# Patient Record
Sex: FEMALE | Race: BLACK OR AFRICAN AMERICAN | NOT HISPANIC OR LATINO | Employment: OTHER | URBAN - METROPOLITAN AREA
[De-identification: names, ages, dates, MRNs, and addresses within clinical notes are randomized per-mention and may not be internally consistent; named-entity substitution may affect disease eponyms.]

---

## 2018-09-06 ENCOUNTER — HOSPITAL ENCOUNTER (OUTPATIENT)
Dept: RADIOLOGY | Facility: HOSPITAL | Age: 78
Discharge: HOME | End: 2018-09-06
Attending: ORTHOPAEDIC SURGERY
Payer: MEDICARE

## 2018-09-06 ENCOUNTER — CLINICAL SUPPORT (OUTPATIENT)
Dept: LAB | Facility: HOSPITAL | Age: 78
End: 2018-09-06
Attending: ORTHOPAEDIC SURGERY
Payer: MEDICARE

## 2018-09-06 DIAGNOSIS — Z01.818 PREOP EXAMINATION: Primary | ICD-10-CM

## 2018-09-06 DIAGNOSIS — Z01.818 PREOP EXAMINATION: ICD-10-CM

## 2018-09-06 PROCEDURE — 93010 ELECTROCARDIOGRAM REPORT: CPT | Mod: ,,, | Performed by: INTERNAL MEDICINE

## 2018-09-06 PROCEDURE — 93005 ELECTROCARDIOGRAM TRACING: CPT

## 2018-09-06 PROCEDURE — 71046 X-RAY EXAM CHEST 2 VIEWS: CPT | Mod: 26,,, | Performed by: RADIOLOGY

## 2018-09-06 PROCEDURE — 93010 EKG 12-LEAD: ICD-10-PCS | Mod: ,,, | Performed by: INTERNAL MEDICINE

## 2018-09-06 PROCEDURE — 71046 X-RAY EXAM CHEST 2 VIEWS: CPT | Mod: TC,FY

## 2018-09-13 DIAGNOSIS — M17.12 PRIMARY OSTEOARTHRITIS OF LEFT KNEE: Primary | ICD-10-CM

## 2018-09-27 ENCOUNTER — HOSPITAL ENCOUNTER (OUTPATIENT)
Facility: HOSPITAL | Age: 78
Discharge: HOME | End: 2018-09-27
Attending: PAIN MEDICINE | Admitting: PAIN MEDICINE
Payer: MEDICARE

## 2018-09-27 ENCOUNTER — OFFICE VISIT (OUTPATIENT)
Dept: FAMILY MEDICINE | Facility: HOSPITAL | Age: 78
End: 2018-09-27
Payer: MEDICARE

## 2018-09-27 ENCOUNTER — ANESTHESIA EVENT (OUTPATIENT)
Dept: SURGERY | Facility: HOSPITAL | Age: 78
DRG: 470 | End: 2018-09-27
Payer: MEDICARE

## 2018-09-27 VITALS
TEMPERATURE: 99 F | HEIGHT: 62 IN | RESPIRATION RATE: 17 BRPM | SYSTOLIC BLOOD PRESSURE: 138 MMHG | HEART RATE: 88 BPM | OXYGEN SATURATION: 99 % | WEIGHT: 143 LBS | DIASTOLIC BLOOD PRESSURE: 74 MMHG | BODY MASS INDEX: 26.31 KG/M2

## 2018-09-27 VITALS
BODY MASS INDEX: 26.33 KG/M2 | HEIGHT: 62 IN | DIASTOLIC BLOOD PRESSURE: 80 MMHG | WEIGHT: 143.06 LBS | SYSTOLIC BLOOD PRESSURE: 158 MMHG | HEART RATE: 67 BPM

## 2018-09-27 DIAGNOSIS — Z01.818 PRE-OP EXAMINATION: ICD-10-CM

## 2018-09-27 DIAGNOSIS — M17.12 PRIMARY OSTEOARTHRITIS OF LEFT KNEE: Primary | ICD-10-CM

## 2018-09-27 DIAGNOSIS — M17.12 PRIMARY OSTEOARTHRITIS OF LEFT KNEE: ICD-10-CM

## 2018-09-27 DIAGNOSIS — I10 ESSENTIAL HYPERTENSION: ICD-10-CM

## 2018-09-27 DIAGNOSIS — Z01.818 PRE-OP TESTING: Primary | ICD-10-CM

## 2018-09-27 PROCEDURE — 99214 OFFICE O/P EST MOD 30 MIN: CPT | Mod: 25

## 2018-09-27 PROCEDURE — 25000003 PHARM REV CODE 250: Performed by: NURSE PRACTITIONER

## 2018-09-27 PROCEDURE — 64640 INJECTION TREATMENT OF NERVE: CPT | Performed by: NURSE PRACTITIONER

## 2018-09-27 PROCEDURE — 27201689 HC IOVERA SMART TIP/CARTRIDGE: Performed by: NURSE PRACTITIONER

## 2018-09-27 PROCEDURE — 64640 INJECTION TREATMENT OF NERVE: CPT | Mod: LT,,, | Performed by: NURSE PRACTITIONER

## 2018-09-27 RX ORDER — ACETAMINOPHEN 500 MG
1000 TABLET ORAL
Status: CANCELLED | OUTPATIENT
Start: 2018-09-27 | End: 2018-09-28

## 2018-09-27 RX ORDER — CELECOXIB 100 MG/1
400 CAPSULE ORAL
Status: CANCELLED | OUTPATIENT
Start: 2018-09-27 | End: 2018-09-27

## 2018-09-27 RX ORDER — ERGOCALCIFEROL 1.25 MG/1
50000 CAPSULE ORAL
COMMUNITY

## 2018-09-27 RX ORDER — TRANEXAMIC ACID 650 MG/1
1950 TABLET ORAL ONCE
Status: CANCELLED | OUTPATIENT
Start: 2018-09-27

## 2018-09-27 RX ORDER — DEXTROMETHORPHAN HYDROBROMIDE, GUAIFENESIN 5; 100 MG/5ML; MG/5ML
650 LIQUID ORAL EVERY 8 HOURS
COMMUNITY

## 2018-09-27 RX ORDER — CEFAZOLIN SODIUM 2 G/50ML
2 SOLUTION INTRAVENOUS
Status: CANCELLED | OUTPATIENT
Start: 2018-09-27

## 2018-09-27 RX ORDER — DOXYCYCLINE HYCLATE 100 MG
TABLET ORAL
COMMUNITY
Start: 2018-08-22 | End: 2018-09-27

## 2018-09-27 RX ORDER — PREGABALIN 75 MG/1
150 CAPSULE ORAL
Status: CANCELLED | OUTPATIENT
Start: 2018-09-27 | End: 2018-09-27

## 2018-09-27 RX ORDER — AMLODIPINE BESYLATE 10 MG/1
10 TABLET ORAL DAILY
COMMUNITY

## 2018-09-27 RX ORDER — ERYTHROMYCIN 5 MG/G
OINTMENT OPHTHALMIC
COMMUNITY
Start: 2018-08-22 | End: 2018-09-27

## 2018-09-27 RX ORDER — LIDOCAINE HYDROCHLORIDE 10 MG/ML
10 INJECTION INFILTRATION; PERINEURAL ONCE
Status: COMPLETED | OUTPATIENT
Start: 2018-09-27 | End: 2018-09-27

## 2018-09-27 RX ORDER — TIMOLOL MALEATE 5 MG/ML
1 SOLUTION/ DROPS OPHTHALMIC NIGHTLY
COMMUNITY

## 2018-09-27 RX ADMIN — LIDOCAINE HYDROCHLORIDE 10 ML: 10 INJECTION, SOLUTION INFILTRATION; PERINEURAL at 11:09

## 2018-09-27 NOTE — DISCHARGE SUMMARY
[facility]  Discharge Note  Short Stay    Admit Date: 9/27/2018    Discharge Date and Time: No discharge date for patient encounter.     Attending Physician: Jonathan Laureano Jr., MD     Discharge Provider: JESE Gorman  Interventional Pain Management    Diagnoses:  Active Hospital Problems    Diagnosis  POA    Primary osteoarthritis of left knee [M17.12]  Yes      Resolved Hospital Problems   No resolved problems to display.       Discharged Condition: good    Hospital Course: Patient was admitted for an outpatient procedure and tolerated the procedure well with no complications.    Final Diagnoses: Same as principal problem.    Disposition: Home or Self Care    Follow up/Patient Instructions:    Medications:  Reconciled Home Medications:      Medication List      ASK your doctor about these medications    acetaminophen 650 MG Tbsr  Commonly known as:  TYLENOL  Take 650 mg by mouth every 8 (eight) hours.     amLODIPine 10 MG tablet  Commonly known as:  NORVASC  Take 10 mg by mouth once daily.     aspirin 81 MG Chew  Take 81 mg by mouth once daily.     ATIVAN 1 MG tablet  Generic drug:  LORazepam  1 mg as needed.     atorvastatin 10 MG tablet  Commonly known as:  LIPITOR  10 mg once daily.     cloNIDine 0.2 mg/24 hr td ptwk 0.2 mg/24 hr  Commonly known as:  CATAPRES  Place onto the skin once a week.     COZAAR 100 MG tablet  Generic drug:  losartan  100 mg 2 (two) times daily.     omeprazole 20 MG capsule  Commonly known as:  PRILOSEC  20 mg once daily.     timolol maleate 0.5% 0.5 % Drop  Commonly known as:  TIMOPTIC  1 drop every evening.     VITAMIN D2 50,000 unit Cap  Generic drug:  ergocalciferol  Take 50,000 Units by mouth every 7 days.              Discharge Procedure Orders (must include Diet, Follow-up, Activity):  Discharge Diagnosis: Same as Pre and Post Procedure  Condition on Discharge: Stable.  Diet on Discharge: Same as before.  Activity: as per instruction sheet.  Discharge to: Home  with a responsible adult.  Follow up: as per Discharge instructions

## 2018-09-27 NOTE — ANESTHESIA PREPROCEDURE EVALUATION
09/27/2018  Aga Rodriges is a 78 y.o., female here for total knee arthroplasty    Past Medical History:   Diagnosis Date    Acid reflux     Hypertension      Past Surgical History:   Procedure Laterality Date    CARPAL TUNNEL RELEASE Left     HYSTERECTOMY       Review of patient's allergies indicates:  No Known Allergies      Anesthesia Evaluation    I have reviewed the Patient Summary Reports.     I have reviewed the Medications.     Review of Systems  Anesthesia Hx:  No problems with previous Anesthesia    Social:  Social Alcohol Use, Non-Smoker    Hematology/Oncology:     Oncology Normal    -- Anemia:   EENT/Dental:EENT/Dental Normal   Cardiovascular:   Exercise tolerance: good Hypertension Denies MI.   Denies CABG/stent.  hyperlipidemia ECG has been reviewed.    Pulmonary:  Pulmonary Normal    Renal/:  Renal/ Normal     Hepatic/GI:   GERD, well controlled    Musculoskeletal:   Arthritis     OB/GYN/PEDS:  S/p hysterectomy   Neurological:  Neurology Normal    Endocrine:  Endocrine Normal        Physical Exam  General:  Well nourished    Airway/Jaw/Neck:  Airway Findings: Mouth Opening: Normal Tongue: Normal  General Airway Assessment: Adult  Mallampati: III  Improves to II with phonation.  TM Distance: Normal, at least 6 cm  Jaw/Neck Findings:  Neck ROM: Extension Decreased, Mild     Eyes/Ears/Nose:  EYES/EARS/NOSE FINDINGS: Normal   Dental:  Dental Findings: upper partial dentures    Chest/Lungs:  Chest/Lungs Clear    Heart/Vascular:  Heart Findings: Normal Heart murmur: negative       Mental Status:  Mental Status Findings: Normal      Lab Results   Component Value Date    WBC 7.21 09/06/2018    HGB 11.5 (L) 09/06/2018    HCT 35.8 (L) 09/06/2018    MCV 84 09/06/2018     09/06/2018       Chemistry        Component Value Date/Time     09/06/2018 1305    K 4.2 09/06/2018 1305  "    09/06/2018 1305    CO2 28 09/06/2018 1305    BUN 31 (H) 09/06/2018 1305    CREATININE 1.2 09/06/2018 1305    GLU 94 09/06/2018 1305        Component Value Date/Time    CALCIUM 10.8 (H) 09/06/2018 1305    ALKPHOS 111 09/06/2018 1305    AST 24 09/06/2018 1305    ALT 17 09/06/2018 1305    BILITOT 0.3 09/06/2018 1305    ESTGFRAFRICA 51 (A) 09/06/2018 1305    EGFRNONAA 44 (A) 09/06/2018 1305        EKG 09/06/2018  Sinus bradycardia  Otherwise normal ECG  No previous ECGs available  Confirmed by ASHVIN WELLS, ARMIN ([id number]) on 9/6/2018 8:52:02 PM      Anesthesia Plan  Type of Anesthesia, risks & benefits discussed:  Anesthesia Type:  spinal, regional, MAC, general  Patient's Preference:   Intra-op Monitoring Plan: standard ASA monitors  Intra-op Monitoring Plan Comments:   Post Op Pain Control Plan: multimodal analgesia  Post Op Pain Control Plan Comments:   Induction:   IV  Beta Blocker:  Patient is not currently on a Beta-Blocker (No further documentation required).       Informed Consent: Patient understands risks and agrees with Anesthesia plan.  Questions answered. Anesthesia consent signed with patient.  ASA Score: 2     Day of Surgery Review of History & Physical: I have interviewed and examined the patient. I have reviewed the patient's H&P dated:  There are no significant changes.      Anesthesia Plan Notes:   To see family medicine tomorrow for "clearance"        Ready For Surgery From Anesthesia Perspective.       "

## 2018-09-27 NOTE — PLAN OF CARE
PATTI Bhatt NP at bedside to do Left knee Iovera injection. 1107- Time out done. 1110- Iovera procedure started Smart tip# [id number], Cartridge# [id number]. 1132-Iovera procedure completed; well tollerated. Discharge instructions given; verbalized understanding. 1140-Discharge to [facility] nurse ambulatory.

## 2018-09-27 NOTE — OP NOTE
Procedure Note Iovera:    DATE OF PROCEDURE:  9/27/2018    PREOPERATIVE DIAGNOSIS: left knee osteoarthritis.     POSTOPERATIVE DIAGNOSIS: left knee osteoarthritis.     PROCEDURE: Iovera treatment of anterior femoral cutaneous nerve, and both branches of infrapatellar saphenous nerve using at least 3 different punctures to treat all 3 nerves. (cpt 64640 x3)    ATTENDING SURGEON: JESE Gorman  Interventional Pain Management        ASSISTANT: none    COMPLICATIONS: None.     IMPLANTS:  None    ESTIMATED BLOOD LOSS:  < 5cc    SPECIMENS REMOVED:  None    ANESTHESIA: Local lidocaine    INDICATIONS FOR PROCEDURE: This is a 78 y.o. female with longstanding knee pain. They have failed non operative management including injections.I discussed a new treatment therapy called Iovera, which is cryotherapy, to provide symptomatic relief along the sensory distribution of the infrapatellar tendon branch of the saphenous nerve  and AFCN. The patient elected to move forward with this  We did discuss the fact that this is a fairly novel procedure and there is very limited scientific data around this.  However, it FDA approved.  The patient was given patient information and literature to review prior to the procedure as well.  Based on this, the patient agreed to move forward with doing the procedure.      PROCEDURE:    The patient was placed supine on the exam table and the proximal medial aspect of the left tibia and anterior aspect of distal femur was prepped with sterile Betadine and alcohol.  A line was drawn extending approximately 5 cm medial to inferior pole of the patella distally to a point approximately 5 cm medial to the tibial tubercle.  A second line was drawn in a medial to lateral direction the width of the patella approximately 7 cm proximal to the patella. We then infiltrated the skin with lidocaine along both lines using a 25g needle. We then introduced the Iovera device along these lines and this device  penetrated the skin, creating cryotherapy to both branches of the infrapatellar saphenous nerve and a third treatment to the anterior femoral cutaneous nerve. 7 punctures of the skin were made to treat the 2 branches of the ISN and another 7 punctures were made to treat the AFCN. There were a total of 3 nerves treated with iovera. The patient tolerated the procedure well with no problems.

## 2018-09-27 NOTE — H&P
SUBJECTIVE:     Procedure:Iovera Left Knee    Chief Complaint/Diagnosis: Primary Osteoarthris of Left Knee    PTA Medications   Medication Sig    ATIVAN 1 mg tablet 1 mg as needed.     atorvastatin (LIPITOR) 10 MG tablet 10 mg once daily.     cloNIDine 0.2 mg/24 hr td ptwk (CATAPRES) 0.2 mg/24 hr Place onto the skin once a week.     COZAAR 100 mg tablet 100 mg 2 (two) times daily.     estradiol (ESTRACE) 1 MG tablet TK 1 T PO QD    nifedipine (PROCARDIA-XL) 60 MG (OSM) 24 hr tablet 60 mg once daily.     omeprazole (PRILOSEC) 20 MG capsule 20 mg once daily.     aspirin 81 MG Chew Take 81 mg by mouth once daily.       Review of patient's allergies indicates:  No Known Allergies    Past Medical History:   Diagnosis Date    Acid reflux     Hypertension      Past Surgical History:   Procedure Laterality Date    CARPAL TUNNEL RELEASE Left     HYSTERECTOMY       Family History   Problem Relation Age of Onset    Other Mother         enlarged heart    Cancer Sister     Emphysema Brother      Social History     Tobacco Use    Smoking status: Never Smoker    Smokeless tobacco: Never Used   Substance Use Topics    Alcohol use: Yes     Comment: socially    Drug use: No        Review of Systems:  Constitutional: no fever or chills  Eyes: no visual changes  ENT: no nasal congestion or sore throat  Respiratory: no cough or shortness of breath  Cardiovascular: no chest pain or palpitations  Musculoskeletal: no arthralgias or myalgias  Behavioral/Psych: no auditory or visual hallucinations    OBJECTIVE:     Vital Signs (Most Recent)  Temp: 98.5 °F (36.9 °C) (09/27/18 0952)  Pulse: 88 (09/27/18 0952)  Resp: 17 (09/27/18 0952)  BP: 138/74 (09/27/18 0952)  SpO2: 99 % (09/27/18 0952)    Physical Exam:  General: no distress  Eyes:  conjunctivae/corneas clear.  Throat: lips, mucosa, and tongue normal; teeth and gums normal and no throat erythema  Neck: supple, symmetrical, trachea midline  Lungs:  clear to  auscultation bilaterally and normal respiratory effort  Skin: Skin color, texture, turgor normal. No rashes or lesions  Neurologic: Alert and oriented. Thought content appropriate

## 2018-09-27 NOTE — DISCHARGE INSTRUCTIONS
YOU NEED TO GET A TYPE AND SCREEN BLOODWORK AFTER IOVERA INJECTION, THEY WILL GIVE YOU AN ARMBAND THAT NEEDS TO RETURN WITH YOU ON DAY OF SURGERY    Iovera After Care at Home     Keep the treatment area clean and dry during healing.     For soreness relief, you may use ice or over-the-counter pain medication (at your  doctors discretion).     Return slowly to normal activities per your doctors discretion. Seek guidance around  safe levels of exertion or stress.     Unless specifically suggested by your doctor, avoid blood-thinning drugs, including  ibuprofen, aspirin or any blood thinners for 48 hours. Acetaminophen, however, may  be an option...ask your doctor!    Long-Term Recommendations:   Utilize opportunities for joint rehabilitation and strengthening.     Exercise appropriately.  Share your experience!  [email or web address]  For more information, please visit [email or web address]            · Arrive on 10/1/2018  at  08:30 a.m. .  · Report to the 2nd floor Procedural Check In Room .   · Nothing to eat or drink after midnight the night before your procedure.  · Take the AMLODIPINE, COZAAR, OMEPRAZOLE medications the morning of surgery with a small sip of water.                                                         · Please remove all body piercings and leave all your jewelery and valuables at home .  · Please remove your contact lenses.   · Wear loose comfortable clothing.  · You will not be able to drive that day, please make arrangement for transportation to and from your procedure.  · You cannot be alone for 24 hours after anesthesia. Make arrangements as needed.  · Shower the night before your procedure and the morning of your procedure with Hibiclens antibacterial solution.  · No lotions, creams or powders  · Do not shave 3 days prior to procedure  · Report any signs or symptoms of an infection to your surgeon. Examples: urinary (bladder) infection, upper respiratory infection, skin boils.    · Practice good hand washing before, during, and after procedure.   · Stop Aspirin, Ibuprofen, Advil, Motrin, Aleve, Nuprin, Naprosyn (all NSAID Medication) or any other blood thinners 5 days before your procedure unless directed by your physician.  Also hold all over the counter vitamins and herbal supplements for 5 days prior to your procedure.  · You may take Tylenol or Acetaminophen up the day of surgery for any pain.        I have read or had read and explained to me, and understand the above information.    Additional comments or instructions:  For additional questions call [phone]        Pre-Op Bathing Instructions    Before surgery, you can play an important role in your own health.    Because skin is not sterile, we need to be sure that your skin is as free of germs as possible. By following the instructions below, you can reduce the number of germs on your skin before surgery.    IMPORTANT: You will need to shower with a special soap called Hibiclens*, available at any pharmacy, over the counter usually in the first aid isle.  If you are allergic to Chlorhexidine (the antiseptic in Hibiclens), use an antibacterial soap such as Dial Soap for your preoperative showers.  You will shower with Hibiclens the night before and the morning of surgery. Both the night before your surgery and the morning of your surgery see steps 2 and 3.   Do not use Hibiclens on the head, face or genitals to avoid injury to those areas.    STEP #1  1.  Shower with Hibiclens solution night before and the morning of surgery.      STEP #2: THE NIGHT BEFORE YOUR SURGERY     1. Do not shave the area of your body where your surgery will be performed.  2. Wash your hair as usual with your normal  Shampoo. Wash body shoulder to toes with your normal soap.  3. Squeeze Hibiclens into hand apply to surgical site.   4. Wash the site gently for five (5) minutes. Do not scrub your skin too hard.   5. Do not wash with your regular soap after  Hibiclens is used.  6. Rinse your body thoroughly.  7. Pat yourself dry with a clean, soft towel.  8. Do not use lotion, cream, or powder.  9. Wear clean clothes.    STEP #3: THE MORNING OF YOUR SURGERY     1. Repeat Step #2.    * Not to be used by people allergic to Chlorhexidine.            Anesthesia: Regional Anesthesia    Youre scheduled for surgery. During surgery, youll receive medicine called anesthesia to keep you comfortable and pain-free. Your surgeon has decided that youll receive regional anesthesia. This sheet tells you what to expect with this type of anesthesia.  What is regional anesthesia?  Regional anesthesia numbs one region of your body. The anesthesia may be given around nerves or into veins in your arms, neck, or legs (nerve block or Winfred block). Or it may be sent into the spinal fluid (spinal anesthesia) or into the space just outside the spinal fluid (epidural anesthesia). You may also be given sedatives to help you relax.  Nerve block or Kaleb block  A small area of the body, such as an arm or leg, can be numbed using a nerve block or Kaleb block.  · Nerve block. During a nerve block, your skin is numbed. A needle is then inserted near nerves that serve the area to be numbed. Anesthetic is sent through the needle.  · IV regional or Winfred block. For this type of block, an IV line is put into a vein. The blood flow to the area to be numbed is blocked for a short time. Anesthetic is sent through the IV.  Spinal anesthesia  Spinal anesthesia numbs your body from about the waist down.  · Anesthetic is injected into the spinal fluid. This is a substance that surrounds the spinal cord in your spinal column. The anesthetic blocks pain traveling from the body to the brain.  · To receive the anesthetic, your skin is numbed at the injection site on your back.  · A needle is then inserted into the spinal space. Anesthetic is sent into the spinal fluid through the needle.  Epidural anesthesia  Epidural  anesthesia is most commonly used during childbirth and may also be used after surgical procedures of the chest, belly, and legs.  · Anesthetic is injected into the epidural space. This is just outside the dural sac which contains the spinal fluid.  · To receive the anesthetic, your skin is numbed at the injection site on your back.  · A needle is then inserted into the epidural space. Anesthetic is sent into the epidural space through the needle.  · A small flexible catheter may be attached to the needle and left in place. This allows for continuous injections or infusions of anesthetic.  Anesthesia tools and medicines that might be near you during your procedure  · Local anesthetic. This medicine is given through a needle numbs one region of your body.  · Electrocardiography leads (electrodes). These are used to record your heart rate and rhythm.  · Blood pressure cuff. A cuff is placed on your arm to keep track of your blood pressure.  · Pulse oximeter. This small clip is placed on the end of the finger. It measures your blood oxygen level.  · Sedatives. These medicines may be given through an IV. They help to relax you and keep you comfortable. You may stay awake or sleep lightly.  · Oxygen. You may be given oxygen through a facemask.  Risks and possible complications  Regional anesthesia carries some risks. These include:  · Nausea and vomiting  · Headache  · Backache  · Decreased blood pressure  · Allergic reaction to the anesthetic  · Ongoing numbness (rare)  · Irregular heartbeat (rare)  · Cardiac arrest (rare)   Date Last Reviewed: 12/1/2016  © 3828-4802 [employer]. [address] [place] [address]. All rights reserved. This information is not intended as a substitute for professional medical care. Always follow your healthcare professional's instructions.            Anesthesia: Monitored Anesthesia Care (MAC)    Youre due to have surgery. During surgery, youll be given medicine  called anesthesia. This will keep you comfortable and pain-free. Your surgeon will use monitored anesthesia care (MAC). This sheet tells you more about this type of anesthesia.  What is monitored anesthesia care?  MAC keeps you very drowsy during surgery. You may be awake, but you will likely not remember much. And you wont feel pain. With MAC, medicines are given through an IV line into a vein in your arm or hand. A local anesthetic will usually be injected into the skin and muscle around the surgical site to numb it. The anesthesia provider monitors you during the procedure. He or she checks your heart rate and rhythm, blood pressure, and blood oxygen level.  Anesthesia tools and medicines that may be near you during your procedure  You will likely have:  · A pulse oximeter on the end of your finger. This measures your blood oxygen level.  · Electrocardiography leads (electrodes) on your chest. These record your heart rate and rhythm.  · Medicines given through an IV. These relax you and prevent pain. You may be awake or sleep lightly. If you have local anesthetic, it is injected directly into your skin.  · A facemask to give you oxygen, if needed.  Risks and possible complications  MAC has some risks. These include:  · Breathing problems  · Nausea and vomiting  · Allergic reaction to the anesthetic    Anesthesia safety  Tips for anesthesia safety include the following:   · Follow all instructions you are given for how long not to eat or drink before your procedure.  · Be sure your healthcare provider knows what medicines you take, especially any anti-inflammatory medicine or blood thinners. This includes aspirin and any other over-the-counter medicines, herbs, and supplements.  · Have an adult family member or friend drive you home after the procedure.  · For the first 24 hours after your surgery:  ¨ Do not drive or use heavy equipment.  ¨ Do not make important decisions or sign documents.  ¨ Avoid  alcohol.  ¨ Have someone stay with you, if possible. They can watch for problems and help keep you safe.  Date Last Reviewed: 12/1/2016  © 4030-0744 [employer]. [address] [place] [address], [place] [address]. All rights reserved. This information is not intended as a substitute for professional medical care. Always follow your healthcare professional's instructions.

## 2018-09-27 NOTE — H&P (VIEW-ONLY)
Subjective:       Patient ID: Aga Rodriges is a 78 y.o. female.    Chief Complaint: L knee pain     HPI Mrs. Rodriges 77 Yo F with a PMH of HTN, OA who presents for pre-op risk stratification for L knee arthroplasty. Pt reports being very active including bowling, cooking, gardening with no SOB or chest pain. Her activities are only limited by her OA, Pt denies any SOB, PND, MARTINEZ, syncope, past hx of DVT/PE, bad rxn to anesthesia.    DASI score 6.3    PMH: HTN today hypertensive but reports systolics in 110s at home. OA    PSx: Hysterectomy,     Fam Hx: mother: cardiomegaly    Soc: Pt denies tobacco use, drinks 1 drink/wk, denies any other drug use.      Review of Systems   Constitutional: Negative for activity change, appetite change, chills, diaphoresis, fatigue, fever and unexpected weight change.   HENT: Negative for congestion, nosebleeds, postnasal drip, rhinorrhea, sinus pressure, sinus pain, sneezing and sore throat.    Eyes: Negative for photophobia, pain, discharge, itching and visual disturbance.   Respiratory: Negative for apnea, chest tightness, shortness of breath, wheezing and stridor.    Cardiovascular: Negative for chest pain, palpitations and leg swelling.   Gastrointestinal: Negative for abdominal distention, abdominal pain, anal bleeding, blood in stool, constipation, diarrhea and vomiting.   Endocrine: Negative for cold intolerance and heat intolerance.   Genitourinary: Negative for difficulty urinating, dysuria, flank pain, hematuria, pelvic pain and urgency.   Musculoskeletal: Positive for arthralgias. Negative for back pain, myalgias, neck pain and neck stiffness.        L knee OA as mentioned in HPI   Skin: Negative for color change, pallor, rash and wound.   Neurological: Negative for dizziness, tremors, seizures, syncope, facial asymmetry, speech difficulty, weakness, light-headedness, numbness and headaches.   Hematological: Negative for adenopathy. Does not bruise/bleed easily.        Objective:      Vitals:    09/27/18 1320   BP: (!) 158/80   Pulse: 67     Physical Exam   Constitutional: She is oriented to person, place, and time. She appears well-developed and well-nourished. No distress.   HENT:   Head: Normocephalic and atraumatic.   Right Ear: External ear normal.   Left Ear: External ear normal.   Eyes: Conjunctivae and EOM are normal. Pupils are equal, round, and reactive to light.   Neck: Normal range of motion. Neck supple.   Cardiovascular: Normal rate, regular rhythm and intact distal pulses. Exam reveals no gallop and no friction rub.   Murmur heard.  Systolic murmur c/w with Aortic stenosis but pt denies, fatigue, syncope, lightheadedness, dizziness.    Pulmonary/Chest: Effort normal and breath sounds normal. No respiratory distress. She has no wheezes. She exhibits no tenderness.   Abdominal: Soft. Bowel sounds are normal. She exhibits no distension. There is no tenderness.   Musculoskeletal: She exhibits no edema or deformity.   Lymphadenopathy:     She has no cervical adenopathy.   Neurological: She is alert and oriented to person, place, and time. No cranial nerve deficit. Coordination normal.   Skin: Skin is warm and dry. Capillary refill takes less than 2 seconds. No rash noted. No erythema. No pallor.   Nursing note and vitals reviewed.      Assessment:       1. Pre-op examination    2. Primary osteoarthritis of left knee    3. Essential hypertension        Plan:       Primary osteoarthritis of left knee  -bp elevated today but recheck was 142/90 could be white coat syndrome  -reports she checks her bp at home and it's in the 110's  -DASI score 6.3  -Intermediate risk surgery  -labs reviewed     HTN  -continue cozaar and BP log book      f/u 6 months or prn

## 2018-10-01 ENCOUNTER — ANESTHESIA (OUTPATIENT)
Dept: SURGERY | Facility: HOSPITAL | Age: 78
DRG: 470 | End: 2018-10-01
Payer: MEDICARE

## 2018-10-01 ENCOUNTER — HOSPITAL ENCOUNTER (INPATIENT)
Facility: HOSPITAL | Age: 78
LOS: 1 days | Discharge: HOME | DRG: 470 | End: 2018-10-01
Attending: ORTHOPAEDIC SURGERY | Admitting: ORTHOPAEDIC SURGERY
Payer: MEDICARE

## 2018-10-01 VITALS
WEIGHT: 143 LBS | HEIGHT: 62 IN | DIASTOLIC BLOOD PRESSURE: 77 MMHG | OXYGEN SATURATION: 97 % | HEART RATE: 55 BPM | TEMPERATURE: 98 F | BODY MASS INDEX: 26.31 KG/M2 | SYSTOLIC BLOOD PRESSURE: 133 MMHG | RESPIRATION RATE: 18 BRPM

## 2018-10-01 DIAGNOSIS — M17.12 OSTEOARTHRITIS OF LEFT KNEE, UNSPECIFIED OSTEOARTHRITIS TYPE: ICD-10-CM

## 2018-10-01 DIAGNOSIS — M17.12 PRIMARY OSTEOARTHRITIS OF LEFT KNEE: Primary | ICD-10-CM

## 2018-10-01 LAB
APPEARANCE FLD: CLEAR
BASOPHILS NFR FLD MANUAL: 0 %
BODY FLD TYPE: NORMAL
BODY FLUID COMMENTS: NORMAL
COLOR FLD: NORMAL
EOSINOPHIL NFR FLD MANUAL: 1 %
LYMPHOCYTES NFR FLD MANUAL: 46 %
MESOTHL CELL NFR FLD MANUAL: 0 %
MONOS+MACROS NFR FLD MANUAL: 13 %
NEUTROPHILS NFR FLD MANUAL: 40 %
OTHER CELLS FLD MANUAL: 0 %
WBC # FLD: 39 /CU MM

## 2018-10-01 PROCEDURE — 64447 NJX AA&/STRD FEMORAL NRV IMG: CPT | Performed by: ANESTHESIOLOGY

## 2018-10-01 PROCEDURE — 37000008 HC ANESTHESIA 1ST 15 MINUTES: Performed by: ORTHOPAEDIC SURGERY

## 2018-10-01 PROCEDURE — G8979 MOBILITY GOAL STATUS: HCPCS | Mod: CK

## 2018-10-01 PROCEDURE — G8987 SELF CARE CURRENT STATUS: HCPCS | Mod: CK

## 2018-10-01 PROCEDURE — 25000003 PHARM REV CODE 250: Performed by: NURSE ANESTHETIST, CERTIFIED REGISTERED

## 2018-10-01 PROCEDURE — 97161 PT EVAL LOW COMPLEX 20 MIN: CPT

## 2018-10-01 PROCEDURE — 12000002 HC ACUTE/MED SURGE SEMI-PRIVATE ROOM

## 2018-10-01 PROCEDURE — 97165 OT EVAL LOW COMPLEX 30 MIN: CPT

## 2018-10-01 PROCEDURE — G8980 MOBILITY D/C STATUS: HCPCS | Mod: CK

## 2018-10-01 PROCEDURE — G8978 MOBILITY CURRENT STATUS: HCPCS | Mod: CK

## 2018-10-01 PROCEDURE — 25000003 PHARM REV CODE 250: Performed by: STUDENT IN AN ORGANIZED HEALTH CARE EDUCATION/TRAINING PROGRAM

## 2018-10-01 PROCEDURE — 71000033 HC RECOVERY, INTIAL HOUR: Performed by: ORTHOPAEDIC SURGERY

## 2018-10-01 PROCEDURE — 63600175 PHARM REV CODE 636 W HCPCS: Performed by: ANESTHESIOLOGY

## 2018-10-01 PROCEDURE — 89051 BODY FLUID CELL COUNT: CPT

## 2018-10-01 PROCEDURE — 97535 SELF CARE MNGMENT TRAINING: CPT

## 2018-10-01 PROCEDURE — 27200665 HC NERVE BLOCK NEEDLE/ CATHETER: Performed by: ANESTHESIOLOGY

## 2018-10-01 PROCEDURE — 63600175 PHARM REV CODE 636 W HCPCS: Performed by: NURSE ANESTHETIST, CERTIFIED REGISTERED

## 2018-10-01 PROCEDURE — G8988 SELF CARE GOAL STATUS: HCPCS | Mod: CK

## 2018-10-01 PROCEDURE — C1713 ANCHOR/SCREW BN/BN,TIS/BN: HCPCS | Performed by: ORTHOPAEDIC SURGERY

## 2018-10-01 PROCEDURE — 36000710: Performed by: ORTHOPAEDIC SURGERY

## 2018-10-01 PROCEDURE — 71000016 HC POSTOP RECOV ADDL HR: Performed by: ORTHOPAEDIC SURGERY

## 2018-10-01 PROCEDURE — C1776 JOINT DEVICE (IMPLANTABLE): HCPCS | Performed by: ORTHOPAEDIC SURGERY

## 2018-10-01 PROCEDURE — 25000003 PHARM REV CODE 250: Performed by: ORTHOPAEDIC SURGERY

## 2018-10-01 PROCEDURE — G8989 SELF CARE D/C STATUS: HCPCS | Mod: CK

## 2018-10-01 PROCEDURE — 36000711: Performed by: ORTHOPAEDIC SURGERY

## 2018-10-01 PROCEDURE — 63600175 PHARM REV CODE 636 W HCPCS: Performed by: STUDENT IN AN ORGANIZED HEALTH CARE EDUCATION/TRAINING PROGRAM

## 2018-10-01 PROCEDURE — 8E0YXBZ COMPUTER ASSISTED PROCEDURE OF LOWER EXTREMITY: ICD-10-PCS | Performed by: ORTHOPAEDIC SURGERY

## 2018-10-01 PROCEDURE — 0SRD0J9 REPLACEMENT OF LEFT KNEE JOINT WITH SYNTHETIC SUBSTITUTE, CEMENTED, OPEN APPROACH: ICD-10-PCS | Performed by: ORTHOPAEDIC SURGERY

## 2018-10-01 PROCEDURE — 71000039 HC RECOVERY, EACH ADD'L HOUR: Performed by: ORTHOPAEDIC SURGERY

## 2018-10-01 PROCEDURE — 71000015 HC POSTOP RECOV 1ST HR: Performed by: ORTHOPAEDIC SURGERY

## 2018-10-01 PROCEDURE — 27201423 OPTIME MED/SURG SUP & DEVICES STERILE SUPPLY: Performed by: ORTHOPAEDIC SURGERY

## 2018-10-01 PROCEDURE — S0020 INJECTION, BUPIVICAINE HYDRO: HCPCS | Performed by: ORTHOPAEDIC SURGERY

## 2018-10-01 PROCEDURE — 37000009 HC ANESTHESIA EA ADD 15 MINS: Performed by: ORTHOPAEDIC SURGERY

## 2018-10-01 DEVICE — CEMENT BONE LV G PALACOS 1X40: Type: IMPLANTABLE DEVICE | Site: KNEE | Status: FUNCTIONAL

## 2018-10-01 DEVICE — INSERT TIBIAL PS #3 XPE 9MM: Type: IMPLANTABLE DEVICE | Site: KNEE | Status: FUNCTIONAL

## 2018-10-01 DEVICE — COMPONENT FEMORAL PS #4 LT: Type: IMPLANTABLE DEVICE | Site: KNEE | Status: FUNCTIONAL

## 2018-10-01 DEVICE — PATELLA XPE LARGE 35MM: Type: IMPLANTABLE DEVICE | Site: KNEE | Status: FUNCTIONAL

## 2018-10-01 DEVICE — TIBIAL BASEPLATE CEMENTED #3: Type: IMPLANTABLE DEVICE | Site: KNEE | Status: FUNCTIONAL

## 2018-10-01 RX ORDER — OXYCODONE AND ACETAMINOPHEN 5; 325 MG/1; MG/1
1 TABLET ORAL EVERY 6 HOURS PRN
Qty: 28 TABLET | Refills: 0 | OUTPATIENT
Start: 2018-10-01 | End: 2018-10-08

## 2018-10-01 RX ORDER — SODIUM CHLORIDE 0.9 % (FLUSH) 0.9 %
3 SYRINGE (ML) INJECTION
Status: DISCONTINUED | OUTPATIENT
Start: 2018-10-01 | End: 2018-10-01 | Stop reason: HOSPADM

## 2018-10-01 RX ORDER — BUPIVACAINE HYDROCHLORIDE 2.5 MG/ML
INJECTION, SOLUTION INFILTRATION; PERINEURAL
Status: DISCONTINUED | OUTPATIENT
Start: 2018-10-01 | End: 2018-10-01 | Stop reason: HOSPADM

## 2018-10-01 RX ORDER — TRANEXAMIC ACID 650 MG/1
1950 TABLET ORAL ONCE
Status: DISCONTINUED | OUTPATIENT
Start: 2018-10-01 | End: 2018-10-01 | Stop reason: SDUPTHER

## 2018-10-01 RX ORDER — EPHEDRINE SULFATE 50 MG/ML
INJECTION, SOLUTION INTRAVENOUS
Status: DISCONTINUED | OUTPATIENT
Start: 2018-10-01 | End: 2018-10-01

## 2018-10-01 RX ORDER — CEPHALEXIN 500 MG/1
500 CAPSULE ORAL EVERY 6 HOURS
Qty: 4 CAPSULE | Refills: 0 | OUTPATIENT
Start: 2018-10-01 | End: 2018-10-02

## 2018-10-01 RX ORDER — PREGABALIN 75 MG/1
75 CAPSULE ORAL 2 TIMES DAILY
Status: DISCONTINUED | OUTPATIENT
Start: 2018-10-01 | End: 2018-10-01 | Stop reason: HOSPADM

## 2018-10-01 RX ORDER — MIDAZOLAM HYDROCHLORIDE 1 MG/ML
INJECTION, SOLUTION INTRAMUSCULAR; INTRAVENOUS
Status: DISCONTINUED | OUTPATIENT
Start: 2018-10-01 | End: 2018-10-01

## 2018-10-01 RX ORDER — ONDANSETRON 2 MG/ML
4 INJECTION INTRAMUSCULAR; INTRAVENOUS EVERY 12 HOURS PRN
Status: DISCONTINUED | OUTPATIENT
Start: 2018-10-01 | End: 2018-10-01 | Stop reason: HOSPADM

## 2018-10-01 RX ORDER — FAMOTIDINE 20 MG/1
20 TABLET, FILM COATED ORAL 2 TIMES DAILY
Status: DISCONTINUED | OUTPATIENT
Start: 2018-10-01 | End: 2018-10-01 | Stop reason: HOSPADM

## 2018-10-01 RX ORDER — ACETAMINOPHEN 325 MG/1
650 TABLET ORAL
Status: DISCONTINUED | OUTPATIENT
Start: 2018-10-01 | End: 2018-10-01 | Stop reason: HOSPADM

## 2018-10-01 RX ORDER — PROPOFOL 10 MG/ML
VIAL (ML) INTRAVENOUS CONTINUOUS PRN
Status: DISCONTINUED | OUTPATIENT
Start: 2018-10-01 | End: 2018-10-01

## 2018-10-01 RX ORDER — CEFAZOLIN SODIUM 2 G/50ML
2 SOLUTION INTRAVENOUS
Status: DISCONTINUED | OUTPATIENT
Start: 2018-10-01 | End: 2018-10-01 | Stop reason: HOSPADM

## 2018-10-01 RX ORDER — DIPHENHYDRAMINE HYDROCHLORIDE 50 MG/ML
12.5 INJECTION INTRAMUSCULAR; INTRAVENOUS EVERY 6 HOURS PRN
Status: DISCONTINUED | OUTPATIENT
Start: 2018-10-01 | End: 2018-10-01 | Stop reason: HOSPADM

## 2018-10-01 RX ORDER — HYDROCODONE BITARTRATE AND ACETAMINOPHEN 10; 325 MG/1; MG/1
1 TABLET ORAL EVERY 4 HOURS PRN
Status: DISCONTINUED | OUTPATIENT
Start: 2018-10-01 | End: 2018-10-01 | Stop reason: HOSPADM

## 2018-10-01 RX ORDER — PROMETHAZINE HYDROCHLORIDE 12.5 MG/1
12.5 TABLET ORAL EVERY 6 HOURS PRN
Status: DISCONTINUED | OUTPATIENT
Start: 2018-10-01 | End: 2018-10-01 | Stop reason: HOSPADM

## 2018-10-01 RX ORDER — LIDOCAINE HCL/PF 100 MG/5ML
SYRINGE (ML) INTRAVENOUS
Status: DISCONTINUED | OUTPATIENT
Start: 2018-10-01 | End: 2018-10-01

## 2018-10-01 RX ORDER — TRANEXAMIC ACID 650 MG/1
1950 TABLET ORAL ONCE
Status: COMPLETED | OUTPATIENT
Start: 2018-10-01 | End: 2018-10-01

## 2018-10-01 RX ORDER — AMOXICILLIN 250 MG
1 CAPSULE ORAL 2 TIMES DAILY
Status: DISCONTINUED | OUTPATIENT
Start: 2018-10-01 | End: 2018-10-01 | Stop reason: HOSPADM

## 2018-10-01 RX ORDER — BISACODYL 10 MG
10 SUPPOSITORY, RECTAL RECTAL 2 TIMES DAILY PRN
Status: DISCONTINUED | OUTPATIENT
Start: 2018-10-01 | End: 2018-10-01 | Stop reason: HOSPADM

## 2018-10-01 RX ORDER — HYDROMORPHONE HYDROCHLORIDE 2 MG/ML
0.5 INJECTION, SOLUTION INTRAMUSCULAR; INTRAVENOUS; SUBCUTANEOUS EVERY 5 MIN PRN
Status: DISCONTINUED | OUTPATIENT
Start: 2018-10-01 | End: 2018-10-01 | Stop reason: HOSPADM

## 2018-10-01 RX ORDER — CEPHALEXIN 500 MG/1
500 CAPSULE ORAL 4 TIMES DAILY
Qty: 16 CAPSULE | Refills: 0 | Status: SHIPPED | OUTPATIENT
Start: 2018-10-01 | End: 2018-10-05

## 2018-10-01 RX ORDER — ONDANSETRON 2 MG/ML
4 INJECTION INTRAMUSCULAR; INTRAVENOUS DAILY PRN
Status: DISCONTINUED | OUTPATIENT
Start: 2018-10-01 | End: 2018-10-01 | Stop reason: HOSPADM

## 2018-10-01 RX ORDER — DEXAMETHASONE SODIUM PHOSPHATE 100 MG/10ML
10 INJECTION INTRAMUSCULAR; INTRAVENOUS ONCE
Status: COMPLETED | OUTPATIENT
Start: 2018-10-01 | End: 2018-10-01

## 2018-10-01 RX ORDER — OXYCODONE AND ACETAMINOPHEN 5; 325 MG/1; MG/1
1 TABLET ORAL EVERY 6 HOURS PRN
Qty: 28 TABLET | Refills: 0 | Status: SHIPPED | OUTPATIENT
Start: 2018-10-01 | End: 2018-10-08

## 2018-10-01 RX ORDER — ACETAMINOPHEN 500 MG
1000 TABLET ORAL
Status: COMPLETED | OUTPATIENT
Start: 2018-10-01 | End: 2018-10-01

## 2018-10-01 RX ORDER — FAMOTIDINE 20 MG/1
20 TABLET, FILM COATED ORAL 2 TIMES DAILY
Qty: 84 TABLET | Refills: 0 | Status: SHIPPED | OUTPATIENT
Start: 2018-10-01 | End: 2023-02-08 | Stop reason: ALTCHOICE

## 2018-10-01 RX ORDER — ASPIRIN 81 MG/1
81 TABLET ORAL 2 TIMES DAILY
Qty: 84 TABLET | Refills: 0 | Status: SHIPPED | OUTPATIENT
Start: 2018-10-01 | End: 2023-02-08

## 2018-10-01 RX ORDER — SODIUM CHLORIDE 9 MG/ML
INJECTION, SOLUTION INTRAVENOUS CONTINUOUS PRN
Status: DISCONTINUED | OUTPATIENT
Start: 2018-10-01 | End: 2018-10-01

## 2018-10-01 RX ORDER — PROPOFOL 10 MG/ML
VIAL (ML) INTRAVENOUS
Status: DISCONTINUED | OUTPATIENT
Start: 2018-10-01 | End: 2018-10-01

## 2018-10-01 RX ORDER — ROPIVACAINE HYDROCHLORIDE 5 MG/ML
INJECTION, SOLUTION EPIDURAL; INFILTRATION; PERINEURAL
Status: DISCONTINUED | OUTPATIENT
Start: 2018-10-01 | End: 2018-10-01

## 2018-10-01 RX ORDER — ASPIRIN 81 MG/1
81 TABLET ORAL DAILY
Qty: 84 TABLET | Refills: 0 | OUTPATIENT
Start: 2018-10-01 | End: 2018-11-12

## 2018-10-01 RX ORDER — CELECOXIB 100 MG/1
200 CAPSULE ORAL 2 TIMES DAILY
Status: DISCONTINUED | OUTPATIENT
Start: 2018-10-01 | End: 2018-10-01 | Stop reason: HOSPADM

## 2018-10-01 RX ORDER — POVIDONE-IODINE 10 %
SOLUTION, NON-ORAL TOPICAL
Status: DISCONTINUED | OUTPATIENT
Start: 2018-10-01 | End: 2018-10-01 | Stop reason: HOSPADM

## 2018-10-01 RX ORDER — FAMOTIDINE 20 MG/1
20 TABLET, FILM COATED ORAL 2 TIMES DAILY
Qty: 84 TABLET | Refills: 1 | OUTPATIENT
Start: 2018-10-01 | End: 2018-11-12

## 2018-10-01 RX ORDER — TRANEXAMIC ACID 100 MG/ML
1000 INJECTION, SOLUTION INTRAVENOUS
Status: DISCONTINUED | OUTPATIENT
Start: 2018-10-01 | End: 2018-10-01 | Stop reason: HOSPADM

## 2018-10-01 RX ORDER — TRANEXAMIC ACID 100 MG/ML
1000 INJECTION, SOLUTION INTRAVENOUS ONCE
Status: COMPLETED | OUTPATIENT
Start: 2018-10-01 | End: 2018-10-01

## 2018-10-01 RX ORDER — CELECOXIB 100 MG/1
400 CAPSULE ORAL
Status: COMPLETED | OUTPATIENT
Start: 2018-10-01 | End: 2018-10-01

## 2018-10-01 RX ORDER — PREGABALIN 75 MG/1
150 CAPSULE ORAL
Status: COMPLETED | OUTPATIENT
Start: 2018-10-01 | End: 2018-10-01

## 2018-10-01 RX ORDER — ONDANSETRON 2 MG/ML
INJECTION INTRAMUSCULAR; INTRAVENOUS
Status: DISCONTINUED | OUTPATIENT
Start: 2018-10-01 | End: 2018-10-01

## 2018-10-01 RX ADMIN — ONDANSETRON 4 MG: 2 INJECTION, SOLUTION INTRAMUSCULAR; INTRAVENOUS at 02:10

## 2018-10-01 RX ADMIN — PROPOFOL 20 MG: 10 INJECTION, EMULSION INTRAVENOUS at 02:10

## 2018-10-01 RX ADMIN — EPHEDRINE SULFATE 5 MG: 50 INJECTION, SOLUTION INTRAMUSCULAR; INTRAVENOUS; SUBCUTANEOUS at 01:10

## 2018-10-01 RX ADMIN — TRANEXAMIC ACID 1000 MG: 100 INJECTION, SOLUTION INTRAVENOUS at 01:10

## 2018-10-01 RX ADMIN — CEFAZOLIN SODIUM 2 G: 2 SOLUTION INTRAVENOUS at 12:10

## 2018-10-01 RX ADMIN — DEXAMETHASONE SODIUM PHOSPHATE 10 MG: 10 INJECTION INTRAMUSCULAR; INTRAVENOUS at 12:10

## 2018-10-01 RX ADMIN — TRANEXAMIC ACID 1950 MG: 650 TABLET, FILM COATED ORAL at 08:10

## 2018-10-01 RX ADMIN — PROPOFOL 20 MG: 10 INJECTION, EMULSION INTRAVENOUS at 12:10

## 2018-10-01 RX ADMIN — EPHEDRINE SULFATE 5 MG: 50 INJECTION, SOLUTION INTRAMUSCULAR; INTRAVENOUS; SUBCUTANEOUS at 02:10

## 2018-10-01 RX ADMIN — LIDOCAINE HYDROCHLORIDE 50 MG: 20 INJECTION, SOLUTION INTRAVENOUS at 12:10

## 2018-10-01 RX ADMIN — PROPOFOL 75 MCG/KG/MIN: 10 INJECTION, EMULSION INTRAVENOUS at 12:10

## 2018-10-01 RX ADMIN — ACETAMINOPHEN 1000 MG: 500 TABLET ORAL at 08:10

## 2018-10-01 RX ADMIN — EPHEDRINE SULFATE 10 MG: 50 INJECTION, SOLUTION INTRAMUSCULAR; INTRAVENOUS; SUBCUTANEOUS at 02:10

## 2018-10-01 RX ADMIN — SODIUM CHLORIDE: 0.9 INJECTION, SOLUTION INTRAVENOUS at 12:10

## 2018-10-01 RX ADMIN — PREGABALIN 150 MG: 75 CAPSULE ORAL at 08:10

## 2018-10-01 RX ADMIN — ROPIVACAINE HYDROCHLORIDE 20 ML: 5 INJECTION, SOLUTION EPIDURAL; INFILTRATION; PERINEURAL at 03:10

## 2018-10-01 RX ADMIN — EPHEDRINE SULFATE 10 MG: 50 INJECTION, SOLUTION INTRAMUSCULAR; INTRAVENOUS; SUBCUTANEOUS at 01:10

## 2018-10-01 RX ADMIN — SODIUM CHLORIDE: 0.9 INJECTION, SOLUTION INTRAVENOUS at 02:10

## 2018-10-01 RX ADMIN — CELECOXIB 400 MG: 100 CAPSULE ORAL at 08:10

## 2018-10-01 RX ADMIN — MIDAZOLAM 2 MG: 1 INJECTION INTRAMUSCULAR; INTRAVENOUS at 12:10

## 2018-10-01 RX ADMIN — SODIUM CHLORIDE: 0.9 INJECTION, SOLUTION INTRAVENOUS at 01:10

## 2018-10-01 NOTE — ANESTHESIA PROCEDURE NOTES
Peripheral    Patient location during procedure: post-op   Block not for primary anesthetic.  Reason for block: at surgeon's request and post-op pain management   Post-op Pain Location: Left knee  Start time: 10/1/2018 3:22 PM  Timeout: 10/1/2018 3:21 PM   End time: 10/1/2018 3:30 PM  Staffing  Anesthesiologist: Daren Smith MD  Resident/CRNA: Rupesh Benites MD  Performed: resident/CRNA   Preanesthetic Checklist  Completed: patient identified, site marked, surgical consent, pre-op evaluation, timeout performed, IV checked, risks and benefits discussed and monitors and equipment checked  Peripheral Block  Patient position: supine  Prep: ChloraPrep  Patient monitoring: heart rate, continuous pulse ox, continuous capnometry, frequent blood pressure checks and cardiac monitor  Block type: adductor canal, I PACK and lateral femoral cutaneous  Laterality: left  Injection technique: single shot  Needle  Needle type: Stimuplex   Needle gauge: 19 G  Needle length: 2 in  Needle localization: ultrasound guidance   -ultrasound image captured on disc.  Assessment  Injection assessment: negative aspiration, negative parasthesia and local visualized surrounding nerve  Heart rate change: no  Slow fractionated injection: yes  Additional Notes  Diluted 20mL 0.5% ropi with 20mL normal saline and 4mg decadron

## 2018-10-01 NOTE — PLAN OF CARE
Follow-up With  Details  Why  Contact Info   Damon Lancaster MD  On 10/23/2018  @11:15am  [address]  [address]  [place] [address]  [phone]         Future Appointments   Date Time Provider Department Center   10/2/2018  3:00 PM Elisabeth Sheikh, PT [facility] [place] [facility]   10/3/2018 10:00 AM Elisabeth Aguilar, PT [facility] [place] [facility]   10/5/2018 11:00 AM Elisabeth Aguilar, PT [facility] [place] [facility]   10/8/2018 10:00 AM Elisabeth Aguilar, PT [facility] [place] [facility]   10/9/2018 10:00 AM Elisabeth Aguilar, PT [facility] [place] [facility]   10/11/2018 10:00 AM Elisabeth Aguilar, PT [facility] [place] [facility]   10/12/2018 11:00 AM Elisabeth Aguilar, PT [facility] [place] [facility]   10/15/2018 10:00 AM Elisabeth Sheikh, PT [facility] [place] [facility]   10/17/2018 10:00 AM Elisabeth Aguilar, PT [facility] [place] [facility]   10/19/2018 11:00 AM Elisabeth Aguilar, PT [facility] [place] [facility]   10/22/2018 10:00 AM Elisabeth Aguilar, PT [facility] [place] [facility]   10/24/2018 10:00 AM Elisabeth Aguilar, PT [facility] [place] [facility]   10/26/2018 10:00 AM Elisabeth Aguilar, PT [facility] [place] [facility]   10/29/2018 10:00 AM Elisabeth Aguilar, PT [facility] [place] [facility]   10/31/2018 10:00 AM Elisabeth Aguilar, PT [facility] [place] [facility]   11/5/2018 10:00 AM Elisabeth Aguilar, PT [facility] [place] [facility]   11/7/2018 10:00 AM Elisabeth Aguilar, PT [facility] [place] [facility]

## 2018-10-01 NOTE — ANESTHESIA POSTPROCEDURE EVALUATION
"Anesthesia Post Evaluation    Patient: Aga Rodriges    Procedure(s) Performed: Procedure(s) (LRB):  ARTHROPLASTY, KNEE, SIGHT ASSISTED (Left)    Final Anesthesia Type: spinal  Patient location during evaluation: PACU  Patient participation: Yes- Able to Participate  Level of consciousness: awake and alert, oriented and awake  Post-procedure vital signs: reviewed and stable  Pain management: adequate  Airway patency: patent  PONV status at discharge: No PONV  Anesthetic complications: no      Cardiovascular status: blood pressure returned to baseline  Respiratory status: unassisted and room air  Hydration status: euvolemic  Follow-up not needed.        Visit Vitals  /73   Pulse 62   Temp 36.3 °C (97.3 °F) (Skin)   Resp 11   Ht 5' 2" (1.575 m)   Wt 64.9 kg (143 lb)   SpO2 100%   Breastfeeding? No   BMI 26.16 kg/m²       Pain/Nadia Score: Pain Assessment Performed: Yes (10/1/2018  2:55 PM)  Presence of Pain: denies (10/1/2018  2:55 PM)  Pain Rating Prior to Med Admin: 0 (10/1/2018  8:33 AM)  Nadia Score: 8 (10/1/2018  2:55 PM)        "

## 2018-10-01 NOTE — PT/OT/SLP EVAL
Occupational Therapy   Evaluation/Discharge    Name: Aga Rodriges  MRN: [id number]  Admitting Diagnosis:  <principal problem not specified> Day of Surgery    Recommendations:     Discharge Recommendations: outpatient PT  Discharge Equipment Recommendations:  none  Barriers to discharge:  None    History:     Occupational Profile:  Living Environment: Lives w/friend in [place], [place], tub/shower combo  Previous level of function: indep  Roles and Routines: cuts grass, cooks  Equipment Used at Home:  none(has BSC, TTB, RW)  Assistance upon Discharge: family    Past Medical History:   Diagnosis Date    Acid reflux     Hypertension        Past Surgical History:   Procedure Laterality Date    CARPAL TUNNEL RELEASE Left     CRYOTHERAPY, NERVE, PERIPHERAL, PERCUTANEOUS, USING LIQUID NITROUS OXIDE IN CLOSED NEEDLE DEVICE Left 9/27/2018    Performed by KANA Sullivan at [facility] OR    HYSTERECTOMY      PERCUTANEOUS CRYOTHERAPY OF PERIPHERAL NERVE USING LIQUID NITROUS OXIDE IN CLOSED NEEDLE DEVICE Left 9/27/2018    Procedure: CRYOTHERAPY, NERVE, PERIPHERAL, PERCUTANEOUS, USING LIQUID NITROUS OXIDE IN CLOSED NEEDLE DEVICE;  Surgeon: KANA Sullivan;  Location: [facility] OR;  Service: Orthopedics;  Laterality: Left;       Subjective     Chief Complaint: numbness  Patient/Family Comments/goals: regain indep, decrease pain    Pain/Comfort:  · Pain Rating 1: 0/10  · Pain Rating Post-Intervention 1: 0/10    Patients cultural, spiritual, Islam conflicts given the current situation:      Objective:     Communicated with: nurse prior to session.  Patient found all lines intact, call button in reach and family present and peripheral IV upon OT entry to room.    General Precautions: Standard, fall   Orthopedic Precautions:    Braces:       Occupational Performance:    Bed Mobility:    · Patient completed Rolling/Turning to Right with supervision  · Patient completed Scooting/Bridging with supervision  · Patient completed Supine  "to Sit with supervision  · Patient completed Sit to Supine with supervision    Functional Mobility/Transfers:  · Patient completed Sit <> Stand Transfer with contact guard assistance  with  rolling walker   · Patient completed Toilet Transfer Step Transfer technique with minimum assistance with  rolling walker  · Functional Mobility: min A w/RW-defer to PT for details    Activities of Daily Living:  · Upper Body Dressing: indep post setup    · Lower Body Dressing: contact guard assistance and minimum assistance    · Toileting: contact guard assistance      Cognitive/Visual Perceptual:  AO4    Physical Exam:  BUE AROM/strength WFL  Good sit balance, fair stand balance    AMPAC 6 Click ADL:  AMPAC Total Score: 21    Treatment & Education:  Pt participating in initial OT/PT evaluations this date. Pt educated on adaptive dsg post surgery, home safety, car/toilet/shower/tub t/fs, use of DME for functional mobility and ADLs. Pt/fam given gait belt and instructed in use.  Pt to attend OP PT tomorrow's date. D/c OT    Education:    Patient left HOB elevated with all lines intact, call button in reach and nurse and family present    Assessment:     Aga Rodriges is a 78 y.o. female with a medical diagnosis of <principal problem not specified>.  She presents with the following performance deficits affecting function: weakness, impaired endurance, decreased lower extremity function, decreased ROM, gait instability, impaired self care skills.      Rehab Prognosis: Good; patient would benefit from acute skilled OT services to address these deficits and reach maximum level of function.         Clinical Decision Makin.  OT Low:  "Pt evaluation falls under low complexity for evaluation coding due to performance deficits noted in 1-3 areas as stated above and 0 co-morbities affecting current functional status. Data obtained from problem focused assessments. No modifications or assistance was required for completion " "of evaluation. Only brief occupational profile and history review completed."     Plan:     Patient to be seen   to address the above listed problems via    · Plan of Care Expires:    · Plan of Care Reviewed with: patient, family    This Plan of care has been discussed with the patient who was involved in its development and understands and is in agreement with the identified goals and treatment plan    GOALS:   Multidisciplinary Problems     Occupational Therapy Goals        Problem: Occupational Therapy Goal    Goal Priority Disciplines Outcome Interventions   Occupational Therapy Goal     OT, PT/OT Ongoing (interventions implemented as appropriate)                    Time Tracking:     OT Date of Treatment: 10/01/18  OT Start Time: 1709  OT Stop Time: 1742  OT Total Time (min): 33 min w/PT    Billable Minutes:Evaluation 15  Self Care/Home Management 15    Walter Geronimo OT  10/1/2018    "

## 2018-10-01 NOTE — PLAN OF CARE
Pt urinated large amount of clear yellow urine on bedpan. Denies pain or discomfort @ this time. Tolerating liquids with no difficulty. Waiting for physical therapy. Family remains @ bs. Cont to monitor.

## 2018-10-01 NOTE — PLAN OF CARE
Pt denies pain or discomfort @ this time. Pt states she is ready to be discharged home @ this time. PIV dc'd with tip intact. Dressing placed. Discharge instructions reviewed with patient and family, with time allotted for questions. Pt and family verbalize understanding of instructions and state they have no further questions @ this time. Wheeled out to family's car.

## 2018-10-01 NOTE — PLAN OF CARE
Problem: Physical Therapy Goal  Goal: Physical Therapy Goal  Outcome: Outcome(s) achieved Date Met: 10/01/18  PT evaluation completed, note to follow. Pt and pt's family educated on gait training with RW, step negotiation, car transfers, LLE positioning, LLE exercises, and use of ice pack for pain management. Plan for pt to d/c home with OP PT to begin tomorrow's date.

## 2018-10-01 NOTE — PLAN OF CARE
Problem: Occupational Therapy Goal  Goal: Occupational Therapy Goal  Outcome: Ongoing (interventions implemented as appropriate)  OT eval performed, report to follow    OP PT

## 2018-10-01 NOTE — PT/OT/SLP EVAL
Physical Therapy Evaluation and Discharge Note    Patient Name:  Aga Rodriges   MRN:  [id number]    Recommendations:     Discharge Recommendations:  outpatient PT   Discharge Equipment Recommendations: none   Barriers to discharge: None    Assessment:     Aga Rodriges is a 78 y.o. female s/p L TKA (day of surgery). Pt and pt's family educated on gait training with RW, step negotiation, car transfers, LLE positioning, LLE exercises, and use of ice pack for pain management. Plan for pt to d/c home with OP PT to begin tomorrow's date.    Recent Surgery: Procedure(s) (LRB):  ARTHROPLASTY, KNEE, SIGHT ASSISTED (Left) Day of Surgery    Plan:     During this hospitalization, patient does not require further acute PT services.  Please re-consult if situation changes.      Subjective     Chief Complaint: none  Patient/Family Comments/goals: pt and pt's family reporting they feels comfortable with pt d/c home today and have no further questions  Pain/Comfort:  · Pain Rating 1: 0/10  · Pain Rating Post-Intervention 1: 0/10    Patients cultural, spiritual, Nondenominational conflicts given the current situation: none reported    Living Environment:  Pt [facility] her friend [facility] with [place] and tub/shower combo.  Prior to admission, patients level of function was independent without AD for mobility, ADLs, drives, and cuts grass-- reporting she was able to do everything but it was painful.  Equipment used at home: none(ordered and delivered: RW, TTB, BSC).  DME owned (not currently used): rolling walker, bedside commode and transfer tub bench.  Upon discharge, patient will have assistance from her friend and sister- her sister will be staying with her for a week.    Objective:     Communicated with RN prior to session.  Patient found supine with HOB elevated upon PT entry to room found with:       General Precautions: Standard, fall   Orthopedic Precautions:LLE weight bearing as tolerated   Braces: N/A  "    Exams:  · Postural Exam:  Patient presented with the following abnormalities:    · -       No postural abnormalities identified  · Sensation:    · -       Impaired  light/touch numbness to L thigh  · Skin Integrity/Edema:      · -       Skin integrity: surgical incision L knee  · RLE ROM: WFL  · RLE Strength: WFL  · LLE ROM: WFL: L knee AROM ~0-70 deg  · LLE Strength: ankle DF WFLs, knee/hip not assessed but full ROM    Functional Mobility:  · Bed Mobility:     · Scooting: modified independence  · Supine to Sit: supervision  · Sit to Supine: supervision  · Transfers:     · Sit to Stand:  contact guard assistance with rolling walker  · Toilet Transfer: contact guard assistance and minimum assistance with  rolling walker  using  Step Transfer  · Gait: 20 ft and 50 ft with 2 episodes of L knee buckling during 1st bout requiring min A for steadying and improved stability 2nd bout with CGA throughout - cues for 3-point gait pattern and increased UE WB  · Stairs:  Pt ascended/descended 6" curb step with Rolling Walker with no handrails with Contact Guard Assistance.     AM-PAC 6 CLICK MOBILITY  Total Score:18       Therapeutic Activities and Exercises:  Pt and pt's family educated on gait training with RW, step negotiation, car transfers, LLE positioning, LLE exercises, and use of ice pack for pain management.   Demonstrated car transfer and pt negotiated 6" step to simulate threshold entrance.  Pt with LLE numbness and occasional L knee buckling, primarily with posterior stepping.  Provided pt's daughter and friend/roommate with gait belt and instructed in use of gait belt every time pt stands today for safety.    Patient left HOB elevated with all lines intact, call button in reach and RN adn pt's family present.    GOALS:   Multidisciplinary Problems     Physical Therapy Goals     Not on file          Multidisciplinary Problems (Resolved)        Problem: Physical Therapy Goal    Goal Priority Disciplines Outcome " Goal Variances Interventions   Physical Therapy Goal   (Resolved)     PT, PT/OT Outcome(s) achieved                     History:     Past Medical History:   Diagnosis Date    Acid reflux     Hypertension        Past Surgical History:   Procedure Laterality Date    CARPAL TUNNEL RELEASE Left     CRYOTHERAPY, NERVE, PERIPHERAL, PERCUTANEOUS, USING LIQUID NITROUS OXIDE IN CLOSED NEEDLE DEVICE Left 9/27/2018    Performed by KANA Sullivan at [facility] OR    HYSTERECTOMY      PERCUTANEOUS CRYOTHERAPY OF PERIPHERAL NERVE USING LIQUID NITROUS OXIDE IN CLOSED NEEDLE DEVICE Left 9/27/2018    Procedure: CRYOTHERAPY, NERVE, PERIPHERAL, PERCUTANEOUS, USING LIQUID NITROUS OXIDE IN CLOSED NEEDLE DEVICE;  Surgeon: KANA Sullivan;  Location: [facility] OR;  Service: Orthopedics;  Laterality: Left;       Clinical Decision Making:     History  Co-morbidities and personal factors that may impact the plan of care Examination  Body Structures and Functions, activity limitations and participation restrictions that may impact the plan of care Clinical Presentation   Decision Making/ Complexity Score   Co-morbidities:   [] Time since onset of injury / illness / exacerbation  [] Status of current condition  []Patient's cognitive status and safety concerns    [] Multiple Medical Problems (see med hx)  Personal Factors:   [] Patient's age  [] Prior Level of function   [] Patient's home situation (environment and family support)  [] Patient's level of motivation  [] Expected progression of patient      HISTORY:(criteria)    [x] 48409 - no personal factors/history    [] 80650 - has 1-2 personal factor/comorbidity     [] 39899 - has >3 personal factor/comorbidity     Body Regions:  [] Objective examination findings  [] Head     []  Neck  [] Trunk   [] Upper Extremity  [x] Lower Extremity    Body Systems:  [] For communication ability, affect, cognition, language, and learning style: the assessment of the ability to make needs known,  consciousness, orientation (person, place, and time), expected emotional /behavioral responses, and learning preferences (eg, learning barriers, education  needs)  [x] For the neuromuscular system: a general assessment of gross coordinated movement (eg, balance, gait, locomotion, transfers, and transitions) and motor function  (motor control and motor learning)  [x] For the musculoskeletal system: the assessment of gross symmetry, gross range of motion, gross strength, height, and weight  [] For the integumentary system: the assessment of pliability(texture), presence of scar formation, skin color, and skin integrity  [] For cardiovascular/pulmonary system: the assessment of heart rate, respiratory rate, blood pressure, and edema     Activity limitations:    [] Patient's cognitive status and saf ety concerns          [] Status of current condition      [] Weight bearing restriction  [] Cardiopulmunary Restriction    Participation Restrictions:   [] Goals and goal agreement with the patient     [] Rehab potential (prognosis) and probable outcome      Examination of Body System: (criteria)    [x] 20974 - addressing 1-2 elements    [] 05514 - addressing a total of 3 or more elements     [] 54794 -  Addressing a total of 4 or more elements         Clinical Presentation: (criteria)  Stable - 17414     On examination of body system using standardized tests and measures patient presents with 1-2 elements from any of the following: body structures and functions, activity limitations, and/or participation restrictions.  Leading to a clinical presentation that is considered stable and/or uncomplicated                              Clinical Decision Making  (Eval Complexity):  Low- 37671     Time Tracking:     PT Received On: 10/01/18  PT Start Time: 1709     PT Stop Time: 1742  PT Total Time (min): 33 min with OT    Billable Minutes: Evaluation 15      Sonja Alvarez, PT  10/01/2018

## 2018-10-01 NOTE — PROGRESS NOTES
I have reviewed the notes, assessments, and/or procedures performed, I concur with her/his documentation of Aga Rodriges.

## 2018-10-01 NOTE — OP NOTE
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     PROCEDURE: Computer-assisted left total knee arthroplasty with resurfaced patella.     ATTENDING SURGEON: Damon Lancaster M.D.   ASSISTANT: Dr. Ashley, elyse claros     Risk Adjustment: Please see media tab for any additional diagnoses faxed from my office related to theTKA.    COMPLICATIONS: None.     IMPLANTS:   1. [employer] U2 tibial tray, size 3  2. [employer] U2 Femoral component, size 4 left  3. [employer] U2 all-poly patella, size 35    4. [employer] U2 posterior stabilized highly crosslinked  polyethylene, 9 mm height.   5. antibiotic bone cement x2     INDICATIONS FOR PROCEDURE: This is a 78 y.o. female with longstanding knee pain. They have failed nonoperative management including injections. Risks and benefits of total knee arthroplasty were explained to the patient. The patient agreed to move forward with total knee arthroplasty.     FINDINGS: The patient had a complete articular cartilage loss down to subchondral bone throughout the knee.     PROCEDURE IN DETAIL: The patient had adductor nerve block prior to entry to the OR. The patient was then brought to the Operating Room and spinal anesthesia started without any difficulties. The patient was placed supine on the operative table. Salguero catheter was then placed and removed at the end of the case. Right knee was then prepped and draped in sterile fashion. Prior to incision, proper site and procedure as well as antibiotic administration was verified. AFCN and ISN nerves were then injected with marcaine. Anterior midline incision was created overlying center patella proximally to the medial border of the tibial tubercle distally. Skin, subcutaneous fat and deep fascial layer were sharply incised until we came to extensor mechanism retinaculum. Flap was then elevated  medially to VMO and laterally to lateral border of patella. Knee was then aspirated and synovial fluid sent for cell count. Medial parapatellar arthrotomy was injected with Marcaine with epinephrine and a medial parapatellar arthrotomy was then created. Synovium overlying the anterolateral distal femur was then excised as well as the infrapatellar tendon fat pad. Sleeve of soft tissue was elevated off the proximal medial tibia. Periphery of the patella was denervated using bovie cautery, Hohmann retractors then placed medially and laterally along the distal femur to protect the collateral ligaments. ACL and anterior horn of lateral meniscus were then transected. We then pinned our navigation tracker on to distal femur and then performed our anatomy survey for the femur. We placed distal femoral cutting guide such that we were perpendicular to mechanical axis, aiming for about 1 degree of flexion and about 9 mm of bony resection. Distal femur was then resected. Next, we placed AP sizing guide on distal femur and measured for a size 3 femoral component. We then erick out Whitesides line and placed our AP cutting block such that we were perpendicular to Whitesides line and created 2 pin holes in 3  degrees of external rotation relative to the posterior condylar axis.being parallel to transepicondylar axis and perpidicualar to connies line. Resected posterior femoral bone measured approx. 3 mm in difference with the lateral piece thinner than the medial piece. Next, we subluxed the tibia forward and resected medial and lateral menisci. We then pinned our navigation tracker on to the proximal tibia and then performed our anatomy survey for tibia. We placed our proximal tibial cutting guide such that we were perpendicular to mechanical axis, aiming for about 1 to 2 mm of bony resection medially and about 3 degrees posterior slope. Proximal tibial bone was then resected and detached from posterior soft tissues using  Bovie cautery. Next, with knee at 90 degrees, we placed a laminar  in lateral compartment and resected the ACL and the PCL as well as small osteophytes posteriorly along the femur. We then injected the posterior capsule with marcaine. We then assessed our gaps using a 9mm spacer block. With a  9 mm spacer block, the knee came out to full extension with nice stability with varus and valgus stress, and nice symmetry between flexion and extension. We assessed our tibial cut and our alignment jose carlos fell within the center of the ankle. Once we were happy with soft tissue sleeves and bony cuts, we then placed tibial protector on the proximal tibia and our chamfer cutting guide for the femur. We then created our chamfer cuts. We then placed our box cutting guide as lateral as possible while maintaining a symmetric condylar resection and reamed for our box cut. Next, we subluxed the tibia forward, and sized our proximal tibia for a size 3 baseplate, the center of which was rotated such that it was in line with medial third tibial tubercle. This was then pinned in place. We then trialed using a 9 trial polyethylene. With 9 mm polyethylene, the knee came out to full extension. We had nice stability with varus and valgus stress and nice symmetry between flexion and extension. Patella tracked centrally within trochlear groove. We then everted our patella and measured our patellar  thickness for 23 mm. We then resected down to approximately 14 mm of remaining bone. We then sized patella for a 35 patellar button. Three peg holes were then drilled for the patellar button and a patellar trial was then placed. We then assessed our tracking. Patella tracked centrally within trochlear groove. Once we were happy with all our trial components, we then removed all our trial components, except the tibial baseplate. We then reamed and broached for the tibial baseplate. We then drilled the sclerotic bone along the tibia using a short  (4mm) drill bit as well. We then placed the knee in extension and examined the posterior aspect of knee for bleeding. Once we achieve hemostasis, we then packed the knee and inflated the tourniquet. We then prepared our bony surfaces for cementation using pulse lavage antibiotic saline. Femoral component cement was then placed using a pressurized cement gun and then the femoral component impacted and all excess bony cement removed from the periphery and box. Cement gun was then used to cement the tibial bone and then the tibial component was impacted using direct impaction and all excess bony cement was removed from the periphery.We then placed a 9 mm final polyethylene, reduced the knee, everted the patella and cemented the patellar component on last. We then paused to allow for cement to harden. Once cement was hardened, we then let the tourniquet down and reexamined our gaps, stability and tracking; all of which remained unchanged. We then copiously irrigated the knee with pulse lavage betadine saline. We then closed our medial parapatellar arthrotomy with a running #2 barbed suture, subcutaneous deep fascial layer was closed with simple interrupted # 1 vicryl suture, subcutaneous skin with 2-0 Vicryl and incision with Dermabond and Steri-Strips, was then dressed with silver water proof dressing. The patient was then transferred to Recovery Room bed in stable condition.

## 2018-10-01 NOTE — DISCHARGE INSTRUCTIONS
1. Enteric coated aspirin 81 mg by mouth twice a day for 6 weeks to prevent blood clots,  unless otherwise indicated.  2. Please take a stomach reflux medication such as pepcid, prevacid, nexium (H-2 blocker or PPI) while on aspirin to prevent stomach ulcers. You will be given a prescription for pepcid.  3. Bryce stockings should be worn as much as possible for 6 weeks to prevent blood clots.  4. Do not start antibiotics for any suspected infections related to the surgery until evaluated by dr lezama staff  5. No driving for approx. 2-4 weeks  6. You can shower once the incision is completely dry, otherwise place a new dry dressing twice a day if there is drainage. Please call the office if the drainage increases after discharge.  7. You may resume all pre-surgery medications unless otherwise indicated  8. All patients should be seen in Dr Lezama office approx 2 weeks after surgery  9. Dr Lancaster prefers outpatient physical therapy upon discharge home. If home PT is needed, please contact Dr Lancaster for approval  10. Patients should see their primary care doctor after discharge home  11. If you are sent to a rehab/nursing facility please notify dr lezama office once discharged.  12. Only lortab or Percocet should be given for pain medications on discharge. You will be given a prescription for 1 pill every 4 hrs as needed for the first 2 weeks. For weeks 2-6 you can receive a prescription for Percocet or lortab 1 pill every 6 hrs as needed. For weeks 6-12 you can receive a prescription for 1 pill every 8 hrs as needed. There will be no pain medications given after 12 weeks. After 12 weeks, you should take Tylenol, motrin, advil, aleve for pain control.  13. Please take a stomach reflux medication such as pepcid, prevacid, nexium (H-2 blocker or PPI) while on anti inflammatory medications such as advil, aleve, motrin to prevent stomach ulcers.      ANESTHESIA  -For the first 24 hours after surgery:  Do not drive, use  heavy equipment, make important decisions, or drink alcohol  -It is normal to feel sleepy for several hours.  Rest until you are more awake.  -Have someone stay with you, if needed.  They can watch for problems and help keep you safe.  -Some possible post anesthesia side effects include: nausea and vomiting, sore throat and hoarseness, sleepiness, and dizziness.    PAIN  -If you have pain after surgery, pain medicine will help you feel better.  Take it as directed, before pain becomes severe.  Most pain relievers taken by mouth need at least 20-30 minutes to start working.  -Do not drive or drink alcohol while taking pain medicine.  -Pain medication can upset your stomach.  Taking them with a little food may help.  -Other ways to help control pain: elevation, ice, and relaxation  -Call your surgeon if still having unmanageable pain an hour after taking pain medicine.  -Pain medicine can cause constipation.  Taking an over-the counter stool softener while on prescription pain medicine and drinking plenty of fluids can prevent this side effect.  -Call your surgeon if you have severe side effects like: breathing problems, trouble waking up, dizziness, confusion, or severe constipation.    NAUSEA  -Some people have nausea after surgery.  This is often because of anesthesia, pain, pain medicine, or the stress of surgery.  -Do not push yourself to eat.  Start off with clear liquids and soup.  Slowly move to solid foods.  Don't eat fatty, rich, spicy foods at first.  Eat smaller amounts.  -If you develop persistent nausea and vomiting please notify your surgeon immediately.    BLEEDING  -Different types of surgery require different types of care and dressing changes.  It is important to follow all instructions and advice from your surgeon.  Change dressing as directed.  Call your surgeon for any concerns regarding postop bleeding.    SIGNS OF INFECTION  -Signs of infection include: fever, swelling, drainage, and  redness  -Notify your surgeon if you have a fever of 100.4 F (38.0 C) or higher.  -Notify your surgeon if you notice redness, swelling, increased pain, pus, or a foul smell at the incision site.        Aspirin, ASA oral tablets  What is this medicine?  ASPIRIN (AS pir in) is a pain reliever. It is used to treat mild pain and fever. This medicine is also used as directed by a doctor to prevent and to treat heart attacks, to prevent strokes, and to treat arthritis or inflammation.  How should I use this medicine?  Take this medicine by mouth with a glass of water. Follow the directions on the package or prescription label. You can take this medicine with or without food. If it upsets your stomach, take it with food. Do not take your medicine more often than directed.  Talk to your pediatrician regarding the use of this medicine in children. While this drug may be prescribed for children as young as 12 years of age for selected conditions, precautions do apply. Children and teenagers should not use this medicine to treat chicken pox or flu symptoms unless directed by a doctor.  Patients over 65 years old may have a stronger reaction and need a smaller dose.  What side effects may I notice from receiving this medicine?  Side effects that you should report to your doctor or health care professional as soon as possible:  · allergic reactions like skin rash, itching or hives, swelling of the face, lips, or tongue  · breathing problems  · changes in hearing, ringing in the ears  · confusion  · general ill feeling or flu-like symptoms  · pain on swallowing  · redness, blistering, peeling or loosening of the skin, including inside the mouth or nose  · signs and symptoms of bleeding such as bloody or black, tarry stools; red or dark-brown urine; spitting up blood or brown material that looks like coffee grounds; red spots on the skin; unusual bruising or bleeding from the eye, gums, or nose  · trouble passing urine or change  in the amount of urine  · unusually weak or tired  · yellowing of the eyes or skin  Side effects that usually do not require medical attention (report to your doctor or health care professional if they continue or are bothersome):  · diarrhea or constipation  · headache  · nausea, vomiting  · stomach gas, heartburn  What may interact with this medicine?  Do not take this medicine with any of the following medications:  · cidofovir  · ketorolac  · probenecid  This medicine may also interact with the following medications:  · alcohol  · alendronate  · bismuth subsalicylate  · flavocoxid  · herbal supplements like feverfew, garlic, daisy, ginkgo biloba, horse chestnut  · medicines for diabetes or glaucoma like acetazolamide, methazolamide  · medicines for gout  · medicines that treat or prevent blood clots like enoxaparin, heparin, ticlopidine, warfarin  · other aspirin and aspirin-like medicines  · NSAIDs, medicines for pain and inflammation, like ibuprofen or naproxen  · pemetrexed  · sulfinpyrazone  · varicella live vaccine  What if I miss a dose?  If you are taking this medicine on a regular schedule and miss a dose, take it as soon as you can. If it is almost time for your next dose, take only that dose. Do not take double or extra doses.  Where should I keep my medicine?  Keep out of the reach of children.  Store at room temperature between 15 and 30 degrees C (59 and 86 degrees F). Protect from heat and moisture. Do not use this medicine if it has a strong vinegar smell. Throw away any unused medicine after the expiration date.  What should I tell my health care provider before I take this medicine?  They need to know if you have any of these conditions:  · anemia  · asthma  · bleeding problems  · child with chickenpox, the flu, or other viral infection  · diabetes  · gout  · if you frequently drink alcohol containing drinks  · kidney disease  · liver disease  · low level of vitamin K  · lupus  · smoke  tobacco  · stomach ulcers or other problems  · an unusual or allergic reaction to aspirin, tartrazine dye, other medicines, dyes, or preservatives  · pregnant or trying to get pregnant  · breast-feeding  What should I watch for while using this medicine?  If you are treating yourself for pain, tell your doctor or health care professional if the pain lasts more than 10 days, if it gets worse, or if there is a new or different kind of pain. Tell your doctor if you see redness or swelling. Also, check with your doctor if you have a fever that lasts for more than 3 days. Only take this medicine to prevent heart attacks or blood clotting if prescribed by your doctor or health care professional.  Do not take aspirin or aspirin-like medicines with this medicine. Too much aspirin can be dangerous. Always read the labels carefully.  This medicine can irritate your stomach or cause bleeding problems. Do not smoke cigarettes or drink alcohol while taking this medicine. Do not lie down for 30 minutes after taking this medicine to prevent irritation to your throat.  If you are scheduled for any medical or dental procedure, tell your healthcare provider that you are taking this medicine. You may need to stop taking this medicine before the procedure.  This medicine may be used to treat migraines. If you take migraine medicines for 10 or more days a month, your migraines may get worse. Keep a diary of headache days and medicine use. Contact your healthcare professional if your migraine attacks occur more frequently.  NOTE:This sheet is a summary. It may not cover all possible information. If you have questions about this medicine, talk to your doctor, pharmacist, or health care provider. Copyright© 2017 [employer]          Cephalexin tablets or capsules  What is this medicine?  CEPHALEXIN (sef a ROB in) is a cephalosporin antibiotic. It is used to treat certain kinds of bacterial infections It will not work for colds, flu, or  other viral infections.  How should I use this medicine?  Take this medicine by mouth with a full glass of water. Follow the directions on the prescription label. This medicine can be taken with or without food. Take your medicine at regular intervals. Do not take your medicine more often than directed. Take all of your medicine as directed even if you think you are better. Do not skip doses or stop your medicine early.  Talk to your pediatrician regarding the use of this medicine in children. While this drug may be prescribed for selected conditions, precautions do apply.  What side effects may I notice from receiving this medicine?  Side effects that you should report to your doctor or health care professional as soon as possible:  · allergic reactions like skin rash, itching or hives, swelling of the face, lips, or tongue  · breathing problems  · pain or trouble passing urine  · redness, blistering, peeling or loosening of the skin, including inside the mouth  · severe or watery diarrhea  · unusually weak or tired  · yellowing of the eyes, skin  Side effects that usually do not require medical attention (report to your doctor or health care professional if they continue or are bothersome):  · gas or heartburn  · genital or anal irritation  · headache  · joint or muscle pain  · nausea, vomiting  What may interact with this medicine?  · probenecid  · some other antibiotics  What if I miss a dose?  If you miss a dose, take it as soon as you can. If it is almost time for your next dose, take only that dose. Do not take double or extra doses. There should be at least 4 to 6 hours between doses.  Where should I keep my medicine?  Keep out of the reach of children.  Store at room temperature between 59 and 86 degrees F (15 and 30 degrees C). Throw away any unused medicine after the expiration date.  What should I tell my health care provider before I take this medicine?  They need to know if you have any of these  conditions:  · kidney disease  · stomach or intestine problems, especially colitis  · an unusual or allergic reaction to cephalexin, other cephalosporins, penicillins, other antibiotics, medicines, foods, dyes or preservatives  · pregnant or trying to get pregnant  · breast-feeding  What should I watch for while using this medicine?  Tell your doctor or health care professional if your symptoms do not begin to improve in a few days.  Do not treat diarrhea with over the counter products. Contact your doctor if you have diarrhea that lasts more than 2 days or if it is severe and watery.  If you have diabetes, you may get a false-positive result for sugar in your urine. Check with your doctor or health care professional.  NOTE:This sheet is a summary. It may not cover all possible information. If you have questions about this medicine, talk to your doctor, pharmacist, or health care provider. Copyright© 2017 [employer]          Docusate capsules  What is this medicine?  DOCUSATE (doc CUE sayt) is stool softener. It helps prevent constipation and straining or discomfort associated with hard or dry stools.  How should I use this medicine?  Take this medicine by mouth with a glass of water. Follow the directions on the label. Take your doses at regular intervals. Do not take your medicine more often than directed.  Talk to your pediatrician regarding the use of this medicine in children. While this medicine may be prescribed for children as young as 2 years for selected conditions, precautions do apply.  What side effects may I notice from receiving this medicine?  Side effects that you should report to your doctor or health care professional as soon as possible:  · allergic reactions like skin rash, itching or hives, swelling of the face, lips, or tongue  Side effects that usually do not require medical attention (report to your doctor or health care professional if they continue or are  bothersome):  · diarrhea  · stomach cramps  · throat irritation  What may interact with this medicine?  · mineral oil  What if I miss a dose?  If you miss a dose, take it as soon as you can. If it is almost time for your next dose, take only that dose. Do not take double or extra doses.  Where should I keep my medicine?  Keep out of the reach of children.  Store at room temperature between 15 and 30 degrees C (59 and 86 degrees F). Throw away any unused medicine after the expiration date.  What should I tell my health care provider before I take this medicine?  They need to know if you have any of these conditions:  · nausea or vomiting  · severe constipation  · stomach pain  · sudden change in bowel habit lasting more than 2 weeks  · an unusual or allergic reaction to docusate, other medicines, foods, dyes, or preservatives  · pregnant or trying to get pregnant  · breast-feeding  What should I watch for while using this medicine?  Do not use for more than one week without advice from your doctor or health care professional. If your constipation returns, check with your doctor or health care professional.  Drink plenty of water while taking this medicine. Drinking water helps decrease constipation.  Stop using this medicine and contact your doctor or health care professional if you experience any rectal bleeding or do not have a bowel movement after use. These could be signs of a more serious condition.  NOTE:This sheet is a summary. It may not cover all possible information. If you have questions about this medicine, talk to your doctor, pharmacist, or health care provider. Copyright© 2017 [employer]          Famotidine tablets or gelcaps  What is this medicine?  FAMOTIDINE (fa TORI helm) is a type of antihistamine that blocks the release of stomach acid. It is used to treat stomach or intestinal ulcers. It can also relieve heartburn from acid reflux.  How should I use this medicine?  Take this medicine by  mouth with a glass of water. Follow the directions on the prescription label. If you only take this medicine once a day, take it at bedtime. Take your doses at regular intervals. Do not take your medicine more often than directed.  Talk to your pediatrician regarding the use of this medicine in children. Special care may be needed.  What side effects may I notice from receiving this medicine?  Side effects that you should report to your doctor or health care professional as soon as possible:  · agitation, nervousness  · confusion  · hallucinations  · skin rash, itching  Side effects that usually do not require medical attention (report to your doctor or health care professional if they continue or are bothersome):  · constipation  · diarrhea  · dizziness  · headache  What may interact with this medicine?  · delavirdine  · itraconazole  · ketoconazole  What if I miss a dose?  If you miss a dose, take it as soon as you can. If it is almost time for your next dose, take only that dose. Do not take double or extra doses.  Where should I keep my medicine?  Keep out of the reach of children.  Store at room temperature between 15 and 30 degrees C (59 and 86 degrees F). Do not freeze. Throw away any unused medicine after the expiration date.  What should I tell my health care provider before I take this medicine?  They need to know if you have any of these conditions:  · kidney or liver disease  · trouble swallowing  · an unusual or allergic reaction to famotidine, other medicines, foods, dyes, or preservatives  · pregnant or trying to get pregnant  · breast-feeding  What should I watch for while using this medicine?  Tell your doctor or health care professional if your condition does not start to get better or if it gets worse. Finish the full course of tablets prescribed, even if you feel better.  Do not take with aspirin, ibuprofen or other antiinflammatory medicines. These can make your condition worse.  Do not smoke  cigarettes or drink alcohol. These cause irritation in your stomach and can increase the time it will take for ulcers to heal.  If you get black, tarry stools or vomit up what looks like coffee grounds, call your doctor or health care professional at once. You may have a bleeding ulcer.  NOTE:This sheet is a summary. It may not cover all possible information. If you have questions about this medicine, talk to your doctor, pharmacist, or health care provider. Copyright© 2017 [employer]          Acetaminophen; Oxycodone tablets  What is this medicine?  ACETAMINOPHEN; OXYCODONE (a set a JOANNE ailyn fen; ox i KOE done) is a pain reliever. It is used to treat moderate to severe pain.  How should I use this medicine?  Take this medicine by mouth with a full glass of water. Follow the directions on the prescription label. You can take it with or without food. If it upsets your stomach, take it with food. Take your medicine at regular intervals. Do not take it more often than directed.  A special MedGuide will be given to you by the pharmacist with each prescription and refill. Be sure to read this information carefully each time.  Talk to your pediatrician regarding the use of this medicine in children. Special care may be needed.  What side effects may I notice from receiving this medicine?  Side effects that you should report to your doctor or health care professional as soon as possible:  · allergic reactions like skin rash, itching or hives, swelling of the face, lips, or tongue  · breathing problems  · confusion  · redness, blistering, peeling or loosening of the skin, including inside the mouth  · signs and symptoms of liver injury like dark yellow or brown urine; general ill feeling or flu-like symptoms; light-colored stools; loss of appetite; nausea; right upper belly pain; unusually weak or tired; yellowing of the eyes or skin  · signs and symptoms of low blood pressure like dizziness; feeling faint or lightheaded,  falls; unusually weak or tired  · trouble passing urine or change in the amount of urine  Side effects that usually do not require medical attention (report to your doctor or health care professional if they continue or are bothersome):  · constipation  · dry mouth  · nausea, vomiting  · tiredness  What may interact with this medicine?  This medicine may interact with the following medications:  · alcohol  · antihistamines for allergy, cough and cold  · antiviral medicines for HIV or AIDS  · atropine  · certain antibiotics like clarithromycin, erythromycin, linezolid, rifampin  · certain medicines for anxiety or sleep  · certain medicines for bladder problems like oxybutynin, tolterodine  · certain medicines for depression like amitriptyline, fluoxetine, sertraline  · certain medicines for fungal infections like ketoconazole, itraconazole, voriconazole  · certain medicines for migraine headache like almotriptan, eletriptan, frovatriptan, naratriptan, rizatriptan, sumatriptan, zolmitriptan  · certain medicines for nausea or vomiting like dolasetron, ondansetron, palonosetron  · certain medicines for Parkinson's disease like benztropine, trihexyphenidyl  · certain medicines for seizures like phenobarbital, phenytoin, primidone  · certain medicines for stomach problems like dicyclomine, hyoscyamine  · certain medicines for travel sickness like scopolamine  · diuretics  · general anesthetics like halothane, isoflurane, methoxyflurane, propofol  · ipratropium  · local anesthetics like lidocaine, pramoxine, tetracaine  · MAOIs like Carbex, Eldepryl, Marplan, Nardil, and Parnate  · medicines that relax muscles for surgery  · methylene blue  · nilotinib  · other medicines with acetaminophen  · other narcotic medicines for pain or cough  · phenothiazines like chlorpromazine, mesoridazine, prochlorperazine, thioridazine  What if I miss a dose?  If you miss a dose, take it as soon as you can. If it is almost time for your next  dose, take only that dose. Do not take double or extra doses.  Where should I keep my medicine?  Keep out of the reach of children. This medicine can be abused. Keep your medicine in a safe place to protect it from theft. Do not share this medicine with anyone. Selling or giving away this medicine is dangerous and against the law.  This medicine may cause accidental overdose and death if it taken by other adults, children, or pets. Mix any unused medicine with a substance like cat litter or coffee grounds. Then throw the medicine away in a sealed container like a sealed bag or a coffee can with a lid. Do not use the medicine after the expiration date.  Store at room temperature between 20 and 25 degrees C (68 and 77 degrees F).  What should I tell my health care provider before I take this medicine?  They need to know if you have any of these conditions:  · brain tumor  · Crohn's disease, inflammatory bowel disease, or ulcerative colitis  · drug abuse or addiction  · head injury  · heart or circulation problems  · if you often drink alcohol  · kidney disease or problems going to the bathroom  · liver disease  · lung disease, asthma, or breathing problems  · an unusual or allergic reaction to acetaminophen, oxycodone, other opioid analgesics, other medicines, foods, dyes, or preservatives  · pregnant or trying to get pregnant  · breast-feeding  What should I watch for while using this medicine?  Tell your doctor or health care professional if your pain does not go away, if it gets worse, or if you have new or a different type of pain. You may develop tolerance to the medicine. Tolerance means that you will need a higher dose of the medication for pain relief. Tolerance is normal and is expected if you take this medicine for a long time.  Do not suddenly stop taking your medicine because you may develop a severe reaction. Your body becomes used to the medicine. This does NOT mean you are addicted. Addiction is a  behavior related to getting and using a drug for a non-medical reason. If you have pain, you have a medical reason to take pain medicine. Your doctor will tell you how much medicine to take. If your doctor wants you to stop the medicine, the dose will be slowly lowered over time to avoid any side effects.  There are different types of narcotic medicines (opiates). If you take more than one type at the same time or if you are taking another medicine that also causes drowsiness, you may have more side effects. Give your health care provider a list of all medicines you use. Your doctor will tell you how much medicine to take. Do not take more medicine than directed. Call emergency for help if you have problems breathing or unusual sleepiness.  Do not take other medicines that contain acetaminophen with this medicine. Always read labels carefully. If you have questions, ask your doctor or pharmacist.  If you take too much acetaminophen get medical help right away. Too much acetaminophen can be very dangerous and cause liver damage. Even if you do not have symptoms, it is important to get help right away.  You may get drowsy or dizzy. Do not drive, use machinery, or do anything that needs mental alertness until you know how this medicine affects you. Do not stand or sit up quickly, especially if you are an older patient. This reduces the risk of dizzy or fainting spells. Alcohol may interfere with the effect of this medicine. Avoid alcoholic drinks.  The medicine will cause constipation. Try to have a bowel movement at least every 2 to 3 days. If you do not have a bowel movement for 3 days, call your doctor or health care professional.  Your mouth may get dry. Chewing sugarless gum or sucking hard candy, and drinking plenty or water may help. Contact your doctor if the problem does not go away or is severe.  NOTE:This sheet is a summary. It may not cover all possible information. If you have questions about this  medicine, talk to your doctor, pharmacist, or health care provider. Copyright© 2017 [employer]

## 2018-10-01 NOTE — INTERVAL H&P NOTE
The patient has been examined and the H&P has been reviewed:    I concur with the findings and no changes have occurred since H&P was written.    Anesthesia/Surgery risks, benefits and alternative options discussed and understood by patient/family.          Active Hospital Problems    Diagnosis  POA    Osteoarthritis of left knee [M17.12]  Yes      Resolved Hospital Problems   No resolved problems to display.

## 2018-10-01 NOTE — PLAN OF CARE
Physical therapy states patient is good for discharge @ this time. Dr Ashley updated on pt and pt status. No orders rec'd. Pt and family state they are comfortable with discharge @ this time.

## 2018-10-01 NOTE — TRANSFER OF CARE
"Anesthesia Transfer of Care Note    Patient: Aga Rodriges    Procedure(s) Performed: Procedure(s) (LRB):  ARTHROPLASTY, KNEE, SIGHT ASSISTED (Left)    Patient location: PACU    Anesthesia Type: spinal and MAC    Transport from OR: Transported from OR on 6-10 L/min O2 by face mask with adequate spontaneous ventilation    Post pain: adequate analgesia    Post assessment: no apparent anesthetic complications    Post vital signs: stable    Level of consciousness: awake    Nausea/Vomiting: no nausea/vomiting    Complications: none    Transfer of care protocol was followed      Last vitals:   Visit Vitals  BP (!) 168/74 (BP Location: Right arm, Patient Position: Lying)   Pulse 62   Temp 36.6 °C (97.9 °F) (Skin)   Resp 18   Ht 5' 2" (1.575 m)   Wt 64.9 kg (143 lb)   SpO2 100%   Breastfeeding? No   BMI 26.16 kg/m²     "

## 2018-10-02 ENCOUNTER — CLINICAL SUPPORT (OUTPATIENT)
Dept: REHABILITATION | Facility: HOSPITAL | Age: 78
End: 2018-10-02
Payer: MEDICARE

## 2018-10-02 DIAGNOSIS — G89.29 CHRONIC PAIN OF LEFT KNEE: ICD-10-CM

## 2018-10-02 DIAGNOSIS — M25.662 DECREASED ROM OF LEFT KNEE: ICD-10-CM

## 2018-10-02 DIAGNOSIS — R29.898 DECREASED STRENGTH OF LOWER EXTREMITY: ICD-10-CM

## 2018-10-02 DIAGNOSIS — R26.89 IMPAIRED GAIT AND MOBILITY: ICD-10-CM

## 2018-10-02 DIAGNOSIS — M25.562 CHRONIC PAIN OF LEFT KNEE: ICD-10-CM

## 2018-10-02 PROCEDURE — G8978 MOBILITY CURRENT STATUS: HCPCS | Mod: CL,PN

## 2018-10-02 PROCEDURE — 97161 PT EVAL LOW COMPLEX 20 MIN: CPT | Mod: PN

## 2018-10-02 PROCEDURE — G8979 MOBILITY GOAL STATUS: HCPCS | Mod: CJ,PN

## 2018-10-02 PROCEDURE — 97110 THERAPEUTIC EXERCISES: CPT | Mod: PN

## 2018-10-02 NOTE — ANESTHESIA POST-OP PAIN MANAGEMENT
Acute Pain Service Progress Note    Aga Rodriges is a 78 y.o., female, [id number].    Surgery:  ARTHROPLASTY, KNEE, SIGHT ASSISTED (Left Knee)     Post Op Day #: 3    Catheter type: N/A    Infusion type: N/A    Problem List:    Active Hospital Problems    Diagnosis  POA    Osteoarthritis of left knee [M17.12]  Yes      Resolved Hospital Problems   No resolved problems to display.       Subjective:      Unable reach patient despite calling home and cell phone number. Patient received single shot  adductor canal, I PACK and lateral femoral cutaneous block. Will not attempt to contact patient again as patient may not accurately recall pain rating.       Meds   No current facility-administered medications for this encounter.      Current Outpatient Medications   Medication Sig Dispense Refill    acetaminophen (TYLENOL) 650 MG TbSR Take 650 mg by mouth every 8 (eight) hours.      amLODIPine (NORVASC) 10 MG tablet Take 10 mg by mouth once daily.      ATIVAN 1 mg tablet 1 mg as needed.       atorvastatin (LIPITOR) 10 MG tablet 10 mg once daily.       cloNIDine 0.2 mg/24 hr td ptwk (CATAPRES) 0.2 mg/24 hr Place onto the skin once a week.       COZAAR 100 mg tablet 100 mg 2 (two) times daily.       ergocalciferol (VITAMIN D2) 50,000 unit Cap Take 50,000 Units by mouth every 7 days.      omeprazole (PRILOSEC) 20 MG capsule 20 mg once daily.       timolol maleate 0.5% (TIMOPTIC) 0.5 % Drop 1 drop every evening.      aspirin (ECOTRIN) 81 MG EC tablet Take 1 tablet (81 mg total) by mouth 2 (two) times daily. 84 tablet 0    cephALEXin (KEFLEX) 500 MG capsule Take 1 capsule (500 mg total) by mouth 4 (four) times daily. for 4 days 16 capsule 0    docusate sodium (COLACE) 50 MG capsule Take 1 capsule (50 mg total) by mouth every 6 (six) hours. 168 capsule 0    famotidine (PEPCID) 20 MG tablet Take 1 tablet (20 mg total) by mouth 2 (two) times daily. 84 tablet 0    oxyCODONE-acetaminophen (PERCOCET) 5-325 mg per  tablet Take 1 tablet by mouth every 6 (six) hours as needed for Pain. 28 tablet 0     Rupesh Benites

## 2018-10-02 NOTE — PLAN OF CARE
[facility]  Physical Therapy Initial Evaluation    Name: Aga Rodriges  Clinic Number: [id number]    Therapy Diagnosis:   Encounter Diagnoses   Name Primary?    Chronic pain of left knee     Decreased ROM of left knee     Decreased strength of lower extremity     Impaired gait and mobility      Physician: Damon Lancaster MD    Physician Orders: PT Eval and Treat   Medical Diagnosis from Referral: primary osteoarthritis of (L) knee  Evaluation Date: 10/2/2018  Authorization Period Expiration: 12/31/18  Plan of Care Expiration: 11/16/18  Visit # / Visits authorized: 1/ 18    Time In: 3:15 pm   Time Out: 4:09 pm   Total Billable Time: 44 minutes ( 1 IE, 1 TE)     Precautions: Standard and HTN, WBAT on (L) LE    Subjective     Date of onset: DOS: 10/1/18    History of current condition - Aga reports: she had a left TKA on 10/1/18       Past Medical History:   Diagnosis Date    Acid reflux     Hypertension      Aga Rodriges  has a past surgical history that includes Hysterectomy; Carpal tunnel release (Left); ARTHROPLASTY, KNEE, SIGHT ASSISTED (Left, 10/1/2018); and CRYOTHERAPY, NERVE, PERIPHERAL, PERCUTANEOUS, USING LIQUID NITROUS OXIDE IN CLOSED NEEDLE DEVICE (Left, 9/27/2018).    Aga has a current medication list which includes the following prescription(s): acetaminophen, amlodipine, aspirin, ativan, atorvastatin, cephalexin, clonidine 0.2 mg/24 hr td ptwk, cozaar, docusate sodium, ergocalciferol, famotidine, omeprazole, oxycodone-acetaminophen, and timolol maleate 0.5%.    Review of patient's allergies indicates:  No Known Allergies     Imaging, NA    Prior Therapy: yes   Social History: Pt lives with a friend; Pt's daughter is staying with her for a week and then next week another friend is coming to help her  Occupation: Pt stated that she is retired   Prior Level of Function: independent   Current Level of Function: RW     Pain:  Current 5/10, worst 5/10,  "best 5/10   Location: left knee   Description: Aching  Aggravating Factors: Standing, Walking, Extension, Flexing and Getting out of bed/chair  Easing Factors: pain medication, ice, rest and elevation    Pts goals: to be able to walk and bend knee    Objective         AROM:   left: 8-93 degrees    PROM:   left: 5-100 degrees    L/E Strength w/ MicroFET Muscle Angelina Dynamometer Right Left Pain/Dysfunction with Movement   (approx 4 sec hold w/ max contraction)   Hip Flexion 10.4 kg  8.1 kg     Hip Abduction 9.9 kg  11.7 kg     Quadriceps 9.8 kg  4.5 kg     Hamstrings 10.3 kg  8.0 kg       TU seconds (w/ RW)    30 second sit-to-stand test (without U/E support): 0 (12 w/ UE support)    KOOS Knee Survey:    Symptoms:   Pain:    Functional, Daily Livin/68 (75% disabled)  Function, Sports and Recreational Activities:    Quality of Life:      PROMIS-29:    Physical Function:    Anxiety:    Depression:     Fatigue:     Sleep Disturbance:     Satisfaction with Social Role:     Pain Interference:     Pain Intensity:  9/10        CMS Impairment/Limitation/Restriction for KOOS, Functional, Daily Living    Therapist reviewed KOOS scores for Aga Aguilar Rodriges on 10/2/2018.   KOOS documents entered into vendome 1699 - see Media section.    Limitation Score: 75%  Category: Mobility    Current : CL = least 60% but < 80% impaired, limited or restricted  Goal: CJ = at least 20% but < 40% impaired, limited or restricted        Limitation for FOTO Knee Survey  Limitation Score: 86%         TREATMENT     Treatment Time In: 3:30 pm   Treatment Time Out: 3:48 pm   Total Treatment time separate from Evaluation: 18 minutes    Aga received therapeutic exercises to develop strength, endurance, ROM and flexibility for 18minutes including:    Date  10/2/18   VISIT 1   Gcode 1/10   FOTO    Cap Visit   Cap Total 113.20   MT    Long Sit HS    Gastroc Str.    Bike    QS 5"x20    SAQ "    SLR 2x10 L   SL Abd 2x10 L    Heel Slides    Blanco Hip Add    LAQs 2x10 L    Seated Hip Flex 2x10 B   Cybex   Leg Press    TKE    Hip Abd    Hip Flex    Hip Ext    HS Curls    Knee Ext    Step Ups    Step Downs    Gait    CP    Initials CK         Aga received the following manual therapy techniques: NT     Aga received cold pack for 10 minutes to (L) knee.    Home Exercises and Patient Education Provided    Education provided:   - yes see media section     Written Home Exercises Provided: yes.  Exercises were reviewed and Aga was able to demonstrate them prior to the end of the session.  Aga demonstrated good  understanding of the education provided.         Assessment     Aga is a 78 y.o. female s/p (L) TKA on 10/1/18. Pt presents to PT with pain, decreased left knee ROM, decreased strength, poor posture, impaired balance and gait, and functional deficits with walking. Pt would benefit from skilled PT consisting of manual therapy including STM/MFR left  knee, patellar mobs, knee flex/ext mobs/stretching; therapeutic exercise including LE strengthening/stretching, postural education, balance and gait training, and modalities prn to address limitations and increase functional mobility.      Pt prognosis is Good.   Pt will benefit from skilled outpatient Physical Therapy to address the deficits stated above and in the chart below, provide pt/family education, and to maximize pt's level of independence.     Plan of care discussed with patient: Yes  Pt's spiritual, cultural and educational needs considered and patient is agreeable to the plan of care and goals as stated below:     Anticipated Barriers for therapy: comorbidities    Medical Necessity is demonstrated by the following  History  Co-morbidities and personal factors that may impact the plan of care Co-morbidities:   HTN    Personal Factors:   no deficits     moderate   Examination  Body Structures and Functions, activity limitations and  participation restrictions that may impact the plan of care Body Regions:   lower extremities    Body Systems:    ROM  strength  gait  transfers    Participation Restrictions:   None mentioned    Activity limitations:   Learning and applying knowledge  no deficits    General Tasks and Commands  no deficits    Communication  no deficits    Mobility  walking    Self care  no deficits    Domestic Life  doing house work (cleaning house, washing dishes, laundry)    Interactions/Relationships  no deficits    Life Areas  no deficits    Community and Social Life  no deficits         high   Clinical Presentation stable and uncomplicated low   Decision Making/ Complexity Score: low     Goals:  Short Term Goals:    1. Pt will be independent with HEP  2. Pt will improve (L) LE strength to at least 75% of (R) LE strength as measured via MicroFet handheld dynamometer in order to improve functional mobility  3. Pt will improve (L) knee AROM to at least 3-110 degrees in order to improve gait    Long Term Goals:  1. Pt will be independent with updated HEP  2. Pt will improve (L) LE strength to at least 90% of (R) LE strength as measured via MicroFet handheld dynamometer in order to improve functional mobility  3. Pt will improve (L) knee AROM to at least 0-115 degrees in order to improve gait and ability to perform ADLs  4. Pt will improve FOTO knee survey score to </= 50% limited in order to demo improved functional mobility  5. Pt will improve score on Function,Daily Living section of KOOS to at least 24/68 ( 35% limited) in order to demo improved functional mobility  6. Pt will perform TUG in < 10 seconds without AD in order to demo improved gait speed  7. Pt will perform at least 14 sit to stands without UE support on 30 second sit to stand test in order to demo improved ability to perform transfers      Plan     Plan of care Certification: 10/2/2018 to 11/16/18    Outpatient Physical Therapy 3 times weekly for 6 weeks to  include the following interventions: Gait Training, Manual Therapy, Moist Heat/ Ice, Neuromuscular Re-ed, Orthotic Management and Training, Patient Education, Therapeutic Activites and Therapeutic Exercise.     Elisabeth Sheikh, PT, DPT

## 2018-10-02 NOTE — DISCHARGE SUMMARY
[facility] Discharge Summary   Patient ID:  Aga Rodriges  [id number]  78 y.o.  1940    Admit date: 10/1/2018    Discharge date and time: 10/1/2018     Admitting Physician: Damon Lancaster MD     Discharge Physician: Damon Lancaster MD    Admission Diagnoses: Primary osteoarthritis of left knee [M17.12]  Osteoarthritis of left knee, unspecified osteoarthritis type [M17.12]     Final Diagnoses:    Principal Problem: Primary osteoarthritis of left knee [M17.12]  Osteoarthritis of left knee, unspecified osteoarthritis type [M17.12]    Secondary Diagnoses: n/a    Admission Condition: good    Discharged Condition: stable    Consults: None    Operative Procedure: Left total knee arthroplasty    Indications for procedure: This is a 78 y.o. female with Primary osteoarthritis of left knee [M17.12]  Osteoarthritis of left knee, unspecified osteoarthritis type [M17.12] that was refractory to non-surgical management. After discussion of risks benefits and alternatives, they elected to proceed with the above procedure.     Hospital Course: Pt was admitted to same day surgery on 10/1/2018 for the above procedure. Pt underwent procedure, tolerated it well and without complication. Pt was brought to PACU and subsequently stepped down back to same day surgery. In same day, pt's pain was adequately controlled with PO pain medicine and pt met all criteria and was deemed stable for discharge. Pt was discharged to home with PO pain medicine, thorough wound care instructions, and appropriate L-ortho clinic follow up.     Physical Exam:  General: NAD, A&Ox3  Cardiac: RRR by PP  Pulm: Non-labored WOB  Abd: soft, non-tender non-distented  LLE: Sterile surgical dressings C/D/I  NVID    Disposition: Home or Self Care     Aga Rodriges   Home Medication Instructions NERY:[id number]    Printed on:10/01/18 2059   Medication Information                      acetaminophen (TYLENOL) 650 MG TbSR  Take 650 mg by mouth  every 8 (eight) hours.             amLODIPine (NORVASC) 10 MG tablet  Take 10 mg by mouth once daily.             aspirin (ECOTRIN) 81 MG EC tablet  Take 1 tablet (81 mg total) by mouth 2 (two) times daily.             ATIVAN 1 mg tablet  1 mg as needed.              atorvastatin (LIPITOR) 10 MG tablet  10 mg once daily.              cephALEXin (KEFLEX) 500 MG capsule  Take 1 capsule (500 mg total) by mouth 4 (four) times daily. for 4 days             cloNIDine 0.2 mg/24 hr td ptwk (CATAPRES) 0.2 mg/24 hr  Place onto the skin once a week.              COZAAR 100 mg tablet  100 mg 2 (two) times daily.              docusate sodium (COLACE) 50 MG capsule  Take 1 capsule (50 mg total) by mouth every 6 (six) hours.             ergocalciferol (VITAMIN D2) 50,000 unit Cap  Take 50,000 Units by mouth every 7 days.             famotidine (PEPCID) 20 MG tablet  Take 1 tablet (20 mg total) by mouth 2 (two) times daily.             omeprazole (PRILOSEC) 20 MG capsule  20 mg once daily.              oxyCODONE-acetaminophen (PERCOCET) 5-325 mg per tablet  Take 1 tablet by mouth every 6 (six) hours as needed for Pain.             timolol maleate 0.5% (TIMOPTIC) 0.5 % Drop  1 drop every evening.                  Activity: activity as tolerated  Diet: regular diet  Wound Care: keep wound clean and dry and as directed  Follow-up with surgery in two weeks    Signed:  Lars Ashley MD  [facility]  10/1/2018   8:59 PM

## 2018-10-03 ENCOUNTER — HOSPITAL ENCOUNTER (EMERGENCY)
Facility: HOSPITAL | Age: 78
Discharge: HOME | End: 2018-10-03
Attending: EMERGENCY MEDICINE
Payer: MEDICARE

## 2018-10-03 ENCOUNTER — DOCUMENTATION ONLY (OUTPATIENT)
Dept: REHABILITATION | Facility: HOSPITAL | Age: 78
End: 2018-10-03

## 2018-10-03 VITALS
BODY MASS INDEX: 26.31 KG/M2 | OXYGEN SATURATION: 100 % | WEIGHT: 143 LBS | DIASTOLIC BLOOD PRESSURE: 76 MMHG | HEIGHT: 62 IN | SYSTOLIC BLOOD PRESSURE: 168 MMHG | TEMPERATURE: 97 F | HEART RATE: 65 BPM | RESPIRATION RATE: 16 BRPM

## 2018-10-03 DIAGNOSIS — R11.0 NAUSEA: Primary | ICD-10-CM

## 2018-10-03 LAB
ALBUMIN SERPL BCP-MCNC: 3.5 G/DL
ALP SERPL-CCNC: 87 U/L
ALT SERPL W/O P-5'-P-CCNC: 13 U/L
ANION GAP SERPL CALC-SCNC: 12 MMOL/L
AST SERPL-CCNC: 21 U/L
BACTERIA #/AREA URNS HPF: ABNORMAL /HPF
BASOPHILS # BLD AUTO: 0.02 K/UL
BASOPHILS NFR BLD: 0.2 %
BILIRUB SERPL-MCNC: 0.3 MG/DL
BILIRUB UR QL STRIP: NEGATIVE
BUN SERPL-MCNC: 18 MG/DL
CALCIUM SERPL-MCNC: 9.6 MG/DL
CHLORIDE SERPL-SCNC: 99 MMOL/L
CLARITY UR: CLEAR
CO2 SERPL-SCNC: 23 MMOL/L
COLOR UR: YELLOW
CREAT SERPL-MCNC: 0.9 MG/DL
DIFFERENTIAL METHOD: ABNORMAL
EOSINOPHIL # BLD AUTO: 0 K/UL
EOSINOPHIL NFR BLD: 0.4 %
ERYTHROCYTE [DISTWIDTH] IN BLOOD BY AUTOMATED COUNT: 14 %
EST. GFR  (AFRICAN AMERICAN): >60 ML/MIN/1.73 M^2
EST. GFR  (NON AFRICAN AMERICAN): >60 ML/MIN/1.73 M^2
FLUAV AG SPEC QL IA: NEGATIVE
FLUBV AG SPEC QL IA: NEGATIVE
GLUCOSE SERPL-MCNC: 127 MG/DL
GLUCOSE UR QL STRIP: NEGATIVE
HCT VFR BLD AUTO: 27.8 %
HGB BLD-MCNC: 8.9 G/DL
HGB UR QL STRIP: NEGATIVE
KETONES UR QL STRIP: NEGATIVE
LEUKOCYTE ESTERASE UR QL STRIP: ABNORMAL
LYMPHOCYTES # BLD AUTO: 1.4 K/UL
LYMPHOCYTES NFR BLD: 12.5 %
MCH RBC QN AUTO: 27.3 PG
MCHC RBC AUTO-ENTMCNC: 32 G/DL
MCV RBC AUTO: 85 FL
MICROSCOPIC COMMENT: ABNORMAL
MONOCYTES # BLD AUTO: 1.2 K/UL
MONOCYTES NFR BLD: 10.2 %
NEUTROPHILS # BLD AUTO: 8.6 K/UL
NEUTROPHILS NFR BLD: 76.4 %
NITRITE UR QL STRIP: NEGATIVE
NON-SQ EPI CELLS #/AREA URNS HPF: 1 /HPF
PH UR STRIP: 6 [PH] (ref 5–8)
PLATELET # BLD AUTO: 194 K/UL
PMV BLD AUTO: 10.9 FL
POTASSIUM SERPL-SCNC: 4.5 MMOL/L
PROT SERPL-MCNC: 6.8 G/DL
PROT UR QL STRIP: NEGATIVE
RBC # BLD AUTO: 3.26 M/UL
RBC #/AREA URNS HPF: 1 /HPF (ref 0–4)
SODIUM SERPL-SCNC: 134 MMOL/L
SP GR UR STRIP: 1.01 (ref 1–1.03)
SPECIMEN SOURCE: NORMAL
SQUAMOUS #/AREA URNS HPF: 2 /HPF
URN SPEC COLLECT METH UR: ABNORMAL
UROBILINOGEN UR STRIP-ACNC: NEGATIVE EU/DL
WBC # BLD AUTO: 11.27 K/UL
WBC #/AREA URNS HPF: 6 /HPF (ref 0–5)

## 2018-10-03 PROCEDURE — 81000 URINALYSIS NONAUTO W/SCOPE: CPT

## 2018-10-03 PROCEDURE — 87400 INFLUENZA A/B EACH AG IA: CPT

## 2018-10-03 PROCEDURE — 80053 COMPREHEN METABOLIC PANEL: CPT

## 2018-10-03 PROCEDURE — 63600175 PHARM REV CODE 636 W HCPCS: Performed by: EMERGENCY MEDICINE

## 2018-10-03 PROCEDURE — 85025 COMPLETE CBC W/AUTO DIFF WBC: CPT

## 2018-10-03 PROCEDURE — 99284 EMERGENCY DEPT VISIT MOD MDM: CPT | Mod: 25

## 2018-10-03 PROCEDURE — 96374 THER/PROPH/DIAG INJ IV PUSH: CPT

## 2018-10-03 RX ORDER — ONDANSETRON 2 MG/ML
4 INJECTION INTRAMUSCULAR; INTRAVENOUS
Status: COMPLETED | OUTPATIENT
Start: 2018-10-03 | End: 2018-10-03

## 2018-10-03 RX ORDER — ONDANSETRON 4 MG/1
4 TABLET, FILM COATED ORAL EVERY 6 HOURS PRN
Qty: 12 TABLET | Refills: 0 | Status: SHIPPED | OUTPATIENT
Start: 2018-10-03 | End: 2023-02-08 | Stop reason: ALTCHOICE

## 2018-10-03 RX ADMIN — ONDANSETRON HYDROCHLORIDE 4 MG: 2 SOLUTION INTRAMUSCULAR; INTRAVENOUS at 12:10

## 2018-10-03 NOTE — PROGRESS NOTES
Pt's daughter called this morning and informed PT that pt had been experiencing increased pain in (L) knee since last night and had a low grade fever last night. Pt's daughter stated that she called ER phone line and was told these symptoms were nothing to be worried about. Pt's daughter asked if she should bring her to therapy session today. PT instructed pt's daughter for pt to come to therapy session today so that PT could inspect pt's (L) knee and see if pt would be able to tolerate therapy session.     Pt arrived to therapy session with her daughter. Pt c/o experiencing shaking and light headedness. Pt reported that she has been voiding a lot since yesterday, but has not drank a lot. Pt reported that she ate a little bit this morning and did take her BP medicine this morning. PT instructed pt to call Dr. Lancaster's office to notify about her symptoms. Pt's daughter called Dr. Lancaster's office and scheduled an apt for tomorrow morning. PT inspected pt's (L) LE and no redness noted along incision on (L) knee and no redness/discoloration noted along (L) calf region. Pt drank some water and ate a peach cup and stated that she felt a little better, but was still feeling light headed and like she was going to vomit. Pt's vitals were taken by Jose Lugo PTA blood pressure ( (L) UE, brachial, seated) was 152/78 mmHg, HR 74 bpm, and O2 sat 99%. Jose Lugo PTA performed Skin Turgor Test for dehydration on dorsal aspect of (L) hand and medial (L) forearm and pt tested positive for dehydration. Recommended that pt go to the ER due to symptoms above and possible dehydration. Pt and pt's daughter verbalized understanding. Pt's daughter stated that she was going to take pt to ER. Pt was transported out of clinic to her car via wheelchair.

## 2018-10-03 NOTE — PROGRESS NOTES
Patient not answering phone on POD 1 and POD 2 regarding pain follow up. Will attempt again tomorrow

## 2018-10-03 NOTE — ED TRIAGE NOTES
"Patient states she had L knee surgery Monday; started therapy yesterday; went to go to therapy toady and she was just feeling really bad; States " I just feel weak, nauseated, and dizzy; I dont know if I maybe over did it at therapy yesterday or what; Im just feeling bad." Patient also states her leg is bothering her since doing therapy yesterday. Dr. Lancaster did sx.  "

## 2018-10-03 NOTE — ED PROVIDER NOTES
Encounter Date: 10/3/2018    SCRIBE #1 NOTE: I, Montse Capps, am scribing for, and in the presence of,  Dr. De Guzman. I have scribed the entire note.       History     Chief Complaint   Patient presents with    Nausea     nausea, chills since last night.  Patient had left knee replacement on Monday and Dr. Streeter is not in office today.  Was sent to ED by therapist.  Took Percocetand ativan this morning and feels sleepy and loopy, but has severe knee pain.     Aga Rodriges is a 78 y.o. female who  has a past medical history of Acid reflux and Hypertension.    The patient presents to the ED due to generalized weakness. She reports onset of symptoms was this morning. The patient states she woke from sleep feeling bad. She notes associated nausea and chills but denies any fever, vomiting, diarrhea, abdominal pain, chest pain or shortness of breath. The patient does note she has been urinating more but has not been drinking much liquid. She reports she had left knee replacement 2 days ago with Dr. Lancaster. The patient is taking Percocet and Ativan. However, she notes she has been having left knee pain despite medication use      The history is provided by the patient.     Review of patient's allergies indicates:  No Known Allergies  Past Medical History:   Diagnosis Date    Acid reflux     Hypertension      Past Surgical History:   Procedure Laterality Date    ARTHROPLASTY, KNEE, SIGHT ASSISTED Left 10/1/2018    Performed by Damon Lancaster MD at [facility] OR    CARPAL TUNNEL RELEASE Left     CRYOTHERAPY, NERVE, PERIPHERAL, PERCUTANEOUS, USING LIQUID NITROUS OXIDE IN CLOSED NEEDLE DEVICE Left 9/27/2018    Performed by KANA Sullivan at [facility] OR    HYSTERECTOMY      PERCUTANEOUS CRYOTHERAPY OF PERIPHERAL NERVE USING LIQUID NITROUS OXIDE IN CLOSED NEEDLE DEVICE Left 9/27/2018    Procedure: CRYOTHERAPY, NERVE, PERIPHERAL, PERCUTANEOUS, USING LIQUID NITROUS OXIDE IN CLOSED NEEDLE DEVICE;  Surgeon: Javon HERNANDEZ  KANA Bhatt;  [facility];  Service: Orthopedics;  Laterality: Left;     Family History   Problem Relation Age of Onset    Other Mother         enlarged heart    Cancer Sister     Emphysema Brother      Social History     Tobacco Use    Smoking status: Never Smoker    Smokeless tobacco: Never Used   Substance Use Topics    Alcohol use: Yes     Frequency: 2-3 times a week     Drinks per session: 1 or 2     Binge frequency: Never     Comment: socially    Drug use: No     Review of Systems   Constitutional: Negative for chills and fever.   HENT: Negative for congestion, rhinorrhea and sore throat.    Eyes: Negative for redness and visual disturbance.   Respiratory: Negative for cough, shortness of breath and wheezing.    Cardiovascular: Negative for chest pain and palpitations.   Gastrointestinal: Positive for nausea. Negative for abdominal pain, diarrhea and vomiting.   Genitourinary: Positive for frequency. Negative for dysuria and hematuria.   Musculoskeletal: Negative for back pain, myalgias and neck pain.        Left knee pain   Skin: Negative for rash.   Neurological: Positive for weakness (generalized). Negative for dizziness and light-headedness.   Psychiatric/Behavioral: Negative for confusion.       Physical Exam     Initial Vitals [10/03/18 1054]   BP Pulse Resp Temp SpO2   (!) 173/75 72 18 98.5 °F (36.9 °C) 98 %      MAP       --         Physical Exam    Nursing note and vitals reviewed.  Constitutional: She appears well-developed and well-nourished. She is not diaphoretic. No distress.   HENT:   Head: Normocephalic and atraumatic.   Mouth/Throat: Oropharynx is clear and moist.   Eyes: Conjunctivae and EOM are normal.   Neck: Normal range of motion. Neck supple.   Cardiovascular: Normal rate, regular rhythm and normal heart sounds. Exam reveals no gallop and no friction rub.    No murmur heard.  Pulmonary/Chest: Breath sounds normal. She has no wheezes. She has no rhonchi. She has no rales.    Abdominal: Soft. There is no tenderness. There is no rebound and no guarding.   Abdomen is soft. There is no rebound or guarding. Normal bowel sounds in all four quadrants. Negative Espinosa's sign. Negative McBurney's point tenderness Negative heel tap. No masses, fluid wave or other abnormality noted. No rebound or guarding tenderness. No ecchymosis or other skin changes appreciated on physical exam.      Musculoskeletal: Normal range of motion.   Bandage to left knee. Does not appear to be infected. No redness or crepitus. C/D/I wound. No signs of purulent drainage.    Lymphadenopathy:     She has no cervical adenopathy.   Neurological: She is alert and oriented to person, place, and time. She has normal strength.   CN II-XII grossly in tact  5/5 strength throughout  Sensation WNL    Skin: Skin is warm and dry. No rash noted.         ED Course   Procedures  Labs Reviewed   CBC W/ AUTO DIFFERENTIAL - Abnormal; Notable for the following components:       Result Value    RBC 3.26 (*)     Hemoglobin 8.9 (*)     Hematocrit 27.8 (*)     Gran # (ANC) 8.6 (*)     Mono # 1.2 (*)     Gran% 76.4 (*)     Lymph% 12.5 (*)     All other components within normal limits   COMPREHENSIVE METABOLIC PANEL - Abnormal; Notable for the following components:    Sodium 134 (*)     Glucose 127 (*)     All other components within normal limits   URINALYSIS, REFLEX TO URINE CULTURE - Abnormal; Notable for the following components:    Leukocytes, UA 1+ (*)     All other components within normal limits    Narrative:     Preferred Collection Type->Urine, Clean Catch   URINALYSIS MICROSCOPIC - Abnormal; Notable for the following components:    WBC, UA 6 (*)     Non-Squam Epith 1 (*)     All other components within normal limits    Narrative:     Preferred Collection Type->Urine, Clean Catch   INFLUENZA A AND B ANTIGEN          Imaging Results          X-Ray Knee 1 or 2 View Left (Final result)  Result time 10/03/18 13:45:35    Final result by  "Shaquille Harper DO (10/03/18 13:45:35)                 Impression:      Please see above      Electronically signed by: Shaquille Harper DO  Date:    10/03/2018  Time:    13:45             Narrative:    EXAMINATION:  XR KNEE 1 OR 2 VIEW LEFT    CLINICAL HISTORY:  s/p total knee replacement sx;    TECHNIQUE:  AP and lateral views left knee    COMPARISON:  10/01/2018    FINDINGS:  Evolving operative change from recent total left knee arthroplasty continued though reduced soft tissue gas.  There is no evidence for acute fracture or dislocation.  Further evaluation as warranted clinically..                               X-Ray Chest 1 View (Final result)  Result time 10/03/18 12:43:59    Final result by Shaquille Harper DO (10/03/18 12:43:59)                 Impression:      Please see above      Electronically signed by: Shaquille Harper DO  Date:    10/03/2018  Time:    12:43             Narrative:    EXAMINATION:  XR CHEST 1 VIEW    CLINICAL HISTORY:  Nausea    TECHNIQUE:  Single frontal view of the chest was performed.    COMPARISON:  09/06/2018    FINDINGS:  No significant change from prior.  No new lung opacity.  No large pleural effusion or pneumothorax.  Continued atherosclerotic aorta.  Clinical correlation and follow-up advised                                 Medical Decision Making:   Clinical Tests:   Lab Tests: Ordered and Reviewed  Radiological Study: Ordered and Reviewed  ED Management:  - CBC w/diff notable for Hb of 8.9; no leukocytosis  - CMP WNL  - UA WNL  - CXR without acute cardiopulmonary process  - Xray L knee without acute process per radiology read; hardware in place; no SOI on physical exam; no crepitus  - 1L NS IVF bolus administered  - Zofran 4mg IV x 1 administered  - Pt reports feeling "much better"  - Will give pt rx for Zofran ODT  - Pt instructed to follow up with orthopedic sx in 24-48 hours  - Results of all emergency department tests  discussed thoroughly with patient; all patient questions " answered  - Pt instructed to follow up with PCP in one week for recheck of today's complaints  - Pt given strict emergency department return precautions for any new or worsening of symptoms  - Pt discharged from the emergency department in stable condition                         Clinical Impression:     1. Nausea        I, Miller De Guzman,  personally performed the services described in this documentation. All medical record entries made by the scribe were at my direction and in my presence.  I have reviewed the chart and agree that the record reflects my personal performance and is accurate and complete. Miller De Guzman M.D. 6:54 PM10/03/2018                   Miller De Guzman MD  10/03/18 5728

## 2018-10-03 NOTE — ADDENDUM NOTE
Addendum  created 10/03/18 1626 by Rupesh Benites MD    Pend clinical note, Sign clinical note

## 2018-10-04 ENCOUNTER — CLINICAL SUPPORT (OUTPATIENT)
Dept: REHABILITATION | Facility: HOSPITAL | Age: 78
End: 2018-10-04
Payer: MEDICARE

## 2018-10-04 DIAGNOSIS — R26.89 IMPAIRED GAIT AND MOBILITY: ICD-10-CM

## 2018-10-04 DIAGNOSIS — M25.562 CHRONIC PAIN OF LEFT KNEE: ICD-10-CM

## 2018-10-04 DIAGNOSIS — G89.29 CHRONIC PAIN OF LEFT KNEE: ICD-10-CM

## 2018-10-04 DIAGNOSIS — R29.898 DECREASED STRENGTH OF LOWER EXTREMITY: ICD-10-CM

## 2018-10-04 DIAGNOSIS — M25.662 DECREASED ROM OF LEFT KNEE: ICD-10-CM

## 2018-10-04 PROCEDURE — 97140 MANUAL THERAPY 1/> REGIONS: CPT | Mod: PN

## 2018-10-04 PROCEDURE — 97110 THERAPEUTIC EXERCISES: CPT | Mod: PN

## 2018-10-04 NOTE — PROGRESS NOTES
"DAILY TREATMENT NOTE    DATE: 10/4/2018    Start Time:  1105  Stop Time:  1210    PROCEDURES:    TIMED  Procedure Min.   Therex 30   Therex supervised 10   Manual therapy 10             UNTIMED  Procedure Min.   Cold pack 10         Total Timed Minutes:  40  Total Timed Units:  3  Total Untimed Units:  0  Charges Billed/# of units:  3 MTx1, TEx2      Progress/Current Status    Subjective:     Patient ID: Aga Rodriges is a 78 y.o. female.  Diagnosis:   1. Chronic pain of left knee     2. Decreased ROM of left knee     3. Decreased strength of lower extremity     4. Impaired gait and mobility       Pain: 9/10  States took pain meds before coming in to therapy. Reports "knee pain almost 10/10 pain - but feeling much better than on Tuesday."      Objective:     Patient instructed in and performed all therex as per log with 1:1 by PTA x 30 minutes, 10 minutes with supervision. Min assist required for SLR and SAQ.  Manual therapy provided with Left LE elevated including edema reduction massage, STM/MFR distal quad and ITB as well as posterior knee, hamstring insertion area and gastroc muscle belly.  Cold pack to anterior/posterior left knee with elevation on wedge x 10 minutes.     Date  10/04/18 10/2/18   VISIT 2 1   Gcode 2/10 1/10   FOTO 2/5 1/5   Cap Visit   Cap Total   88.10  201.30 113.20   MT      Long Sit HS      Gastroc Str.      Bike      QS 5"x20 5"x20    SAQ 2x10 B Coral L     SLR 2x10 L Min 2x10 L   SL Abd 2x10 L 2x10 L    Heel Slides      Blanco Hip Add      LAQs 2x10 B 2x10 L    Seated Hip Flex 2x10 B 2x10 B   Cybex   Leg Press -     TKE --     Hip Abd --     Hip Flex --     Hip Ext --     HS Curls --     Knee Ext --     Step Ups --     Step Downs --     Gait --     CP 10'     Initials DH 1/6 CK         Assessment:     Patient with slow deliberate movements and required several short breaks and increased time for all transfers, and Coral as noted with therex.  Reports pain decreased after treatment, " "rates "8/10"    Patient Education/Response:     Patient educated to continue HEP.  Verbalized understanding.    Plans and Goals:     Continue PT per POC and protocol, progreass as able     1. Pt will be independent with HEP  2. Pt will improve (L) LE strength to at least 75% of (R) LE strength as measured via MicroFet handheld dynamometer in order to improve functional mobility  3. Pt will improve (L) knee AROM to at least 3-110 degrees in order to improve gait     Long Term Goals:  1. Pt will be independent with updated HEP  2. Pt will improve (L) LE strength to at least 90% of (R) LE strength as measured via MicroFet handheld dynamometer in order to improve functional mobility  3. Pt will improve (L) knee AROM to at least 0-115 degrees in order to improve gait and ability to perform ADLs  4. Pt will improve FOTO knee survey score to </= 50% limited in order to demo improved functional mobility  5. Pt will improve score on Function,Daily Living section of KOOS to at least 24/68 ( 35% limited) in order to demo improved functional mobility  6. Pt will perform TUG in < 10 seconds without AD in order to demo improved gait speed  7. Pt will perform at least 14 sit to stands without UE support on 30 second sit to stand test in order to demo improved ability to perform transfers  "

## 2018-10-04 NOTE — ADDENDUM NOTE
Addendum  created 10/04/18 1146 by Rupesh Benites MD    Intraprocedure Event edited, Sign clinical note

## 2018-10-05 ENCOUNTER — CLINICAL SUPPORT (OUTPATIENT)
Dept: REHABILITATION | Facility: HOSPITAL | Age: 78
End: 2018-10-05
Payer: MEDICARE

## 2018-10-05 DIAGNOSIS — R29.898 DECREASED STRENGTH OF LOWER EXTREMITY: ICD-10-CM

## 2018-10-05 DIAGNOSIS — R26.89 IMPAIRED GAIT AND MOBILITY: ICD-10-CM

## 2018-10-05 DIAGNOSIS — M25.562 CHRONIC PAIN OF LEFT KNEE: ICD-10-CM

## 2018-10-05 DIAGNOSIS — G89.29 CHRONIC PAIN OF LEFT KNEE: ICD-10-CM

## 2018-10-05 DIAGNOSIS — M25.662 DECREASED ROM OF LEFT KNEE: ICD-10-CM

## 2018-10-05 PROCEDURE — 97140 MANUAL THERAPY 1/> REGIONS: CPT | Mod: PN

## 2018-10-05 PROCEDURE — 97110 THERAPEUTIC EXERCISES: CPT | Mod: PN

## 2018-10-05 NOTE — PROGRESS NOTES
"DAILY TREATMENT NOTE    DATE: 10/5/2018    Start Time:  11:05 am   Stop Time:  12:10 pm     PROCEDURES:    TIMED  Procedure Min.   TE supervised 25' NC   TE 10'    MT 20'              UNTIMED  Procedure Min.   CP 10'          Total Timed Minutes:  30'   Total Timed Units:  2  Total Untimed Units: 1  Charges Billed/# of units:  2 ( 1 TE, 1 MT)       Progress/Current Status    Subjective:     Patient ID: Aga Rodriges is a 78 y.o. female.  Diagnosis:   1. Chronic pain of left knee     2. Decreased ROM of left knee     3. Decreased strength of lower extremity     4. Impaired gait and mobility       Pain: 5 /10  Pt reported feeling better today.     Objective:     Patient instructed in and performed all therex as per log supervised x 25' and 1:1 with PT x10'.  Manual therapy provided with Left LE elevated including edema reduction massage, STM/MFR distal quad and ITB as well as posterior knee, hamstring insertion area and gastroc muscle belly and removal of bandage from (L) knee.  Cold pack to anterior/posterior left knee with elevation on wedge x 10 minutes.     Date  10/5/18 10/04/18 10/2/18   VISIT 3 2 1   Gcode 3/10 2/10 1/10   FOTO 3/5 2/5 1/5   Cap Visit   Cap Total 57.78  259.08   88.10  201.30 113.20   MT 20'       Long Sit HS Seated with foot propped 4'      Gastroc Str. W/strap 3x30" seated      Bike       QS 5"x20 5"x20 5"x20    SAQ 2x10 L 2x10 B Jesenia L     SLR 2x10 Min A 2x10 L Min 2x10 L   SL Abd 2x10 L 2x10 L 2x10 L    Heel Slides       Blanco Hip Add       LAQs 2x10 B 2x10 B 2x10 L    Seated Hip Flex 2x10 B 2x10 B 2x10 B   Cybex   Leg Press  -     TKE  --     Hip Abd  --     Hip Flex  --     Hip Ext  --     HS Curls  --     Knee Ext  --     Step Ups  --     Step Downs  --     Gait  --     CP 10'  10'     Initials CK DH 1/6 CK       Assessment:     Pt tolerated therapy session without any adverse reactions. Bandage removed from incision on (L) knee and no sign of infection noted.     Patient " Education/Response:     Continue with HEP    Plans and Goals:     Continue PT per POC and protocol, progreass as able     1. Pt will be independent with HEP  2. Pt will improve (L) LE strength to at least 75% of (R) LE strength as measured via MicroFet handheld dynamometer in order to improve functional mobility  3. Pt will improve (L) knee AROM to at least 3-110 degrees in order to improve gait     Long Term Goals:  1. Pt will be independent with updated HEP  2. Pt will improve (L) LE strength to at least 90% of (R) LE strength as measured via MicroFet handheld dynamometer in order to improve functional mobility  3. Pt will improve (L) knee AROM to at least 0-115 degrees in order to improve gait and ability to perform ADLs  4. Pt will improve FOTO knee survey score to </= 50% limited in order to demo improved functional mobility  5. Pt will improve score on Function,Daily Living section of KOOS to at least 24/68 ( 35% limited) in order to demo improved functional mobility  6. Pt will perform TUG in < 10 seconds without AD in order to demo improved gait speed  7. Pt will perform at least 14 sit to stands without UE support on 30 second sit to stand test in order to demo improved ability to perform transfers

## 2018-10-08 ENCOUNTER — CLINICAL SUPPORT (OUTPATIENT)
Dept: REHABILITATION | Facility: HOSPITAL | Age: 78
End: 2018-10-08
Payer: MEDICARE

## 2018-10-08 DIAGNOSIS — R29.898 DECREASED STRENGTH OF LOWER EXTREMITY: ICD-10-CM

## 2018-10-08 DIAGNOSIS — G89.29 CHRONIC PAIN OF LEFT KNEE: ICD-10-CM

## 2018-10-08 DIAGNOSIS — R26.89 IMPAIRED GAIT AND MOBILITY: ICD-10-CM

## 2018-10-08 DIAGNOSIS — M25.662 DECREASED ROM OF LEFT KNEE: ICD-10-CM

## 2018-10-08 DIAGNOSIS — M25.562 CHRONIC PAIN OF LEFT KNEE: ICD-10-CM

## 2018-10-08 PROCEDURE — 97110 THERAPEUTIC EXERCISES: CPT | Mod: PN

## 2018-10-08 PROCEDURE — 97140 MANUAL THERAPY 1/> REGIONS: CPT | Mod: PN

## 2018-10-08 NOTE — PROGRESS NOTES
"DAILY TREATMENT NOTE    DATE: 10/8/2018    Start Time:  1000  Stop Time:  1110    PROCEDURES:    TIMED  Procedure Min.   TE 50   MT 10                 UNTIMED  Procedure Min.   CP 10         Total Timed Minutes:  50  Total Timed Units:  3  Total Untimed Units:  0  Charges Billed/# of units:  3 TE, 1MT      Progress/Current Status    Subjective:     Patient ID: Aga Rodriges is a 78 y.o. female.  Diagnosis:   1. Chronic pain of left knee     2. Decreased ROM of left knee     3. Decreased strength of lower extremity     4. Impaired gait and mobility       Pain: 5 /10  Pt stated that she was hurting today but she has been managing pain and swelling with ice and elevation and she has taken her pain meds this am.     Objective:     PT initiated with MT consisting of retrograde edema reduction, grade III patellar mobs in all directions f/b STM/MFR to the L IT band, proximal gastroc and distal HS.  PT performed objective measures x 15' f/b TE per log with PT x 35' total.    Date  10/8/18 10/5/18 10/04/18 10/2/18   VISIT 4 3 2 1   Gcode 4/10  3/10 2/10 1/10   FOTO 4/5 NEXT 3/5 2/5 1/5   Cap Visit   Cap Total 118.42  377.50 57.78  259.08   88.10  201.30 113.20   MT 10' 20'       Long Sit HS NT HEP Seated with foot propped 4'      Gastroc Str. W/strap 3x30" seated W/strap 3x30" seated      Bike 5'       QS 5'' x 20  5"x20 5"x20 5"x20    SAQ 2x12 L 2x10 L 2x10 B Coral L     SLR 2 x 12 B 2x10 Min A 2x10 L Min 2x10 L   SL Abd 2 x 10 L 2x10 L 2x10 L 2x10 L    Heel Slides 10 x 10'' L       Blanco Hip Add --       LAQs 2x10 B Coral with L 2x10 B 2x10 B 2x10 L    Seated Hip Flex 2x10 B 2x10 B 2x10 B 2x10 B   Cybex   Leg Press ---  -     TKE NEXT!!!  --     Hip Abd --  --     Hip Flex --  --     Hip Ext --  --     HS Curls --  --     Knee Ext --  --     Step Ups -  --     Step Downs --  --     Gait NEXT!!!  --     CP 10' 10'  10'     Initials FS CK DH 1/6 CK     AROM:   left: 2-90 degrees     PROM:   left: 0-94 degrees     L/E " Strength w/ MicroFET Muscle Angelina Dynamometer Right Left Pain/Dysfunction with Movement   (approx 4 sec hold w/ max contraction)   Hip Flexion 16.5 kg  5.9 kg      Hip Abduction 12.0 kg  13.8 kg      Quadriceps 20.0 kg  2.5 kg      Hamstrings 13.6 kg  6.8 kg         TU seconds (w/ RW)     30 second sit-to-stand test (without U/E support): 0 (6 w/ UE support)       Assessment:     Pt was in much more pain today than initial eval so her objective measures have declined but pt ROM still in optimal range for week 2 and she was able to progress to bike on today.  Pt would benefit from gait training to progress to SPC and TKEs for progressive quad strengthening.      Patient Education/Response:     CONT HEP and add heel slides with and without strap    Plans and Goals:     If pt not able to do LAQ or SAQ next session without assist PT will consider estim for quad NMES.    Continue PT per POC and protocol, progreass as able     1. Pt will be independent with HEP  2. Pt will improve (L) LE strength to at least 75% of (R) LE strength as measured via MicroFet handheld dynamometer in order to improve functional mobility  3. Pt will improve (L) knee AROM to at least 3-110 degrees in order to improve gait     Long Term Goals:  1. Pt will be independent with updated HEP  2. Pt will improve (L) LE strength to at least 90% of (R) LE strength as measured via MicroFet handheld dynamometer in order to improve functional mobility  3. Pt will improve (L) knee AROM to at least 0-115 degrees in order to improve gait and ability to perform ADLs  4. Pt will improve FOTO knee survey score to </= 50% limited in order to demo improved functional mobility  5. Pt will improve score on Function,Daily Living section of KOOS to at least 24/68 ( 35% limited) in order to demo improved functional mobility  6. Pt will perform TUG in < 10 seconds without AD in order to demo improved gait speed  7. Pt will perform at least 14 sit to stands without  UE support on 30 second sit to stand test in order to demo improved ability to perform transfers

## 2018-10-09 ENCOUNTER — CLINICAL SUPPORT (OUTPATIENT)
Dept: REHABILITATION | Facility: HOSPITAL | Age: 78
End: 2018-10-09
Payer: MEDICARE

## 2018-10-09 DIAGNOSIS — M25.562 CHRONIC PAIN OF LEFT KNEE: ICD-10-CM

## 2018-10-09 DIAGNOSIS — G89.29 CHRONIC PAIN OF LEFT KNEE: ICD-10-CM

## 2018-10-09 PROCEDURE — 97140 MANUAL THERAPY 1/> REGIONS: CPT | Mod: PN

## 2018-10-09 PROCEDURE — 97110 THERAPEUTIC EXERCISES: CPT | Mod: PN

## 2018-10-09 NOTE — PROGRESS NOTES
"  Physical Therapy Daily Treatment Note     Name: Aga Aguilar [facility] Number: [id number]    Therapy Diagnosis:   Encounter Diagnosis   Name Primary?    Chronic pain of left knee      Physician: Damon Lancaster MD    Visit Date: 10/9/2018    Start Time:  10:05  Stop Time:  11:10     PROCEDURES:     TIMED  Procedure Min.   TE 45'   MT 10'                                                                UNTIMED  Procedure Min.   CP 10'          Total Timed Minutes:  55  Total Timed Units:  4  Total Untimed Units:  1  Charges Billed/# of units:  3TE, 1MT    Subjective     Pt reports: she is in more pain today then usual, "my knee has been aching all morning"  She was compliant with home exercise program.  Response to previous treatment:: Pt states she could not do anything for the rest of the day and was very sore   Functional change: no change    Pain: 8/10  Location: left knee      Objective   PT initiated with MT for 10' consisting of retrograde edema reduction, patellar mobs in all directions STM/MFR to the L IT band, proximal gastroc and distal HS.    Pt rode bike for 5' prior to there-ex with cueing from PTA for full revolutions.    Aga received therapeutic exercises to develop strength, endurance, ROM and flexibility for 40 minutes including:  See log below    Date  10/9/18 10/8/18 10/5/18 10/04/18 10/2/18   VISIT 5 4 3 2 1   Gcode 5/10 4/10  3/10 2/10 1/10   FOTO 5/5 DONE 4/5 NEXT 3/5 2/5 1/5   Cap Visit   Cap Total  118.42  377.50 57.78  259.08   88.10  201.30 113.20   MT 10' 10' 20'        Long Sit HS NT NT HEP Seated with foot propped 4'       Gastroc Str. 3x30" w strap W/strap 3x30" seated W/strap 3x30" seated       Bike 5' 5'         QS 5" x 20 5'' x 20  5"x20 5"x20 5"x20    SAQ 2x12 L 2x12 L 2x10 L 2x10 B Coral L     SLR 2x12 L 2 x 12 B 2x10 Min A 2x10 L Min 2x10 L   SL Abd 2x10 L 2 x 10 L 2x10 L 2x10 L 2x10 L    Heel Slides 10 x 10" 10 x 10'' L         Blanco Hip Add  --         LAQs x10 L 2x10 B Coral " with L 2x10 B 2x10 B 2x10 L    Seated Hip Flex NT 2x10 B 2x10 B 2x10 B 2x10 B   Cybex   Leg Press  ---   -     TKE 2x10  NEXT!!!   --     Hip Abd  --   --     Hip Flex  --   --     Hip Ext  --   --     HS Curls  --   --     Knee Ext  --   --     Step Ups  -   --     Step Downs  --   --     Gait x6 laps by ballet bars with SC NEXT!!!   --     CP 10' 10' 10'  10'     Initials AC 1/6 FS CK DH 1/6 CK     Pt received CP for 10'    Home Exercises Provided and Patient Education Provided     Education provided:   - Continue HEP    Written Home Exercises Provided: Patient instructed to cont prior HEP.  Exercises were reviewed and Aga was able to demonstrate them prior to the end of the session.  Aga demonstrated good  understanding of the education provided.     See EMR under Patient Instructions for exercises provided prior visit.    Assessment     Pt able to make full revolutions on bike, and able to complete SAQ and LAQ w/o assist today. Pt practiced ambulation with SC and demonstrated good technique, should be able to progress from RW to SC soon, which pt was happy about.   Aga is progressing well towards her goals.   Pt prognosis is Excellent.     Pt will continue to benefit from skilled outpatient physical therapy to address the deficits listed in the problem list box on initial evaluation, provide pt/family education and to maximize pt's level of independence in the home and community environment.     Pt's spiritual, cultural and educational needs considered and pt agreeable to plan of care and goals.    Anticipated barriers to physical therapy: none    1. Pt will be independent with HEP  2. Pt will improve (L) LE strength to at least 75% of (R) LE strength as measured via Qonfet handheld dynamometer in order to improve functional mobility  3. Pt will improve (L) knee AROM to at least 3-110 degrees in order to improve gait     Long Term Goals:  1. Pt will be independent with updated HEP  2. Pt will  improve (L) LE strength to at least 90% of (R) LE strength as measured via MicroFet handheld dynamometer in order to improve functional mobility  3. Pt will improve (L) knee AROM to at least 0-115 degrees in order to improve gait and ability to perform ADLs  4. Pt will improve FOTO knee survey score to </= 50% limited in order to demo improved functional mobility  5. Pt will improve score on Function,Daily Living section of KOOS to at least 24/68 ( 35% limited) in order to demo improved functional mobility  6. Pt will perform TUG in < 10 seconds without AD in order to demo improved gait speed  7. Pt will perform at least 14 sit to stands without UE support on 30 second sit to stand test in order to demo improved ability to perform transfers       Plan   Continue POC. Progress as tolerated.    Vanessa Vila, PTA

## 2018-10-11 ENCOUNTER — CLINICAL SUPPORT (OUTPATIENT)
Dept: REHABILITATION | Facility: HOSPITAL | Age: 78
End: 2018-10-11
Payer: MEDICARE

## 2018-10-11 DIAGNOSIS — M25.562 CHRONIC PAIN OF LEFT KNEE: ICD-10-CM

## 2018-10-11 DIAGNOSIS — R26.89 IMPAIRED GAIT AND MOBILITY: ICD-10-CM

## 2018-10-11 DIAGNOSIS — M25.662 DECREASED ROM OF LEFT KNEE: ICD-10-CM

## 2018-10-11 DIAGNOSIS — R29.898 DECREASED STRENGTH OF LOWER EXTREMITY: ICD-10-CM

## 2018-10-11 DIAGNOSIS — G89.29 CHRONIC PAIN OF LEFT KNEE: ICD-10-CM

## 2018-10-11 PROCEDURE — 97140 MANUAL THERAPY 1/> REGIONS: CPT | Mod: PN

## 2018-10-11 PROCEDURE — 97110 THERAPEUTIC EXERCISES: CPT | Mod: PN

## 2018-10-11 NOTE — PROGRESS NOTES
"  Physical Therapy Daily Treatment Note     Name: Aga Aguilar [facility] Number: [id number]    Therapy Diagnosis:   Encounter Diagnoses   Name Primary?    Chronic pain of left knee     Decreased ROM of left knee     Decreased strength of lower extremity     Impaired gait and mobility      Physician: Damon Lancaster MD    Visit Date: 10/11/2018    Start Time:  10:00 AM  Stop Time:  11:05 AM     PROCEDURES:     TIMED  Procedure Min.   TE 45'   MT 10'                                                                UNTIMED  Procedure Min.   CP 10'          Total Timed Minutes:  55  Total Timed Units:  4  Total Untimed Units:  1  Charges Billed/# of units:  3TE, 1MT    Subjective     Pt reports: uncomfortable ache. " I wake up a lot because of the pain in my knee"  She was compliant with home exercise program.  Response to previous treatment:: Pt states she could not do anything for the rest of the day and was very sore   Functional change: no change    Pain: 8/10  Location: anterior left knee      Objective   PT initiated with Manual Therpay for 10' consisting of retrograde edema reduction, patellar mobs in all directions STM/MFR to the L IT band, proximal gastroc and distal HS.    Pt performed stationary bike for 5' prior to there-ex with PTA verbal instruction on full revolutions.    Aga received therapeutic exercises to develop strength, endurance, ROM and flexibility for 40 minutes including:  See log below    Date  10/11/18 10/9/18 10/8/18 10/5/18 10/04/18 10/2/18   VISIT 6 5 4 3 2 1   Gcode 6/10 5/10 4/10  3/10 2/10 1/10   FOTO 6/10 5/5 DONE 4/5 NEXT 3/5 2/5 1/5   Cap Visit   Cap Total 118.42  614.34  118.42  377.50 57.78  259.08   88.10  201.30 113.20   MT 10 min  10' 10' 20'        Long Sit HS  NT NT HEP Seated with foot propped 4'       Gastroc Str. 30"x3 w/ strap  3x30" w strap W/strap 3x30" seated W/strap 3x30" seated       Bike 5 min  5' 5'         QS 5"x20 5" x 20 5'' x 20  5"x20 5"x20 5"x20    SAQ  " "2x12 L 2x12 L 2x10 L 2x10 B Coral L     SLR 2x12 L 2x12 L 2 x 12 B 2x10 Min A 2x10 L Min 2x10 L   SL Abd  2x10 L 2 x 10 L 2x10 L 2x10 L 2x10 L    Heel Slides 10"x10 L 10 x 10" 10 x 10'' L         Blanco Hip Add -  --         LAQs NT x10 L 2x10 B Coral with L 2x10 B 2x10 B 2x10 L    Seated Hip Flex NT NT 2x10 B 2x10 B 2x10 B 2x10 B   Cybex   Leg Press 5.5 3x10 B  ---   -     TKE oot 2x10  NEXT!!!   --     Hip Abd -  --   --     Hip Flex -  --   --     Hip Ext --  --   --     HS Curls -  --   --     Knee Ext -  --   --     Step Ups -  -   --     Step Downs -  --   --     Gait SPC indoors 180 ft,  45 ft SBA x6 laps by ballet bars with SC NEXT!!!   --     CP 10 min  10' 10' 10'  10'     Initials JA 2/6 AC 1/6 FS CK DH 1/6 CK     Pt received CP for 10'    Home Exercises Provided and Patient Education Provided     Education provided:   - Continue HEP    Written Home Exercises Provided: Patient instructed to cont prior HEP.  Exercises were reviewed and Aga was able to demonstrate them prior to the end of the session.  Aga demonstrated good  understanding of the education provided.     See EMR under Patient Instructions for exercises provided prior visit.    Assessment     Pt able to complete all therex with no reports of pain. Pt demonstrating no LOB with use of SPC indoors on level surfaces  Aga is progressing well towards her goals.   Pt prognosis is Excellent.     Pt will continue to benefit from skilled outpatient physical therapy to address the deficits listed in the problem list box on initial evaluation, provide pt/family education and to maximize pt's level of independence in the home and community environment.     Pt's spiritual, cultural and educational needs considered and pt agreeable to plan of care and goals.    Anticipated barriers to physical therapy: none    1. Pt will be independent with HEP  2. Pt will improve (L) LE strength to at least 75% of (R) LE strength as measured via MicroFet handheld " dynamometer in order to improve functional mobility  3. Pt will improve (L) knee AROM to at least 3-110 degrees in order to improve gait     Long Term Goals:  1. Pt will be independent with updated HEP  2. Pt will improve (L) LE strength to at least 90% of (R) LE strength as measured via MicroFet handheld dynamometer in order to improve functional mobility  3. Pt will improve (L) knee AROM to at least 0-115 degrees in order to improve gait and ability to perform ADLs  4. Pt will improve FOTO knee survey score to </= 50% limited in order to demo improved functional mobility  5. Pt will improve score on Function,Daily Living section of KOOS to at least 24/68 ( 35% limited) in order to demo improved functional mobility  6. Pt will perform TUG in < 10 seconds without AD in order to demo improved gait speed  7. Pt will perform at least 14 sit to stands without UE support on 30 second sit to stand test in order to demo improved ability to perform transfers       Plan   Continue POC. Progress as tolerated.    Jose Lugo, PTA

## 2018-10-12 ENCOUNTER — CLINICAL SUPPORT (OUTPATIENT)
Dept: REHABILITATION | Facility: HOSPITAL | Age: 78
End: 2018-10-12
Payer: MEDICARE

## 2018-10-12 DIAGNOSIS — G89.29 CHRONIC PAIN OF LEFT KNEE: ICD-10-CM

## 2018-10-12 DIAGNOSIS — M25.562 CHRONIC PAIN OF LEFT KNEE: ICD-10-CM

## 2018-10-12 DIAGNOSIS — M25.662 DECREASED ROM OF LEFT KNEE: ICD-10-CM

## 2018-10-12 DIAGNOSIS — R29.898 DECREASED STRENGTH OF LOWER EXTREMITY: ICD-10-CM

## 2018-10-12 DIAGNOSIS — R26.89 IMPAIRED GAIT AND MOBILITY: ICD-10-CM

## 2018-10-12 PROCEDURE — 97116 GAIT TRAINING THERAPY: CPT | Mod: PN

## 2018-10-12 PROCEDURE — 97140 MANUAL THERAPY 1/> REGIONS: CPT | Mod: PN

## 2018-10-12 PROCEDURE — 97110 THERAPEUTIC EXERCISES: CPT | Mod: PN

## 2018-10-12 NOTE — PROGRESS NOTES
"DAILY TREATMENT NOTE    DATE: 10/12/2018    Start Time:  11:00 am   Stop Time:  12:11 pm     PROCEDURES:    TIMED  Procedure Min.   TE supervised 5' NC   TE 36'    MT 10'    GT 10'         UNTIMED  Procedure Min.   CP 10'          Total Timed Minutes:  56'   Total Timed Units:  4  Total Untimed Units:  0  Charges Billed/# of units:  4 ( 2 TE, 1 MT, 1 GT)       Progress/Current Status    Subjective:     Patient ID: Aga Rodriges is a 78 y.o. female.  Diagnosis:   1. Chronic pain of left knee     2. Decreased ROM of left knee     3. Decreased strength of lower extremity     4. Impaired gait and mobility       Pain: 5 /10      Objective:     Pt initiated therapy session on stationary bike x 5' supervised. Pt received Manual Therpay for 10' consisting of retrograde edema reduction, STM/MFR to the L IT band, quad, proximal gastroc and distal HS.    Aga received therapeutic exercises to develop strength, endurance, ROM and flexibility for 36 minutes including: log below. Pt participated in gait training with SPC in clinic and outside of clinic practicing ascending/descending curbs and ambulating on even and uneven surfaces x 10'. CP to (L) knee with (L) LE elevated at the end of therapy session.       Date  10/12/18 10/11/18 10/9/18 10/8/18 10/5/18 10/04/18 10/2/18   VISIT 7 6 5 4 3 2 1   Gcode 7/10 6/10 5/10 4/10  3/10 2/10 1/10   FOTO 7/10 6/10 5/5 DONE 4/5 NEXT 3/5 2/5 1/5   Cap Visit   Cap Total 118.08  732.76 118.42  614.34  118.42  377.50 57.78  259.08   88.10  201.30 113.20   MT 10'' 10 min  10' 10' 20'        Long Sit HS 3x30" stairs standing  NT NT HEP Seated with foot propped 4'       Gastroc Str. 30"x3 w/strap 30"x3 w/ strap  3x30" w strap W/strap 3x30" seated W/strap 3x30" seated       Bike 5' full revs 5 min  5' 5'         QS 5"x30  5"x20 5" x 20 5'' x 20  5"x20 5"x20 5"x20    SAQ -  2x12 L 2x12 L 2x10 L 2x10 B Jesenia L     SLR 2x15 L  2x12 L 2x12 L 2 x 12 B 2x10 Min A 2x10 L Min 2x10 L   SL Abd " "2x15 L   2x10 L 2 x 10 L 2x10 L 2x10 L 2x10 L    Heel Slides 10"x10 L  10"x10 L 10 x 10" 10 x 10'' L         Blanco Hip Add  -  --         LAQs 2x15 L NT x10 L 2x10 B Coral with L 2x10 B 2x10 B 2x10 L    Seated Hip Flex - NT NT 2x10 B 2x10 B 2x10 B 2x10 B   Cybex   Leg Press 5.5 2x15 DL 5.5 3x10 B  ---   -     TKE PTC 2x10 3" hold oot 2x10  NEXT!!!   --     Hip Abd 2x10 B // -  --   --     Hip Flex 2x10 B // -  --   --     Hip Ext 2x10 B // --  --   --     HS Curls  -  --   --     Knee Ext  -  --   --     Step Ups  -  -   --     Step Downs  -  --   --     Gait SPC in clinic and outside see note SPC indoors 180 ft,  45 ft SBA x6 laps by ballet bars with SC NEXT!!!   --     CP 10'  10 min  10' 10' 10'  10'     Initials CK JA 2/6 AC 1/6 FS CK DH 1/6 CK       Assessment:     Pt was able to tolerate all therex noted per log above including progression of therex without any adverse reactions. Pt practiced ambulating with SPC in clinic and outside of clinic ascending/descending curbs and walking on even and uneven ground without any LOB episodes.     Patient Education/Response:     Continue with HEP     Plans and Goals:   Continue per POC, progress as tolerated    Short Term Goals:   1. Pt will be independent with HEP  2. Pt will improve (L) LE strength to at least 75% of (R) LE strength as measured via MicroFet handheld dynamometer in order to improve functional mobility  3. Pt will improve (L) knee AROM to at least 3-110 degrees in order to improve gait     Long Term Goals:  1. Pt will be independent with updated HEP  2. Pt will improve (L) LE strength to at least 90% of (R) LE strength as measured via MicroFet handheld dynamometer in order to improve functional mobility  3. Pt will improve (L) knee AROM to at least 0-115 degrees in order to improve gait and ability to perform ADLs  4. Pt will improve FOTO knee survey score to </= 50% limited in order to demo improved functional mobility  5. Pt will improve score on " Function,Daily Living section of KOOS to at least 24/68 ( 35% limited) in order to demo improved functional mobility  6. Pt will perform TUG in < 10 seconds without AD in order to demo improved gait speed  7. Pt will perform at least 14 sit to stands without UE support on 30 second sit to stand test in order to demo improved ability to perform transfers

## 2018-10-15 ENCOUNTER — CLINICAL SUPPORT (OUTPATIENT)
Dept: REHABILITATION | Facility: HOSPITAL | Age: 78
End: 2018-10-15
Payer: MEDICARE

## 2018-10-15 DIAGNOSIS — R26.89 IMPAIRED GAIT AND MOBILITY: ICD-10-CM

## 2018-10-15 DIAGNOSIS — M25.562 CHRONIC PAIN OF LEFT KNEE: ICD-10-CM

## 2018-10-15 DIAGNOSIS — R29.898 DECREASED STRENGTH OF LOWER EXTREMITY: ICD-10-CM

## 2018-10-15 DIAGNOSIS — G89.29 CHRONIC PAIN OF LEFT KNEE: ICD-10-CM

## 2018-10-15 DIAGNOSIS — M25.662 DECREASED ROM OF LEFT KNEE: ICD-10-CM

## 2018-10-15 PROCEDURE — 97014 ELECTRIC STIMULATION THERAPY: CPT | Mod: PN

## 2018-10-15 PROCEDURE — 97110 THERAPEUTIC EXERCISES: CPT | Mod: PN

## 2018-10-15 NOTE — PROGRESS NOTES
"DAILY TREATMENT NOTE    DATE: 10/15/2018    Start Time:  11:00  am  Stop Time:  11: 22 am    PROCEDURES:    TIMED  Procedure Min.   TE supervised 42' NC   TE 30'                  UNTIMED  Procedure Min.   CP 10'    Estim  10' w/therex      Total Timed Minutes:  30'  Total Timed Units:  2  Total Untimed Units:  1  Charges Billed/# of units:  3 (2 TE, 1 estim)       Progress/Current Status    Subjective:     Patient ID: Aga Rodriges is a 78 y.o. female.  Diagnosis:   1. Chronic pain of left knee     2. Decreased ROM of left knee     3. Decreased strength of lower extremity     4. Impaired gait and mobility       Pain: 2 /10  Pt stated that she has just been experiencing discomfort in (L) knee.     Objective:     Objective measurements were taken and pt participated in therex per log below 1:1 with PT including Russian Estim to (L) quad during quads sets and saq and supervised therex x 42'. CP to (L) knee with (L) LE elevated at the end of therapy session x10'.       Date  10/15/18 10/12/18 10/11/18 10/9/18 10/8/18 10/5/18 10/04/18 10/2/18   VISIT 8 7 6 5 4 3 2 1   Gcode 8/10 7/10 6/10 5/10 4/10  3/10 2/10 1/10   FOTO 8/10 7/10 6/10 5/5 DONE 4/5 NEXT 3/5 2/5 1/5   Cap Visit   Cap Total 75.25  808.01 118.08  732.76 118.42  614.34  118.42  377.50 57.78  259.08   88.10  201.30 113.20   MT next 10'' 10 min  10' 10' 20'        Long Sit HS  3x30" stairs standing  NT NT HEP Seated with foot propped 4'       Gastroc Str.  30"x3 w/strap 30"x3 w/ strap  3x30" w strap W/strap 3x30" seated W/strap 3x30" seated       Bike 5'  5' full revs 5 min  5' 5'         QS 5' w/estim 5"x30  5"x20 5" x 20 5'' x 20  5"x20 5"x20 5"x20    SAQ 5' w/estim -  2x12 L 2x12 L 2x10 L 2x10 B Jesenia L     SLR 2x15 L 2x15 L  2x12 L 2x12 L 2 x 12 B 2x10 Min A 2x10 L Min 2x10 L   SL Abd  2x15 L   2x10 L 2 x 10 L 2x10 L 2x10 L 2x10 L    Heel Slides 10"x10 L 10"x10 L  10"x10 L 10 x 10" 10 x 10'' L         Blanco Hip Add   -  --         LAQs  2x15 L NT " "x10 L 2x10 B Coral with L 2x10 B 2x10 B 2x10 L    Seated Hip Flex  - NT NT 2x10 B 2x10 B 2x10 B 2x10 B   Cybex   Leg Press 5.5 2x15 DL 5.5 2x15 DL 5.5 3x10 B  ---   -     TKE  PTC 2x10 3" hold oot 2x10  NEXT!!!   --     Hip Abd 2x10 B//  2x10 B // -  --   --     Hip Flex 2x10 B // 2x10 B // -  --   --     Hip Ext 2x10 B//  2x10 B // --  --   --     HS Curls   -  --   --     Knee Ext   -  --   --     Step Ups   -  -   --     Step Downs   -  --   --     Gait  SPC in clinic and outside see note SPC indoors 180 ft,  45 ft SBA x6 laps by ballet bars with SC NEXT!!!   --     CP 10'  10'  10 min  10' 10' 10'  10'     Initials CK CK JA 2/6 AC 1/6 FS CK DH 1/6 CK       AROM:   left: 3-91 degrees     PROM:   left: 0-97 degrees     L/E Strength w/ eleniET Muscle Angelina Dynamometer Right Left Pain/Dysfunction with Movement   (approx 4 sec hold w/ max contraction)   Hip Flexion 13.6 kg  11.9 kg      Hip Abduction 14.3 kg  11.7 kg      Quadriceps 13.1 kg  6.6 kg      Hamstrings 13.5 kg  9.4 kg         TUG:  15 seconds (w/ spc)     30 second sit-to-stand test (without U/E support): 10    Assessment:     Pt demo poor (L) quad activation, therefore trial of Russian estim today to (L) quad. Pt demo significant improvement in TUG and 30 second sit to stand test compared to measurements taken last week. Pt tolerated therapy session without any adverse reactions.     Patient Education/Response:     Continue with HEP     Plans and Goals:     Continue per POC, progress as tolerated    Short Term Goals:   1. Pt will be independent with HEP  2. Pt will improve (L) LE strength to at least 75% of (R) LE strength as measured via MicroFet handheld dynamometer in order to improve functional mobility  3. Pt will improve (L) knee AROM to at least 3-110 degrees in order to improve gait     Long Term Goals:  1. Pt will be independent with updated HEP  2. Pt will improve (L) LE strength to at least 90% of (R) LE strength as measured via MicroFet " handheld dynamometer in order to improve functional mobility  3. Pt will improve (L) knee AROM to at least 0-115 degrees in order to improve gait and ability to perform ADLs  4. Pt will improve FOTO knee survey score to </= 50% limited in order to demo improved functional mobility  5. Pt will improve score on Function,Daily Living section of KOOS to at least 24/68 ( 35% limited) in order to demo improved functional mobility  6. Pt will perform TUG in < 10 seconds without AD in order to demo improved gait speed  7. Pt will perform at least 14 sit to stands without UE support on 30 second sit to stand test in order to demo improved ability to perform transfers

## 2018-10-17 ENCOUNTER — CLINICAL SUPPORT (OUTPATIENT)
Dept: REHABILITATION | Facility: HOSPITAL | Age: 78
End: 2018-10-17
Payer: MEDICARE

## 2018-10-17 DIAGNOSIS — R29.898 DECREASED STRENGTH OF LOWER EXTREMITY: ICD-10-CM

## 2018-10-17 DIAGNOSIS — M25.662 DECREASED ROM OF LEFT KNEE: ICD-10-CM

## 2018-10-17 DIAGNOSIS — M25.562 CHRONIC PAIN OF LEFT KNEE: ICD-10-CM

## 2018-10-17 DIAGNOSIS — G89.29 CHRONIC PAIN OF LEFT KNEE: ICD-10-CM

## 2018-10-17 DIAGNOSIS — R26.89 IMPAIRED GAIT AND MOBILITY: ICD-10-CM

## 2018-10-17 PROCEDURE — 97014 ELECTRIC STIMULATION THERAPY: CPT | Mod: PN

## 2018-10-17 PROCEDURE — 97110 THERAPEUTIC EXERCISES: CPT | Mod: PN

## 2018-10-17 PROCEDURE — 97140 MANUAL THERAPY 1/> REGIONS: CPT | Mod: PN

## 2018-10-17 NOTE — PROGRESS NOTES
"DAILY TREATMENT NOTE    DATE: 10/17/2018    Start Time:  10:00 am   Stop Time:  11:10 am     PROCEDURES:    TIMED  Procedure Min.   TE supervised 30' NC   TE 20'    MT 10'              UNTIMED  Procedure Min.   CP 10'    estim 10' w/therex      Total Timed Minutes:  30'  Total Timed Units:  2  Total Untimed Units:  1  Charges Billed/# of units:  3 ( 1 TE, 1 MT, 1 estim)       Progress/Current Status    Subjective:     Patient ID: Aga Rodriges is a 78 y.o. female.  Diagnosis:   1. Chronic pain of left knee     2. Decreased ROM of left knee     3. Decreased strength of lower extremity     4. Impaired gait and mobility       Pain: 4 /10 pain in (L) knee prior to therapy session.       Objective:     Pt participated in therex per log below supervised x 30' and  1:1 with PTx20' including Russian Estim to (L) quad during quads sets and SLR therex. MTx10' consisting of STM to the L IT band, quad, proximal gastroc and distal HS.  CP to (L) knee with (L) LE elevated at the end of therapy session x10'.       Date  10/17/18 10/15/18 10/12/18 10/11/18 10/9/18 10/8/18 10/5/18 10/04/18 10/2/18   VISIT 9 8 7 6 5 4 3 2 1   Gcode 9 8/10 7/10 6/10 5/10 4/10  3/10 2/10 1/10   FOTO 9/10 8/10 7/10 6/10 5/5 DONE 4/5 NEXT 3/5 2/5 1/5   Cap Visit   Cap Total 57.78  880.40 75.25  808.01 118.08  732.76 118.42  614.34  118.42  377.50 57.78  259.08   88.10  201.30 113.20   MT 10'  next 10'' 10 min  10' 10' 20'        Long Sit HS 3x30" standing  3x30" stairs standing  NT NT HEP Seated with foot propped 4'       Gastroc Str.   30"x3 w/strap 30"x3 w/ strap  3x30" w strap W/strap 3x30" seated W/strap 3x30" seated       Bike 5'  5'  5' full revs 5 min  5' 5'         QS 5' w/estim 5' w/estim 5"x30  5"x20 5" x 20 5'' x 20  5"x20 5"x20 5"x20    SAQ - 5' w/estim -  2x12 L 2x12 L 2x10 L 2x10 B Jesenia L     SLR 5' w/estim 2x15 L 2x15 L  2x12 L 2x12 L 2 x 12 B 2x10 Min A 2x10 L Min 2x10 L   SL Abd   2x15 L   2x10 L 2 x 10 L 2x10 L 2x10 L 2x10 L  " "  Heel Slides 10"x10  10"x10 L 10"x10 L  10"x10 L 10 x 10" 10 x 10'' L         Blanco Hip Add    -  --         LAQs   2x15 L NT x10 L 2x10 B Coral with L 2x10 B 2x10 B 2x10 L    Seated Hip Flex   - NT NT 2x10 B 2x10 B 2x10 B 2x10 B   Cybex   Leg Press 5.5 2x15 DL 5.5 2x15 DL 5.5 2x15 DL 5.5 3x10 B  ---   -     TKE PTC 2x10 3" hold  PTC 2x10 3" hold oot 2x10  NEXT!!!   --     Hip Abd 2x15 B // 2x10 B//  2x10 B // -  --   --     Hip Flex 2x15 B // 2x10 B // 2x10 B // -  --   --     Hip Ext 2x15 B// 2x10 B//  2x10 B // --  --   --     HS Curls    -  --   --     Knee Ext    -  --   --     Step Ups    -  -   --     Step Downs    -  --   --     Gait   SPC in clinic and outside see note SPC indoors 180 ft,  45 ft SBA x6 laps by RocketBoltet bars with SC NEXT!!!   --     CP 10'  10'  10'  10 min  10' 10' 10'  10'     Initials CK CK CK JA 2/6 AC 1/6 FS CK DH 1/6 CK       Assessment:     Pt tolerated therapy session without any c/o increased pain or adverse reactions.     Patient Education/Response:     Continue with HEP    Plans and Goals:     Continue per POC, progress as tolerated    Short Term Goals:   1. Pt will be independent with HEP  2. Pt will improve (L) LE strength to at least 75% of (R) LE strength as measured via MicroFet handheld dynamometer in order to improve functional mobility  3. Pt will improve (L) knee AROM to at least 3-110 degrees in order to improve gait     Long Term Goals:  1. Pt will be independent with updated HEP  2. Pt will improve (L) LE strength to at least 90% of (R) LE strength as measured via MicroFet handheld dynamometer in order to improve functional mobility  3. Pt will improve (L) knee AROM to at least 0-115 degrees in order to improve gait and ability to perform ADLs  4. Pt will improve FOTO knee survey score to </= 50% limited in order to demo improved functional mobility  5. Pt will improve score on Function,Daily Living section of KOOS to at least 24/68 ( 35% limited) in order to demo " improved functional mobility  6. Pt will perform TUG in < 10 seconds without AD in order to demo improved gait speed  7. Pt will perform at least 14 sit to stands without UE support on 30 second sit to stand test in order to demo improved ability to perform transfers

## 2018-10-19 ENCOUNTER — CLINICAL SUPPORT (OUTPATIENT)
Dept: REHABILITATION | Facility: HOSPITAL | Age: 78
End: 2018-10-19
Payer: MEDICARE

## 2018-10-19 DIAGNOSIS — M25.562 CHRONIC PAIN OF LEFT KNEE: ICD-10-CM

## 2018-10-19 DIAGNOSIS — R29.898 DECREASED STRENGTH OF LOWER EXTREMITY: ICD-10-CM

## 2018-10-19 DIAGNOSIS — M25.662 DECREASED ROM OF LEFT KNEE: ICD-10-CM

## 2018-10-19 DIAGNOSIS — G89.29 CHRONIC PAIN OF LEFT KNEE: ICD-10-CM

## 2018-10-19 DIAGNOSIS — R26.89 IMPAIRED GAIT AND MOBILITY: ICD-10-CM

## 2018-10-19 PROCEDURE — 97110 THERAPEUTIC EXERCISES: CPT | Mod: PN

## 2018-10-19 PROCEDURE — G8978 MOBILITY CURRENT STATUS: HCPCS | Mod: CK,PN

## 2018-10-19 PROCEDURE — G8979 MOBILITY GOAL STATUS: HCPCS | Mod: CJ,PN

## 2018-10-19 PROCEDURE — 97014 ELECTRIC STIMULATION THERAPY: CPT | Mod: PN

## 2018-10-19 NOTE — PROGRESS NOTES
"DAILY TREATMENT NOTE    DATE: 10/19/2018    Start Time:  11:00 am   Stop Time:  12:10 pm     PROCEDURES:    TIMED  Procedure Min.   TE supervised 15' NC   TE 45'                  UNTIMED  Procedure Min.   CP 10'    estim 10' w/therex     Total Timed Minutes:  45'   Total Timed Units:  3  Total Untimed Units:  1  Charges Billed/# of units:  4 ( 3 TE, 1 Estim)       Progress/Current Status    Subjective:     Patient ID: Aga Rodriges is a 78 y.o. female.  Diagnosis:   1. Chronic pain of left knee     2. Decreased ROM of left knee     3. Decreased strength of lower extremity     4. Impaired gait and mobility       Pain: 1 /10  Pt stated that he (L) knee was feeling tight prior to therapy session.     Objective:     Pt initiated therapy session on stationary bike x5' supervised. Pt participated in therex per log below 1:1 with PTx45' including Russian Estim to (L) quad during quads sets and SLR therex and supervised therex x 10'.  CP to (L) knee with (L) LE elevated at the end of therapy session x10'.       Date  10/19/18 10/17/18 10/15/18 10/12/18 10/11/18 10/9/18 10/8/18 10/5/18 10/04/18 10/2/18   VISIT 10 9 8 7 6 5 4 3 2 1   Gcode 10   9 8/10 7/10 6/10 5/10 4/10  3/10 2/10 1/10   FOTO 10 done 9/10 8/10 7/10 6/10 5/5 DONE 4/5 NEXT 3/5 2/5 1/5   Cap Visit   Cap Total 105.57  985.97 57.78  880.40 75.25  808.01 118.08  732.76 118.42  614.34  118.42  377.50 57.78  259.08   88.10  201.30 113.20   MT NT 10'  next 10'' 10 min  10' 10' 20'        Long Sit HS  3x30" standing  3x30" stairs standing  NT NT HEP Seated with foot propped 4'       Gastroc Str. 30"x3 fitter   30"x3 w/strap 30"x3 w/ strap  3x30" w strap W/strap 3x30" seated W/strap 3x30" seated       Bike 5'  5'  5'  5' full revs 5 min  5' 5'         QS 5' w/estim 5' w/estim 5' w/estim 5"x30  5"x20 5" x 20 5'' x 20  5"x20 5"x20 5"x20    SAQ  - 5' w/estim -  2x12 L 2x12 L 2x10 L 2x10 B Jesenia L     SLR 5' w/estim 5' w/estim 2x15 L 2x15 L  2x12 L 2x12 L 2 x 12 B " "2x10 Min A 2x10 L Min 2x10 L   SL Abd    2x15 L   2x10 L 2 x 10 L 2x10 L 2x10 L 2x10 L    Heel Slides 10"x10 w/straps 10"x10  10"x10 L 10"x10 L  10"x10 L 10 x 10" 10 x 10'' L         Prone knee flexion  10"x10 w/strap            Blanco Hip Add     -  --         LAQs    2x15 L NT x10 L 2x10 B Coral with L 2x10 B 2x10 B 2x10 L    Seated Hip Flex    - NT NT 2x10 B 2x10 B 2x10 B 2x10 B   Cybex   Leg Press 6.0 2x10 DL  5.5 2x15 DL 5.5 2x15 DL 5.5 2x15 DL 5.5 3x10 B  ---   -     TKE PTC 2x15 3" hold PTC 2x10 3" hold  PTC 2x10 3" hold oot 2x10  NEXT!!!   --     Hip Abd RTB 2x15 B 2x15 B // 2x10 B//  2x10 B // -  --   --     Hip Flex RTB 2x15 B 2x15 B // 2x10 B // 2x10 B // -  --   --     Hip Ext RTB 2x15 B 2x15 B// 2x10 B//  2x10 B // --  --   --     HS Curls 4.0 2x10     -  --   --     Knee Ext     -  --   --     Step Ups     -  -   --     Step Downs L1 2x10 L    -  --   --     Gait    SPC in clinic and outside see note SPC indoors 180 ft,  45 ft SBA x6 laps by ballet bars with SC NEXT!!!   --     CP 10'  10'  10'  10'  10 min  10' 10' 10'  10'     Initials CK CK CK CK JA 2/6 AC 1/6 FS CK DH 1/6 CK     KOOS Function, Daily Livin/68 ( 45.5 % limited)     Assessment:     Pt was able to tolerate all therex including additional therex noted per log above without any adverse reactions. Added additional knee flexion exercise to help promote improving (L) knee flexion. Focus on improving (L) knee flexion AROM.     Patient Education/Response:     Continue with HEP    Plans and Goals:     Continue per POC, progress as tolerated, Focus on improving (L) Quad strength and (L) knee flexion AROM    Short Term Goals:   1. Pt will be independent with HEP  2. Pt will improve (L) LE strength to at least 75% of (R) LE strength as measured via MicroFet handheld dynamometer in order to improve functional mobility  3. Pt will improve (L) knee AROM to at least 3-110 degrees in order to improve gait     Long Term Goals:  1. Pt will be " independent with updated HEP  2. Pt will improve (L) LE strength to at least 90% of (R) LE strength as measured via MicroFet handheld dynamometer in order to improve functional mobility  3. Pt will improve (L) knee AROM to at least 0-115 degrees in order to improve gait and ability to perform ADLs  4. Pt will improve FOTO knee survey score to </= 50% limited in order to demo improved functional mobility  5. Pt will improve score on Function,Daily Living section of KOOS to at least 24/68 ( 35% limited) in order to demo improved functional mobility  6. Pt will perform TUG in < 10 seconds without AD in order to demo improved gait speed  7. Pt will perform at least 14 sit to stands without UE support on 30 second sit to stand test in order to demo improved ability to perform transfers

## 2018-10-22 ENCOUNTER — CLINICAL SUPPORT (OUTPATIENT)
Dept: REHABILITATION | Facility: HOSPITAL | Age: 78
End: 2018-10-22
Payer: MEDICARE

## 2018-10-22 DIAGNOSIS — G89.29 CHRONIC PAIN OF LEFT KNEE: ICD-10-CM

## 2018-10-22 DIAGNOSIS — R26.89 IMPAIRED GAIT AND MOBILITY: ICD-10-CM

## 2018-10-22 DIAGNOSIS — R29.898 DECREASED STRENGTH OF LOWER EXTREMITY: ICD-10-CM

## 2018-10-22 DIAGNOSIS — M25.662 DECREASED ROM OF LEFT KNEE: ICD-10-CM

## 2018-10-22 DIAGNOSIS — M25.562 CHRONIC PAIN OF LEFT KNEE: ICD-10-CM

## 2018-10-22 PROCEDURE — 97110 THERAPEUTIC EXERCISES: CPT | Mod: PN

## 2018-10-22 PROCEDURE — 97032 APPL MODALITY 1+ESTIM EA 15: CPT | Mod: PN

## 2018-10-22 NOTE — PROGRESS NOTES
"DAILY TREATMENT NOTE    DATE: 10/22/2018    Start Time: 1000  Stop Time: ***    PROCEDURES:    TIMED  Procedure Min.   TE supervised ***   TE ***                 UNTIMED  Procedure Min.   CP 10   EStim 10     Total Timed Minutes:  ***  Total Timed Units:  ***  Total Untimed Units:  1  Charges Billed/# of units:  TE-***, EStim Un***attended-***      Progress/Current Status    Subjective:     Patient ID: Aga Rodriges is a 78 y.o. female.  Diagnosis:   1. Chronic pain of left knee     2. Decreased ROM of left knee     3. Decreased strength of lower extremity     4. Impaired gait and mobility       Pain: 0/10    Pt reports her knee is feeling much better however tightness persists.     Objective:     Therapeutic exercise for improved LE strength and flexibility and knee ROM for *** minutes 1:1 with PT and *** minutes supervised including:  Per log  [place] Estim to L quad during quads sets and SLR     Cold pack to L knee for 10 minutes  with L LE elevated on traction stool.    Date  10/22/18 10/19/18 10/17/18 10/15/18 10/12/18   VISIT 11 10 9 8 7   Gcode 1/10 10   9 8/10 7/10   FOTO w/gcode 10 done 9/10 8/10 7/10   Cap Visit   Cap Total *** 105.57  985.97 57.78  880.40 75.25  808.01 118.08  732.76   MT  NT 10'  next 10''   Long Sit HS 3x30" standing  3x30" standing  3x30" stairs standing   Gastroc Str. 3x30" fitter 30"x3 fitter   30"x3 w/strap   Bike 5'  5'  5'  5'  5' full revs   QS 5' w/estim 5' w/estim 5' w/estim 5' w/estim 5"x30    SAQ   - 5' w/estim -   SLR 5' w/estim 5' w/estim 5' w/estim 2x15 L 2x15 L    SL Abd     2x15 L    Heel Slides 10"x10 w/straps 10"x10 w/straps 10"x10  10"x10 L 10"x10 L    Prone knee flexion  10"x10 w/strap 10"x10 w/strap      Blanco Hip Add        LAQs     2x15 L   Seated Hip Flex     -   Cybex   Leg Press 6.0 2x10 DL   3.0 2x10 L 6.0 2x10 DL  5.5 2x15 DL 5.5 2x15 DL 5.5 2x15 DL   TKE PTC 2x15 3" hold PTC 2x15 3" hold PTC 2x10 3" hold  PTC 2x10 3" hold   Hip Abd RTB 2x15 B RTB " 2x15 B 2x15 B // 2x10 B//  2x10 B //   Hip Flex RTB 2x15 B RTB 2x15 B 2x15 B // 2x10 B // 2x10 B //   Hip Ext RTB 2x15 B RTB 2x15 B 2x15 B// 2x10 B//  2x10 B //   HS Curls 4.0 2x10  4.0 2x10       Knee Ext        Step Ups        Step Downs L1 2x10 L L1 2x10 L      Gait     SPC in clinic and outside see note   CP  10'  10'  10'  10'    Initials CC CK CK CK CK     Assessment:     Pt was able to tolerate all therex including additional therex noted per log above without any adverse reactions. Added additional knee flexion exercise to help promote improving (L) knee flexion. Focus on improving (L) knee flexion AROM.     Patient Education/Response:     Continue with HEP    Plans and Goals:     Continue per POC, progress as tolerated, Focus on improving (L) Quad strength and (L) knee flexion AROM    Short Term Goals:   1. Pt will be independent with HEP  2. Pt will improve (L) LE strength to at least 75% of (R) LE strength as measured via MicroFet handheld dynamometer in order to improve functional mobility  3. Pt will improve (L) knee AROM to at least 3-110 degrees in order to improve gait     Long Term Goals:  1. Pt will be independent with updated HEP  2. Pt will improve (L) LE strength to at least 90% of (R) LE strength as measured via MicroFet handheld dynamometer in order to improve functional mobility  3. Pt will improve (L) knee AROM to at least 0-115 degrees in order to improve gait and ability to perform ADLs  4. Pt will improve FOTO knee survey score to </= 50% limited in order to demo improved functional mobility  5. Pt will improve score on Function,Daily Living section of KOOS to at least 24/68 ( 35% limited) in order to demo improved functional mobility  6. Pt will perform TUG in < 10 seconds without AD in order to demo improved gait speed  7. Pt will perform at least 14 sit to stands without UE support on 30 second sit to stand test in order to demo improved ability to perform transfers

## 2018-10-22 NOTE — PROGRESS NOTES
"  Physical Therapy Daily Treatment Note     Name: Aga Aguilar [facility] Number: [id number]    Therapy Diagnosis:   Encounter Diagnoses   Name Primary?    Chronic pain of left knee     Decreased ROM of left knee     Decreased strength of lower extremity     Impaired gait and mobility      Physician: Damon Lancaster MD    Visit Date: 10/22/2018    Start Time:  10:00 am   Stop Time:  11:15 am     PROCEDURES:     TIMED  Procedure Min.   TE  60                                                                     UNTIMED  Procedure Min.   CP 10   Bike 5'   estim 10' w/ there-ex      Total Timed Minutes: 60  Total Timed Units:  4  Total Untimed Units:  1  Charges Billed/# of units:  TE-4, ESTIM-1    Subjective     Pt reports: being able to keep up with her HEP over the weekend and minimal pain today.   She was compliant with home exercise program.  Response to previous treatment: no adverse effects  Functional change: none    Pain: 0/10  Location: left knee      Objective   Pt initiated therapy session on stationary bike x5'.   Aga received therapeutic exercises to develop strength, endurance, ROM and flexibility for 60' including: Ethiopian Estim to (L) quad during quads sets and SLR;  ther-ex per log below     Date  10/22/18 10/19/18 10/17/18 10/15/18 10/12/18 10/11/18 10/9/18 10/8/18 10/5/18 10/04/18 10/2/18   VISIT 11 10 9 8 7 6 5 4 3 2 1   Gcode 11 10    9 8/10 7/10 6/10 5/10 4/10  3/10 2/10 1/10   FOTO 11 10 done 9/10 8/10 7/10 6/10 5/5 DONE 4/5 NEXT 3/5 2/5 1/5   Cap Visit   Cap Total 136.63  1122.6 105.57  985.97 57.78  880.40 75.25  808.01 118.08  732.76 118.42  614.34   118.42  377.50 57.78  259.08   88.10  201.30 113.20   MT NT NT 10'  next 10'' 10 min  10' 10' 20'        Long Sit HS 30"x3 w/ strap   3x30" standing   3x30" stairs standing   NT NT HEP Seated with foot propped 4'       Gastroc Str. 30"x3 on fitter 30"x3 fitter     30"x3 w/strap 30"x3 w/ strap  3x30" w strap W/strap 3x30" seated W/strap " "3x30" seated       Bike 5' 5'  5'  5'  5' full revs 5 min  5' 5'         QS 5' w/ estim 5' w/estim 5' w/estim 5' w/estim 5"x30  5"x20 5" x 20 5'' x 20  5"x20 5"x20 5"x20    SAQ    - 5' w/estim -   2x12 L 2x12 L 2x10 L 2x10 B Coral L     SLR 5' w/  estim 5' w/estim 5' w/estim 2x15 L 2x15 L  2x12 L 2x12 L 2 x 12 B 2x10 Min A 2x10 L Min 2x10 L   SL Abd        2x15 L    2x10 L 2 x 10 L 2x10 L 2x10 L 2x10 L    Heel Slides 10"x10 w/ strap 10"x10 w/straps 10"x10  10"x10 L 10"x10 L  10"x10 L 10 x 10" 10 x 10'' L         Prone knee flexion  10"x10 w/strap 10"x10 w/strap                     Blanco Hip Add          -   --         LAQs        2x15 L NT x10 L 2x10 B Coral with L 2x10 B 2x10 B 2x10 L    Seated Hip Flex        - NT NT 2x10 B 2x10 B 2x10 B 2x10 B   Cybex   Leg Press oot 6.0 2x10 DL  5.5 2x15 DL 5.5 2x15 DL 5.5 2x15 DL 5.5 3x10 B   ---   -     TKE PTC 2x15 3' HOLD PTC 2x15 3" hold PTC 2x10 3" hold   PTC 2x10 3" hold oot 2x10  NEXT!!!   --     Hip Abd RTB 2x15 RTB 2x15 B 2x15 B // 2x10 B//  2x10 B // -   --   --     Hip Flex RTB 2x15 RTB 2x15 B 2x15 B // 2x10 B // 2x10 B // -   --   --     Hip Ext RTB 2x15 RTB 2x15 B 2x15 B// 2x10 B//  2x10 B // --   --   --     HS Curls oot 4.0 2x10        -   --   --     Knee Ext          -   --   --     Step Ups          -   -   --     Step Downs oot L1 2x10 L       -   --   --     Gait        SPC in clinic and outside see note SPC indoors 180 ft,  45 ft SBA x6 laps by [employer] bars with SC NEXT!!!   --     CP 10' 10'  10'  10'  10'  10 min  10' 10' 10'  10'     Initials AC 1/6 CK CK CK CK JA 2/6 AC 1/6 FS CK DH 1/6 CK      Pt received CP to L knee for 10'    AROM:   left: 2-100 degrees     PROM:   left: 0-110 degrees     L/E Strength w/ MicroFET Muscle Angelina Dynamometer Right Left Pain/Dysfunction with Movement   (approx 4 sec hold w/ max contraction)   Hip Flexion 9.0 kg  7.3 kg      Hip Abduction 9.6 kg  8.9 kg      Quadriceps 9.9 kg  5.6 kg      Hamstrings 7.9 kg  6.9 kg    "      TU seconds (w/o spc)     30 second sit-to-stand test (without U/E support): 10    Home Exercises Provided and Patient Education Provided     Education provided:   - Cont HEP    Written Home Exercises Provided: Patient instructed to cont prior HEP.  Exercises were reviewed and Aga was able to demonstrate them prior to the end of the session.  Aga demonstrated good  understanding of the education provided.   See EMR under Patient Instructions for exercises provided prior visit.    Assessment   Pt progressing well with there-ex. Pt with improved PROM and AROM today, as well as improved TUG time and able to perform w/o SPC.   Aga is progressing well towards her goals.   Pt prognosis is Excellent.     Pt will continue to benefit from skilled outpatient physical therapy to address the deficits listed in the problem list box on initial evaluation, provide pt/family education and to maximize pt's level of independence in the home and community environment.   Pt's spiritual, cultural and educational needs considered and pt agreeable to plan of care and goals.    Anticipated barriers to physical therapy: none    Goals:   Short Term Goals:   1. Pt will be independent with HEP  2. Pt will improve (L) LE strength to at least 75% of (R) LE strength as measured via MicroFet handheld dynamometer in order to improve functional mobility  3. Pt will improve (L) knee AROM to at least 3-110 degrees in order to improve gait     Long Term Goals:  1. Pt will be independent with updated HEP  2. Pt will improve (L) LE strength to at least 90% of (R) LE strength as measured via MicroFet handheld dynamometer in order to improve functional mobility  3. Pt will improve (L) knee AROM to at least 0-115 degrees in order to improve gait and ability to perform ADLs  4. Pt will improve FOTO knee survey score to </= 50% limited in order to demo improved functional mobility  5. Pt will improve score on Function,Daily Living  section of KOOS to at least 24/68 ( 35% limited) in order to demo improved functional mobility  6. Pt will perform TUG in < 10 seconds without AD in order to demo improved gait speed  7. Pt will perform at least 14 sit to stands without UE support on 30 second sit to stand test in order to demo improved ability to perform transfers    Plan   Continue per POC, progress as tolerated, Focus on improving (L) Quad strength and (L) knee flexion AROM    Vanessa Vila, PTA

## 2018-10-23 ENCOUNTER — DOCUMENTATION ONLY (OUTPATIENT)
Dept: REHABILITATION | Facility: HOSPITAL | Age: 78
End: 2018-10-23

## 2018-10-23 NOTE — PROGRESS NOTES
Face to Face PTA Conference performed with Tasha Kathleen PTA, Jose Lugo PTA, Richi Jasso PTA, and Vanessa Vila PTA regarding patient's current status, overall progress, and plan of care.    Elisabeth Sheikh, PT     Face to face meeting completed with Elisabeth Sheikh PT regarding current status and progress of   Aga Jeff Rodriges .  Vanessa Vila PTA     Face to face meeting completed with Elisabeth Sheikh  PT regarding current status and progress of   Aga Jeff Rodriges .  Richi Jasso PTA    Face to face meeting completed with Elisabeth Sheikh  PT regarding current status and progress of   Aga Jeff Rodriges . Jose Lugo PTA     Face to face meeting completed with Elisabeth Sheikh PT regarding current status and progress of   Aga Jeff Rodriges .  Tasha Kathleen, PTA

## 2018-10-24 ENCOUNTER — CLINICAL SUPPORT (OUTPATIENT)
Dept: REHABILITATION | Facility: HOSPITAL | Age: 78
End: 2018-10-24
Payer: MEDICARE

## 2018-10-24 DIAGNOSIS — M25.662 DECREASED ROM OF LEFT KNEE: ICD-10-CM

## 2018-10-24 DIAGNOSIS — M25.562 CHRONIC PAIN OF LEFT KNEE: ICD-10-CM

## 2018-10-24 DIAGNOSIS — G89.29 CHRONIC PAIN OF LEFT KNEE: ICD-10-CM

## 2018-10-24 DIAGNOSIS — R26.89 IMPAIRED GAIT AND MOBILITY: ICD-10-CM

## 2018-10-24 DIAGNOSIS — R29.898 DECREASED STRENGTH OF LOWER EXTREMITY: ICD-10-CM

## 2018-10-24 PROCEDURE — 97110 THERAPEUTIC EXERCISES: CPT | Mod: PN

## 2018-10-24 PROCEDURE — 97140 MANUAL THERAPY 1/> REGIONS: CPT | Mod: PN

## 2018-10-24 NOTE — PROGRESS NOTES
"DAILY TREATMENT NOTE    DATE: 10/24/2018    Start Time:  10:00 am   Stop Time:  11:09 am     PROCEDURES:    TIMED  Procedure Min.   TE supervised 5' NC   TE 44'    MT 10'              UNTIMED  Procedure Min.   CP 10'          Total Timed Minutes:  54'   Total Timed Units:  4  Total Untimed Units:  1  Charges Billed/# of units:  4 ( 3 TE, 1 MT)       Progress/Current Status    Subjective:     Patient ID: Aga Rodriges is a 78 y.o. female.  Diagnosis:   1. Chronic pain of left knee     2. Decreased ROM of left knee     3. Decreased strength of lower extremity     4. Impaired gait and mobility       Pain: 1/10  Pt stated that her (L) knee was feeling stiff prior to therapy session today.     Objective:     Pt initiated therapy session on stationary bike x5'.   Aga received therapeutic exercises to develop strength, endurance, ROM and flexibility for 44' 1:1 with PT per log below. Pt received MT consisting of STM to (L) quad, HS, GS, and ITB x 10'. CP to (L) knee with (L) LE elevated at the end of session.      Date  10/24/18 10/22/18 10/19/18 10/17/18 10/15/18 10/12/18 10/11/18 10/9/18 10/8/18 10/5/18 10/04/18 10/2/18   VISIT 12 11 10 9 8 7 6 5 4 3 2 1   Gcode 2/10 11 10    9 8/10 7/10 6/10 5/10 4/10  3/10 2/10 1/10   FOTO dc 11 10 done 9/10 8/10 7/10 6/10 5/5 DONE 4/5 NEXT 3/5 2/5 1/5   Cap Visit   Cap Total 118.42  1241.02 136.63  1122.6 105.57  985.97 57.78  880.40 75.25  808.01 118.08  732.76 118.42  614.34   118.42  377.50 57.78  259.08   88.10  201.30 113.20   MT 10'  NT NT 10'  next 10'' 10 min  10' 10' 20'        Long Sit HS 30"x3 stairs 30"x3 w/ strap   3x30" standing   3x30" stairs standing   NT NT HEP Seated with foot propped 4'       Gastroc Str. 30"x3 fitter 30"x3 on fitter 30"x3 fitter     30"x3 w/strap 30"x3 w/ strap  3x30" w strap W/strap 3x30" seated W/strap 3x30" seated       Bike 5'  5' 5'  5'  5'  5' full revs 5 min  5' 5'         QS  5' w/ estim 5' w/estim 5' w/estim 5' w/estim 5"x30  " "5"x20 5" x 20 5'' x 20  5"x20 5"x20 5"x20    SAQ     - 5' w/estim -   2x12 L 2x12 L 2x10 L 2x10 B Coral L     SLR  5' w/  estim 5' w/estim 5' w/estim 2x15 L 2x15 L  2x12 L 2x12 L 2 x 12 B 2x10 Min A 2x10 L Min 2x10 L   SL Abd         2x15 L    2x10 L 2 x 10 L 2x10 L 2x10 L 2x10 L    Heel Slides 10"x10 w/strap 10"x10 w/ strap 10"x10 w/straps 10"x10  10"x10 L 10"x10 L  10"x10 L 10 x 10" 10 x 10'' L         Prone knee flexion  10"x10 w/strap 10"x10 w/strap 10"x10 w/strap                     Blanco Hip Add           -   --         LAQs         2x15 L NT x10 L 2x10 B Coral with L 2x10 B 2x10 B 2x10 L    Seated Hip Flex         - NT NT 2x10 B 2x10 B 2x10 B 2x10 B   Cybex   Leg Press 6.0 2x15 DL     oot 6.0 2x10 DL  5.5 2x15 DL 5.5 2x15 DL 5.5 2x15 DL 5.5 3x10 B   ---   -     TKE PTC 2x15 3" hold PTC 2x15 3' HOLD PTC 2x15 3" hold PTC 2x10 3" hold   PTC 2x10 3" hold oot 2x10  NEXT!!!   --     Hip Abd GTC 2x10  RTB 2x15 RTB 2x15 B 2x15 B // 2x10 B//  2x10 B // -   --   --     Hip Flex GTC 2x10  RTB 2x15 RTB 2x15 B 2x15 B // 2x10 B // 2x10 B // -   --   --     Hip Ext GTC 2x10 RTB 2x15 RTB 2x15 B 2x15 B// 2x10 B//  2x10 B // --   --   --     HS Curls 4.0 2x15 oot 4.0 2x10        -   --   --     Knee Ext 10# 2x10          -   --   --     Step Ups L1 2x10 L          -   -   --     Step Downs L1 2x10 L oot L1 2x10 L       -   --   --     Gait         SPC in clinic and outside see note SPC indoors 180 ft,  45 ft SBA x6 laps by ballet bars with SC NEXT!!!   --     CP 10'  10' 10'  10'  10'  10'  10 min  10' 10' 10'  10'     Initials CK AC 1/6 CK CK CK CK JA 2/6 AC 1/6 FS CK DH 1/6 CK       Assessment:     Pt was able to tolerate additional quad strengthening therex noted per log above without any adverse reactions. Pt tolerated therapy session without any adverse reactions. Continue to focus on improving (L) knee AROM and (L) quad strength.     Patient Education/Response:     Continue with HEP    Plans and Goals:   Continue per POC, " progress as tolerated    Goals:   Short Term Goals:   1. Pt will be independent with HEP  2. Pt will improve (L) LE strength to at least 75% of (R) LE strength as measured via MicroFet handheld dynamometer in order to improve functional mobility  3. Pt will improve (L) knee AROM to at least 3-110 degrees in order to improve gait     Long Term Goals:  1. Pt will be independent with updated HEP  2. Pt will improve (L) LE strength to at least 90% of (R) LE strength as measured via MicroFet handheld dynamometer in order to improve functional mobility  3. Pt will improve (L) knee AROM to at least 0-115 degrees in order to improve gait and ability to perform ADLs  4. Pt will improve FOTO knee survey score to </= 50% limited in order to demo improved functional mobility  5. Pt will improve score on Function,Daily Living section of KOOS to at least 24/68 ( 35% limited) in order to demo improved functional mobility  6. Pt will perform TUG in < 10 seconds without AD in order to demo improved gait speed  7. Pt will perform at least 14 sit to stands without UE support on 30 second sit to stand test in order to demo improved ability to perform transfers

## 2018-10-26 ENCOUNTER — CLINICAL SUPPORT (OUTPATIENT)
Dept: REHABILITATION | Facility: HOSPITAL | Age: 78
End: 2018-10-26
Payer: MEDICARE

## 2018-10-26 DIAGNOSIS — R29.898 DECREASED STRENGTH OF LOWER EXTREMITY: ICD-10-CM

## 2018-10-26 DIAGNOSIS — R26.89 IMPAIRED GAIT AND MOBILITY: ICD-10-CM

## 2018-10-26 DIAGNOSIS — M25.662 DECREASED ROM OF LEFT KNEE: ICD-10-CM

## 2018-10-26 DIAGNOSIS — G89.29 CHRONIC PAIN OF LEFT KNEE: ICD-10-CM

## 2018-10-26 DIAGNOSIS — M25.562 CHRONIC PAIN OF LEFT KNEE: ICD-10-CM

## 2018-10-26 PROCEDURE — 97140 MANUAL THERAPY 1/> REGIONS: CPT | Mod: PN

## 2018-10-26 PROCEDURE — 97110 THERAPEUTIC EXERCISES: CPT | Mod: PN

## 2018-10-26 NOTE — PROGRESS NOTES
"DAILY TREATMENT NOTE    DATE: 10/26/2018    Start Time: 0955  Stop Time: 1100    PROCEDURES:    TIMED  Procedure Min.   TE supervised    TE 45   MT 10              UNTIMED  Procedure Min.   CP 10   Bike 5     Total Timed Minutes: 55  Total Timed Units:  4  Total Untimed Units:  1  Charges Billed/# of units: TE-3, MT-1      Progress/Current Status    Subjective:     Patient ID: Aga Rodriges is a 78 y.o. female.  Diagnosis:   1. Chronic pain of left knee     2. Decreased ROM of left knee     3. Decreased strength of lower extremity     4. Impaired gait and mobility       Pain: 0/10    Pt reports continued stiffness however no pain today. Primarily notices this when standing after sitting for prolonged periods.    Objective:     Aga received therapeutic exercises for 45 minutes 1:1 with PT to develop strength, endurance, ROM and flexibility per log below. Manual therapy for 10 minutes to L LE including grade II-IV patellar mobs in all directions; overpressure with heel slides; knee flexion contract relax in sitting 4 bouts x 10" holds. Cold pack for 10 minutes to L knee with L LE elevated on traction stool.     L knee AROM: 0-104 degrees; PROM: 0-108 degrees     Date  10/26/18   VISIT 13   Gcode 3/10   FOTO dc   Cap Visit   Cap Total 118.42  1359.44       Bike 5' NC       MT 10'       HS stretch @ stairs 3x30" L   Knee flexion stretch @ stairs 5x30" L   Gastroc Str. 3x30" DL fitter       Heel Slides 10"x10 w/strap   Prone knee flexion  10"x20 w/strap       Cybex   Leg Press OOT   TKE PTC 2x15 3" hold   Hip Abd GTC 2x10   Hip Flex GTC 2x10   Hip Ext GTC 2x10   HS Curls NT   Knee Ext NT   Sit<>stands 2x10 DL, no UE supp   Mini squats x10 DL, B UE supp   Step Ups Blue 2x10 L   Step Downs L1 4x5 L, focus on R heel tap vs FWB       Gait        CP 10'       Initials CC     Assessment:     Improvement in knee flexion ROM following contract relax. Unsure if pt is performing bending exercises as prescribed at " home.    Patient Education/Response:     Continue HEP, add sit<>stands and mini squats. Increase bending frequency. Pt verbalized understanding.    Plans and Goals:     Continue per POC, focus on knee flexion and LE strength progression.    Goals:   Short Term Goals:   1. Pt will be independent with HEP  2. Pt will improve (L) LE strength to at least 75% of (R) LE strength as measured via MicroFet handheld dynamometer in order to improve functional mobility  3. Pt will improve (L) knee AROM to at least 3-110 degrees in order to improve gait     Long Term Goals:  1. Pt will be independent with updated HEP  2. Pt will improve (L) LE strength to at least 90% of (R) LE strength as measured via MicroFet handheld dynamometer in order to improve functional mobility  3. Pt will improve (L) knee AROM to at least 0-115 degrees in order to improve gait and ability to perform ADLs  4. Pt will improve FOTO knee survey score to </= 50% limited in order to demo improved functional mobility  5. Pt will improve score on Function,Daily Living section of KOOS to at least 24/68 ( 35% limited) in order to demo improved functional mobility  6. Pt will perform TUG in < 10 seconds without AD in order to demo improved gait speed  7. Pt will perform at least 14 sit to stands without UE support on 30 second sit to stand test in order to demo improved ability to perform transfers

## 2018-10-29 ENCOUNTER — CLINICAL SUPPORT (OUTPATIENT)
Dept: REHABILITATION | Facility: HOSPITAL | Age: 78
End: 2018-10-29
Payer: MEDICARE

## 2018-10-29 DIAGNOSIS — M25.562 CHRONIC PAIN OF LEFT KNEE: ICD-10-CM

## 2018-10-29 DIAGNOSIS — R29.898 DECREASED STRENGTH OF LOWER EXTREMITY: ICD-10-CM

## 2018-10-29 DIAGNOSIS — R26.89 IMPAIRED GAIT AND MOBILITY: ICD-10-CM

## 2018-10-29 DIAGNOSIS — M25.662 DECREASED ROM OF LEFT KNEE: ICD-10-CM

## 2018-10-29 DIAGNOSIS — G89.29 CHRONIC PAIN OF LEFT KNEE: ICD-10-CM

## 2018-10-29 PROCEDURE — 97110 THERAPEUTIC EXERCISES: CPT | Mod: PN

## 2018-10-29 NOTE — PROGRESS NOTES
"DAILY TREATMENT NOTE    DATE: 10/29/2018    Start Time: 1001  Stop Time: 1114    PROCEDURES:    TIMED  Procedure Min.   TE supervised    TE 53   MT 10             UNTIMED  Procedure Min.   CP 10   Bike 10     Total Timed Minutes: 63  Total Timed Units:  4  Total Untimed Units:  1  Charges Billed/# of units: TE-4      Progress/Current Status    Subjective:     Patient ID: Aga Rodriges is a 78 y.o. female.  Diagnosis:   1. Chronic pain of left knee     2. Decreased ROM of left knee     3. Decreased strength of lower extremity     4. Impaired gait and mobility       Pain: 0/10    Pt reports pain following Fridays session, comparing levels to that of post surgery. Complaint of discomfort currently. Pt complains of sciatic nerve pain at end of session, reports this began a month before surgery.    Objective:     Aga received therapeutic exercises for 53 minutes 1:1 with PT to develop strength, endurance, ROM and flexibility per log below. Manual therapy for 10 minutes to L LE including grade II-IV patellar mobs in all directions; STM to medial and lateral aspect of knee, quad, HS, and gastroc; perpendicular and parallel to scar. Cold pack for 10 minutes to L knee with L LE elevated on traction stool.     L knee AROM: 0-103 degrees; PROM: 0-110 degrees     Date  10/29/18 10/26/18   VISIT 14 13   Gcode 4/10 3/10   FOTO MD dc   Cap Visit   Cap Total 118.42  1477.86 118.42  1359.44        Bike 10' multiple adjustments of seat position to increase knee flexion ROM 5' NC        MT 10' 10'        HS stretch @ stairs 3x30" L 3x30" L   Knee flexion stretch @ stairs 5x30" L 5x30" L   Gastroc Str. 3x30" DL fitter 3x30" DL fitter        Heel Slides 10"x20 w/strap 10"x10 w/strap   Prone knee flexion  10"x20 w/strap 10"x20 w/strap        Cybex   Leg Press OOT next OOT   TKE HEP PTC 2x15 3" hold   Hip Abd OOT next GTC 2x10   Hip Flex OOT next GTC 2x10   Hip Ext OOT next GTC 2x10   Knee Ext OOT next NT   Sit<>stands 2x10 " DL, no UE supp 2x10 DL, no UE supp   Mini squats 2x10 DL, B UE supp x10 DL, B UE supp   Step Ups Blue 2x10 L, no UE support Blue 2x10 L   Step Downs L1 x10 L, focus on R heel tap vs FWB; B UE support L1 4x5 L, focus on R heel tap vs FWB        Gait          CP 10' 10'        Initials CC CC     Assessment:     Carryover in flexion ROM improvement from last session. Progressing well with standing exercises.    Patient Education/Response:     Continue HEP. Continue to focus on flexion throughout the day. Pt verbalized understanding.    Plans and Goals:     Continue per POC, focus on knee flexion and LE strength progression.    Goals:   Short Term Goals:   1. Pt will be independent with HEP  2. Pt will improve (L) LE strength to at least 75% of (R) LE strength as measured via MicroFet handheld dynamometer in order to improve functional mobility  3. Pt will improve (L) knee AROM to at least 3-110 degrees in order to improve gait     Long Term Goals:  1. Pt will be independent with updated HEP  2. Pt will improve (L) LE strength to at least 90% of (R) LE strength as measured via MicroFet handheld dynamometer in order to improve functional mobility  3. Pt will improve (L) knee AROM to at least 0-115 degrees in order to improve gait and ability to perform ADLs  4. Pt will improve FOTO knee survey score to </= 50% limited in order to demo improved functional mobility  5. Pt will improve score on Function,Daily Living section of KOOS to at least 24/68 ( 35% limited) in order to demo improved functional mobility  6. Pt will perform TUG in < 10 seconds without AD in order to demo improved gait speed  7. Pt will perform at least 14 sit to stands without UE support on 30 second sit to stand test in order to demo improved ability to perform transfers

## 2018-10-31 ENCOUNTER — CLINICAL SUPPORT (OUTPATIENT)
Dept: REHABILITATION | Facility: HOSPITAL | Age: 78
End: 2018-10-31
Payer: MEDICARE

## 2018-10-31 DIAGNOSIS — M25.662 DECREASED ROM OF LEFT KNEE: ICD-10-CM

## 2018-10-31 DIAGNOSIS — G89.29 CHRONIC PAIN OF LEFT KNEE: ICD-10-CM

## 2018-10-31 DIAGNOSIS — M25.562 CHRONIC PAIN OF LEFT KNEE: ICD-10-CM

## 2018-10-31 DIAGNOSIS — R29.898 DECREASED STRENGTH OF LOWER EXTREMITY: ICD-10-CM

## 2018-10-31 DIAGNOSIS — R26.89 IMPAIRED GAIT AND MOBILITY: ICD-10-CM

## 2018-10-31 PROCEDURE — 97140 MANUAL THERAPY 1/> REGIONS: CPT | Mod: PN

## 2018-10-31 PROCEDURE — 97110 THERAPEUTIC EXERCISES: CPT | Mod: PN

## 2018-10-31 NOTE — PROGRESS NOTES
"DAILY TREATMENT NOTE    DATE: 10/31/2018    Start Time: 1000  Stop Time: 1110    PROCEDURES:    TIMED  Procedure Min.   TE supervised 30   TE 20   MT 10             UNTIMED  Procedure Min.   CP 10   Bike      Total Timed Minutes: 30  Total Timed Units:  2  Total Untimed Units:  0  Charges Billed/# of units: TE-1 + MT-1      Progress/Current Status    Subjective:     Patient ID: Aga Rodriges is a 78 y.o. female.  Diagnosis:   1. Chronic pain of left knee     2. Decreased ROM of left knee     3. Decreased strength of lower extremity     4. Impaired gait and mobility       Pain: 0/10  Pt reports she has been having trouble sleeping at night with pain behind her L knee that partially started before the surgery.    Objective:     Aga received therapeutic exercises for 20 minutes 1:1 with PT to develop strength, endurance, ROM and flexibility per log below and bike x 5 mins with cues to decreased R trunk lean and decrease B knee varus movements . Pt then performed TE x 30 mins per log below supervised.   Manual therapy for 10 minutes to L LE including grade II-IV patellar mobs in all directions; L knee extension stretching, and STM to medial and lateral aspect of knee, quad, HS, and gastroc; perpendicular and parallel to scar.   Cold pack for 10 minutes to L knee with L LE elevated on traction stool.     L knee AROM: 0-105 degrees; PROM: 0-110 degrees     Date  10/31/18 10/29/18 10/26/18   VISIT 15 14 13   Gcode 5/10 4/10 3/10   FOTO  dc dc   Cap Visit   Cap Total 57.78  1535.64 118.42  1477.86 118.42  1359.44         Bike 5' cues to decrease compensations 10' multiple adjustments of seat position to increase knee flexion ROM 5' NC         MT 10' 10' 10'         HS stretch @ stairs 3x30'' L 3x30" L 3x30" L   Knee flexion stretch @ stairs 5x30'' L 5x30" L 5x30" L   Gastroc Str. 3x30'' DL 3x30" DL fitter 3x30" DL fitter   Prone hangs 3' c/ 5# L     Heel Slides 10x10'' w/ strap 10"x20 w/strap 10"x10 w/strap " "  Prone knee flexion  NT 10"x20 w/strap 10"x20 w/strap         Cybex   Leg Press Yes  4.0 plates OOT next OOT   TKE -- HEP PTC 2x15 3" hold   Hip Abd -- OOT next GTC 2x10   Hip Flex -- OOT next GTC 2x10   Hip Ext -- OOT next GTC 2x10   Knee Ext -- OOT next NT   Sit<>stands 2x10 DL, no UE supp 2x10 DL, no UE supp 2x10 DL, no UE supp   Mini squats 2x10 DL, B UE supp 2x10 DL, B UE supp x10 DL, B UE supp   Step Ups Blue 2x10 L, no UE support Blue 2x10 L, no UE support Blue 2x10 L   Step Downs L1 x10 L, focus on R heel tap vs FWB; B UE support L1 x10 L, focus on R heel tap vs FWB; B UE support L1 4x5 L, focus on R heel tap vs FWB         Gait            CP 10' 10' 10'         Initials AYESHA CC CC     Assessment:     Pt demo full passive and then active after manual L knee extension today. Pt progressing well and demo fair tolerance of exercises. Mod cues given throughout for form and execution.    Patient Education/Response:     Continue HEP. Continue to focus on flexion throughout the day. Pt verbalized understanding.    Plans and Goals:     Continue per POC, focus on knee flexion and LE strength progression.    Goals:   Short Term Goals:   1. Pt will be independent with HEP  2. Pt will improve (L) LE strength to at least 75% of (R) LE strength as measured via MicroFet handheld dynamometer in order to improve functional mobility  3. Pt will improve (L) knee AROM to at least 3-110 degrees in order to improve gait     Long Term Goals:  1. Pt will be independent with updated HEP  2. Pt will improve (L) LE strength to at least 90% of (R) LE strength as measured via MicroFet handheld dynamometer in order to improve functional mobility  3. Pt will improve (L) knee AROM to at least 0-115 degrees in order to improve gait and ability to perform ADLs  4. Pt will improve FOTO knee survey score to </= 50% limited in order to demo improved functional mobility  5. Pt will improve score on Function,Daily Living section of KOOS to at " least 24/68 ( 35% limited) in order to demo improved functional mobility  6. Pt will perform TUG in < 10 seconds without AD in order to demo improved gait speed  7. Pt will perform at least 14 sit to stands without UE support on 30 second sit to stand test in order to demo improved ability to perform transfers

## 2018-11-05 ENCOUNTER — CLINICAL SUPPORT (OUTPATIENT)
Dept: REHABILITATION | Facility: HOSPITAL | Age: 78
End: 2018-11-05
Payer: MEDICARE

## 2018-11-05 DIAGNOSIS — G89.29 CHRONIC PAIN OF LEFT KNEE: ICD-10-CM

## 2018-11-05 DIAGNOSIS — R29.898 DECREASED STRENGTH OF LOWER EXTREMITY: ICD-10-CM

## 2018-11-05 DIAGNOSIS — R26.89 IMPAIRED GAIT AND MOBILITY: ICD-10-CM

## 2018-11-05 DIAGNOSIS — M25.662 DECREASED ROM OF LEFT KNEE: ICD-10-CM

## 2018-11-05 DIAGNOSIS — M25.562 CHRONIC PAIN OF LEFT KNEE: ICD-10-CM

## 2018-11-05 PROCEDURE — 97110 THERAPEUTIC EXERCISES: CPT | Mod: PN

## 2018-11-05 NOTE — PROGRESS NOTES
"DAILY TREATMENT NOTE    DATE: 11/5/2018    Start Time:  10:00 am   Stop Time:  11:09 am     PROCEDURES:    TIMED  Procedure Min.   TE supervised 35' NC   TE 24'                  UNTIMED  Procedure Min.   CP 10'          Total Timed Minutes:  24'  Total Timed Units:  2  Total Untimed Units:  1  Charges Billed/# of units:  2 TE      Progress/Current Status    Subjective:     Patient ID: Aga Rodriges is a 78 y.o. female.  Diagnosis:   1. Chronic pain of left knee     2. Decreased ROM of left knee     3. Decreased strength of lower extremity     4. Impaired gait and mobility       Pain: 0 /10  Pt stated that her (L) knee is feeling better.     Objective:     Aga received therapeutic exercises for 35 minutes supervised and x 24' 1:1 with PT to develop strength, endurance, ROM and flexibility per log below   Cold pack for 10 minutes to L knee with L LE elevated on traction stool.          Date  11/5/18 10/31/18 10/29/18 10/26/18   VISIT 16 15 14 13   Gcode 6/10 5/10 4/10 3/10   [facility] dc   Cap Visit   Cap Total 60.64  1596.28 57.78  1535.64 118.42  1477.86 118.42  1359.44          Bike 5'  5' cues to decrease compensations 10' multiple adjustments of seat position to increase knee flexion ROM 5' NC          MT  10' 10' 10'          HS stretch @ stairs 3x30" L 3x30'' L 3x30" L 3x30" L   Knee flexion stretch @ stairs 5 x30" L 5x30'' L 5x30" L 5x30" L   Gastroc Str. 3x30" DL  3x30'' DL 3x30" DL fitter 3x30" DL fitter   Prone hangs  3' c/ 5# L     Heel Slides 10"x20 w/strap 10x10'' w/ strap 10"x20 w/strap 10"x10 w/strap   Prone knee flexion  10"x10 w/strap NT 10"x20 w/strap 10"x20 w/strap          Cybex   Leg Press 5.0 2x10  Yes  4.0 plates OOT next OOT   TKE  -- HEP PTC 2x15 3" hold   Hip Abd GTC  -- OOT next GTC 2x10   Hip Flex GTC 2x10 -- OOT next GTC 2x10   Hip Ext GTC 2x10 -- OOT next GTC 2x10   Knee Ext GTC 2x10 -- OOT next NT   Sit<>stands 2x10 DL  2x10 DL, no UE supp 2x10 DL, no UE supp 2x10 DL, no UE " supp   Mini squats 2x12 DL B UE supp 2x10 DL, B UE supp 2x10 DL, B UE supp x10 DL, B UE supp   Step Ups Blue 2x10 L no UE Blue 2x10 L, no UE support Blue 2x10 L, no UE support Blue 2x10 L   Step Downs L1 x15  L1 x10 L, focus on R heel tap vs FWB; B UE support L1 x10 L, focus on R heel tap vs FWB; B UE support L1 4x5 L, focus on R heel tap vs FWB   HSC 3.0 2x10       Gait              CP 10' 10' 10' 10'          Initials CK AYESHA CC CC       Assessment:     Pt tolerated therapy session without any adverse reactions. Will take measurements next visit to determine if pt is appropriate for DC.     Patient Education/Response:     Continue with HEP     Plans and Goals:     Continue per POC, focus on knee flexion and LE strength progression. Re-assess next visit.     Goals:   Short Term Goals:   1. Pt will be independent with HEP  2. Pt will improve (L) LE strength to at least 75% of (R) LE strength as measured via MicroFet handheld dynamometer in order to improve functional mobility  3. Pt will improve (L) knee AROM to at least 3-110 degrees in order to improve gait     Long Term Goals:  1. Pt will be independent with updated HEP  2. Pt will improve (L) LE strength to at least 90% of (R) LE strength as measured via MicroFet handheld dynamometer in order to improve functional mobility  3. Pt will improve (L) knee AROM to at least 0-115 degrees in order to improve gait and ability to perform ADLs  4. Pt will improve FOTO knee survey score to </= 50% limited in order to demo improved functional mobility  5. Pt will improve score on Function,Daily Living section of KOOS to at least 24/68 ( 35% limited) in order to demo improved functional mobility  6. Pt will perform TUG in < 10 seconds without AD in order to demo improved gait speed  7. Pt will perform at least 14 sit to stands without UE support on 30 second sit to stand test in order to demo improved ability to perform transfe

## 2018-11-07 ENCOUNTER — CLINICAL SUPPORT (OUTPATIENT)
Dept: REHABILITATION | Facility: HOSPITAL | Age: 78
End: 2018-11-07
Payer: MEDICARE

## 2018-11-07 DIAGNOSIS — G89.29 CHRONIC PAIN OF LEFT KNEE: ICD-10-CM

## 2018-11-07 DIAGNOSIS — R29.898 DECREASED STRENGTH OF LOWER EXTREMITY: ICD-10-CM

## 2018-11-07 DIAGNOSIS — R26.89 IMPAIRED GAIT AND MOBILITY: ICD-10-CM

## 2018-11-07 DIAGNOSIS — M25.562 CHRONIC PAIN OF LEFT KNEE: ICD-10-CM

## 2018-11-07 DIAGNOSIS — M25.662 DECREASED ROM OF LEFT KNEE: ICD-10-CM

## 2018-11-07 PROCEDURE — 97110 THERAPEUTIC EXERCISES: CPT | Mod: PN

## 2018-11-07 NOTE — PROGRESS NOTES
"DAILY TREATMENT NOTE    DATE: 11/7/2018    Start Time:  10:09 am   Stop Time:  11:10 am     PROCEDURES:    TIMED  Procedure Min.   TE supervised 26' NC   TE 25'                  UNTIMED  Procedure Min.   CP 10'          Total Timed Minutes:  25'   Total Timed Units:  2  Total Untimed Units:  1  Charges Billed/# of units:  2 TE      Progress/Current Status    Subjective:     Patient ID: Aga Rodriges is a 78 y.o. female.  Diagnosis:   1. Chronic pain of left knee     2. Decreased ROM of left knee     3. Decreased strength of lower extremity     4. Impaired gait and mobility       Pain: 0 /10  Pt stated that she did a lot of exercises in the pool yesterday.     Objective:     Aga received therapeutic exercises for 26 minutes supervised and x 25' 1:1 with PT to develop strength, endurance, ROM and flexibility per log below and including objective measurements.  Cold pack for 10 minutes to L knee with L LE elevated on traction stool.          Date  11/7/18 11/5/18 10/31/18 10/29/18 10/26/18   VISIT 17 16 15 14 13   Gcode 7/10 6/10 5/10 4/10 3/10   [facility] dc   Cap Visit   Cap Total 60.64  1656.92 60.64  1596.28 57.78  1535.64 118.42  1477.86 118.42  1359.44           Bike 8' 5'  5' cues to decrease compensations 10' multiple adjustments of seat position to increase knee flexion ROM 5' NC           MT   10' 10' 10'           HS stretch @ stairs 3x30" 3x30" L 3x30'' L 3x30" L 3x30" L   Knee flexion stretch @ stairs 3x30" 5 x30" L 5x30'' L 5x30" L 5x30" L   Gastroc Str. 3x30" 3x30" DL  3x30'' DL 3x30" DL fitter 3x30" DL fitter   Prone hangs 3' 4# L  3' c/ 5# L     Heel Slides 10"x20 10"x20 w/strap 10x10'' w/ strap 10"x20 w/strap 10"x10 w/strap   Prone knee flexion  10"x10 w/strap  10"x10 w/strap NT 10"x20 w/strap 10"x20 w/strap           Cybex   Leg Press 5.0 2x15 5.0 2x10  Yes  4.0 plates OOT next OOT   TKE PTC 2x15 3"   -- HEP PTC 2x15 3" hold   Hip Abd  GTC  -- OOT next GTC 2x10   Hip Flex  GTC 2x10 -- " OOT next GTC 2x10   Hip Ext  GTC 2x10 -- OOT next GTC 2x10   Knee Ext  GTC 2x10 -- OOT next NT   Sit<>stands  2x10 DL  2x10 DL, no UE supp 2x10 DL, no UE supp 2x10 DL, no UE supp   Mini squats  2x12 DL B UE supp 2x10 DL, B UE supp 2x10 DL, B UE supp x10 DL, B UE supp   Step Ups  Blue 2x10 L no UE Blue 2x10 L, no UE support Blue 2x10 L, no UE support Blue 2x10 L   Step Downs  L1 x15  L1 x10 L, focus on R heel tap vs FWB; B UE support L1 x10 L, focus on R heel tap vs FWB; B UE support L1 4x5 L, focus on R heel tap vs FWB   HSC  3.0 2x10       Gait                CP 10' 10' 10' 10' 10'           Initials CK CK AYESHA CC CC       AROM:   left: 5-108 degrees     PROM:   left: 0-113 degrees     L/E Strength w/ MicroFET Muscle Angelina Dynamometer Right Left Pain/Dysfunction with Movement   (approx 4 sec hold w/ max contraction)   Hip Flexion 9.0 kg  7.3 kg      Hip Abduction 12.9 kg  10.9 kg      Quadriceps 11.2 kg  8.8 kg      Hamstrings 10.7 kg  10.8 kg         TU seconds (w/o spc)     30 second sit-to-stand test (without U/E support): 14      Assessment:     Pt demo improved (L) knee flexion AROM compared to measurements taken last week. Pt presented lacking 5 degrees of (L) knee extension so resumed prone hangs to help improve (L) knee extension AROM. Pt tolerated therapy session without any adverse reactions. Pt should be appropriate for DC next week.     Patient Education/Response:     Continue with HEP. Instructed pt not to perform exercises in pool and to perform HEP as instructed to ensure that pt stays within POC. Pt verbalized understanding.     Plans and Goals:     Continue per POC, focus on knee flexion and LE strength progression.     Goals:   Short Term Goals:   1. Pt will be independent with HEP  2. Pt will improve (L) LE strength to at least 75% of (R) LE strength as measured via MicroFet handheld dynamometer in order to improve functional mobility  3. Pt will improve (L) knee AROM to at least 3-110  degrees in order to improve gait     Long Term Goals:  1. Pt will be independent with updated HEP  2. Pt will improve (L) LE strength to at least 90% of (R) LE strength as measured via MicroFet handheld dynamometer in order to improve functional mobility  3. Pt will improve (L) knee AROM to at least 0-115 degrees in order to improve gait and ability to perform ADLs  4. Pt will improve FOTO knee survey score to </= 50% limited in order to demo improved functional mobility  5. Pt will improve score on Function,Daily Living section of KOOS to at least 24/68 ( 35% limited) in order to demo improved functional mobility  6. Pt will perform TUG in < 10 seconds without AD in order to demo improved gait speed  7. Pt will perform at least 14 sit to stands without UE support on 30 second sit to stand test in order to demo improved ability to perform transfe

## 2018-11-12 ENCOUNTER — CLINICAL SUPPORT (OUTPATIENT)
Dept: REHABILITATION | Facility: HOSPITAL | Age: 78
End: 2018-11-12
Payer: MEDICARE

## 2018-11-12 DIAGNOSIS — G89.29 CHRONIC PAIN OF LEFT KNEE: ICD-10-CM

## 2018-11-12 DIAGNOSIS — R29.898 DECREASED STRENGTH OF LOWER EXTREMITY: ICD-10-CM

## 2018-11-12 DIAGNOSIS — R26.89 IMPAIRED GAIT AND MOBILITY: ICD-10-CM

## 2018-11-12 DIAGNOSIS — M25.662 DECREASED ROM OF LEFT KNEE: ICD-10-CM

## 2018-11-12 DIAGNOSIS — M25.562 CHRONIC PAIN OF LEFT KNEE: ICD-10-CM

## 2018-11-12 PROCEDURE — 97140 MANUAL THERAPY 1/> REGIONS: CPT | Mod: PN

## 2018-11-12 PROCEDURE — 97110 THERAPEUTIC EXERCISES: CPT | Mod: PN

## 2018-11-12 NOTE — PROGRESS NOTES
"  Physical Therapy Daily Treatment Note     Name: Aga Aguilar [facility] Number: [id number]    Therapy Diagnosis:   Encounter Diagnoses   Name Primary?    Chronic pain of left knee     Decreased ROM of left knee     Decreased strength of lower extremity     Impaired gait and mobility      Physician: Damon Lancaster MD    Visit Date: 11/12/2018    Physician Orders: PT Eval and Treat  Medical Diagnosis: M17.12 (ICD-10-CM) - Primary osteoarthritis of left knee  Evaluation Date: 10/2/18  Authorization Period Expiration: 12/31/2018  Plan of Care Certification Period: 11/16/18  Visit #/Visits authorized: 18/ 19     Time In: 1158  Time Out: 1306  Total Billable Time: 26 minutes (MT-1, TE-1)    Precautions:Standard and HTN    Subjective     Pt reports: she is waking with less stiffness in the am, however after she's on her feet and moving around for a while her L knee begins to stiffen  She was compliant with home exercise program.  Response to previous treatment: no adverse reaction  Functional change: no change    Pain: not assessed today  Location: L knee    Objective     Aga received therapeutic exercises to develop strength, endurance, ROM and flexibility for 20 minutes including:  Below and objective measures taken, supervised TE x 32'    Date  11/12/18 11/7/18 11/5/18 10/31/18 10/29/18 10/26/18   VISIT 18 17 16 15 14 13   Gcode 8/10 7/10 6/10 5/10 4/10 3/10   FO     dc dc   Cap Visit   Cap Total 57.78  1714.70 60.64  1656.92 60.64  1596.28 57.78  1535.64 118.42  1477.86 118.42  1359.44            Bike 8' 8' 5'  5' cues to decrease compensations 10' multiple adjustments of seat position to increase knee flexion ROM 5' NC            MT 8'   10' 10' 10'            HS stretch @ stairs 3x30" L  3x30" L 3x30" 3x30" L 3x30'' L 3x30" L 3x30" L   Knee flexion stretch @ stairs 3x30" L 3x30" 5 x30" L 5x30'' L 5x30" L 5x30" L   Gastroc Str. 3x30" 3x30" 3x30" DL  3x30'' DL 3x30" DL fitter 3x30" DL fitter   Prone hangs " "3' 4# L 3' 4# L  3' c/ 5# L     Heel Slides 10"x20 10"x20 10"x20 w/strap 10x10'' w/ strap 10"x20 w/strap 10"x10 w/strap   Prone knee flexion  10"x10 w/strap  10"x10 w/strap  10"x10 w/strap NT 10"x20 w/strap 10"x20 w/strap            Cybex   Leg Press  5.0 2x15 5.0 2x10  Yes  4.0 plates OOT next OOT   TKE PTC 2x15 3" PTC 2x15 3"   -- HEP PTC 2x15 3" hold   Hip Abd   GTC  -- OOT next GTC 2x10   Hip Flex   GTC 2x10 -- OOT next GTC 2x10   Hip Ext   GTC 2x10 -- OOT next GTC 2x10   Knee Ext   GTC 2x10 -- OOT next NT   Sit<>stands   2x10 DL  2x10 DL, no UE supp 2x10 DL, no UE supp 2x10 DL, no UE supp   Mini squats   2x12 DL B UE supp 2x10 DL, B UE supp 2x10 DL, B UE supp x10 DL, B UE supp   Step Ups   Blue 2x10 L no UE Blue 2x10 L, no UE support Blue 2x10 L, no UE support Blue 2x10 L   Step Downs   L1 x15  L1 x10 L, focus on R heel tap vs FWB; B UE support L1 x10 L, focus on R heel tap vs FWB; B UE support L1 4x5 L, focus on R heel tap vs FWB   Knee ext 10# 3x10        HSC   3.0 2x10       Gait                  CP 10' 10' 10' 10' 10' 10'            Initials KV CK CK AYESHA CC CC         L knee AROM: (-5*) - 0 - 105*  L knee PROM: (+3*) - 0 - 110*      Aga received the following manual therapy techniques: Joint mobilizations were applied to the: L knee for 8 minutes, including:  -grade III-IV patellar mobs all directions  -knee extension str 3x10"    Aga received cold pack for 10 minutes to L knee.      Home Exercises Provided and Patient Education Provided     Education provided:   - will possibly d/c next visit    Written Home Exercises Provided: Patient instructed to cont prior HEP.  Exercises were reviewed and Aga was able to demonstrate them prior to the end of the session.  Aga demonstrated good  understanding of the education provided.     See EMR under Patient Instructions for exercises provided prior visit.    Assessment     Pt continues to lack L knee extension AROM, however knee joint is not stiff " and she is able to achieve near-full to full L knee extension with TKE.    Aga is progressing well towards her goals.   Pt prognosis is Good.     Pt will continue to benefit from skilled outpatient physical therapy to address the deficits listed in the problem list box on initial evaluation, provide pt/family education and to maximize pt's level of independence in the home and community environment.     Pt's spiritual, cultural and educational needs considered and pt agreeable to plan of care and goals.    Anticipated barriers to physical therapy: none    Goals:     Short Term Goals:   1. Pt will be independent with HEP  2. Pt will improve (L) LE strength to at least 75% of (R) LE strength as measured via MicroFet handheld dynamometer in order to improve functional mobility  3. Pt will improve (L) knee AROM to at least 3-110 degrees in order to improve gait     Long Term Goals:  1. Pt will be independent with updated HEP  2. Pt will improve (L) LE strength to at least 90% of (R) LE strength as measured via MicroFet handheld dynamometer in order to improve functional mobility  3. Pt will improve (L) knee AROM to at least 0-115 degrees in order to improve gait and ability to perform ADLs  4. Pt will improve FOTO knee survey score to </= 50% limited in order to demo improved functional mobility  5. Pt will improve score on Function,Daily Living section of KOOS to at least 24/68 ( 35% limited) in order to demo improved functional mobility  6. Pt will perform TUG in < 10 seconds without AD in order to demo improved gait speed  7. Pt will perform at least 14 sit to stands without UE support on 30 second sit to stand test in order to demo improved ability to perform transfe      Plan     D/c next visit.    Andreea Goins, PT

## 2018-11-14 ENCOUNTER — CLINICAL SUPPORT (OUTPATIENT)
Dept: REHABILITATION | Facility: HOSPITAL | Age: 78
End: 2018-11-14
Payer: MEDICARE

## 2018-11-14 DIAGNOSIS — M25.662 DECREASED ROM OF LEFT KNEE: ICD-10-CM

## 2018-11-14 DIAGNOSIS — G89.29 CHRONIC PAIN OF LEFT KNEE: ICD-10-CM

## 2018-11-14 DIAGNOSIS — R29.898 DECREASED STRENGTH OF LOWER EXTREMITY: ICD-10-CM

## 2018-11-14 DIAGNOSIS — R26.89 IMPAIRED GAIT AND MOBILITY: ICD-10-CM

## 2018-11-14 DIAGNOSIS — M25.562 CHRONIC PAIN OF LEFT KNEE: ICD-10-CM

## 2018-11-14 PROCEDURE — G8980 MOBILITY D/C STATUS: HCPCS | Mod: CJ,PN

## 2018-11-14 PROCEDURE — G8979 MOBILITY GOAL STATUS: HCPCS | Mod: CJ,PN

## 2018-11-14 PROCEDURE — 97110 THERAPEUTIC EXERCISES: CPT | Mod: PN

## 2018-11-14 NOTE — Clinical Note
Please see PT DC Summary for Aga Rodriges,  44. Thank you for this referral. Elisabeth Sheikh, [id number]

## 2018-12-31 ENCOUNTER — HOSPITAL ENCOUNTER (OUTPATIENT)
Dept: RADIOLOGY | Facility: OTHER | Age: 78
Discharge: HOME | End: 2018-12-31
Attending: ANESTHESIOLOGY
Payer: MEDICARE

## 2018-12-31 DIAGNOSIS — M54.10 RADICULAR PAIN OF LEFT LOWER EXTREMITY: ICD-10-CM

## 2018-12-31 DIAGNOSIS — Z47.1 AFTERCARE FOLLOWING LEFT KNEE JOINT REPLACEMENT SURGERY: ICD-10-CM

## 2018-12-31 DIAGNOSIS — Z96.652 AFTERCARE FOLLOWING LEFT KNEE JOINT REPLACEMENT SURGERY: ICD-10-CM

## 2018-12-31 PROCEDURE — 72148 MRI LUMBAR SPINE W/O DYE: CPT | Mod: 26,,, | Performed by: RADIOLOGY

## 2018-12-31 PROCEDURE — 72148 MRI LUMBAR SPINE WITHOUT CONTRAST: ICD-10-PCS | Mod: 26,,, | Performed by: RADIOLOGY

## 2018-12-31 PROCEDURE — 72148 MRI LUMBAR SPINE W/O DYE: CPT | Mod: TC

## 2019-12-02 NOTE — INTERVAL H&P NOTE
Chief Complaint   Patient presents with     Oncology Clinic Visit     Return visit. SARCOMA of SOFT TISSUE.      Port Draw     Labs drawn via port by RN in lab. VS taken. Patient checked in for next appt.     Port accessed with 20 gauge gripper needle by RN, labs collected, line flushed with saline and heparin.  Vitals taken. Pt checked in for appointment.    Samantha Chowdhury RN       The patient has been examined and the H&P has been reviewed:    I concur with the findings and no changes have occurred since H&P was written.    Anesthesia/Surgery risks, benefits and alternative options discussed and understood by patient/family.          Active Hospital Problems    Diagnosis  POA    Osteoarthritis of left knee [M17.12]  Yes      Resolved Hospital Problems   No resolved problems to display.

## 2020-05-05 NOTE — PROGRESS NOTES
"DAILY TREATMENT NOTE    DATE: 11/14/2018    Start Time:  8:02 am   Stop Time:  9:00 am     PROCEDURES:    TIMED  Procedure Min.   TE supervised 20' NC   TE 28'                  UNTIMED  Procedure Min.   CP 10'          Total Timed Minutes:  28'  Total Timed Units:  2  Total Untimed Units:  1  Charges Billed/# of units:  2 TE      Progress/Current Status    Subjective:     Patient ID: Aga Rodriges is a 78 y.o. female.  Diagnosis:   1. Chronic pain of left knee     2. Decreased ROM of left knee     3. Decreased strength of lower extremity     4. Impaired gait and mobility       Pain: 0 /10  Pt stated that her (L) knee has been feeling stiff, especially due to weather.     Objective:     Objective measurements were taken and Aga received therapeutic exercises to develop strength, endurance, ROM and flexibility for 28 minutes 1:1 with PT and 20' supervised per log. CP to (L) knee at end of session x 10'.       Date  11/14/18 11/12/18 11/7/18 11/5/18 10/31/18 10/29/18 10/26/18   VISIT 19 18 17 16 15 14 13   Gcode  8/10 7/10 6/10 5/10 4/10 3/10   FOTO      dc dc   Cap Visit   Cap Total  57.78  1714.70 60.64  1656.92 60.64  1596.28 57.78  1535.64 118.42  1477.86 118.42  1359.44             Bike 5' 8' 8' 5'  5' cues to decrease compensations 10' multiple adjustments of seat position to increase knee flexion ROM 5' NC             MT  8'   10' 10' 10'             HS stretch @ stairs 3x30"  3x30" L  3x30" L 3x30" 3x30" L 3x30'' L 3x30" L 3x30" L   Knee flexion stretch @ stairs  3x30" L 3x30" 5 x30" L 5x30'' L 5x30" L 5x30" L   Gastroc Str. 3x30"  3x30" 3x30" 3x30" DL  3x30'' DL 3x30" DL fitter 3x30" DL fitter   Prone hangs 5' 3# 3' 4# L 3' 4# L  3' c/ 5# L     Heel Slides 10"x10  10"x20 10"x20 10"x20 w/strap 10x10'' w/ strap 10"x20 w/strap 10"x10 w/strap   Prone knee flexion   10"x10 w/strap  10"x10 w/strap  10"x10 w/strap NT 10"x20 w/strap 10"x20 w/strap             Cybex   Leg Press 5.0 2x15  5.0 2x15 5.0 2x10  " Pt to cath lab   "Yes  4.0 plates OOT next OOT   TKE GTB 2x15 3" hold PTC 2x15 3" PTC 2x15 3"   -- HEP PTC 2x15 3" hold   Hip Abd    GTC  -- OOT next GTC 2x10   steamboats GTB 2x15         Hip Flex    GTC 2x10 -- OOT next GTC 2x10   Hip Ext    GTC 2x10 -- OOT next GTC 2x10   Knee Ext    GTC 2x10 -- OOT next NT   Sit<>stands    2x10 DL  2x10 DL, no UE supp 2x10 DL, no UE supp 2x10 DL, no UE supp   Mini squats    2x12 DL B UE supp 2x10 DL, B UE supp 2x10 DL, B UE supp x10 DL, B UE supp   Step Ups Blue 2x15   Blue 2x10 L no UE Blue 2x10 L, no UE support Blue 2x10 L, no UE support Blue 2x10 L   Step Downs L1 2x15   L1 x15  L1 x10 L, focus on R heel tap vs FWB; B UE support L1 x10 L, focus on R heel tap vs FWB; B UE support L1 4x5 L, focus on R heel tap vs FWB   Knee ext  10# 3x10        HSC 3.0 2x15   3.0 2x10       Gait                    CP  10' 10' 10' 10' 10' 10'             Initials CK KV CK CK AYESHA CC CC       Assessment:     See DC summary below     Patient Education/Response:     See DC summary below    Plans and Goals:     DC from PT     REHAB SERVICES OUTPATIENT DISCHARGE SUMMARY  Physical Therapy      Name:  Aga Rodriges  Date:  11/14/18  Date of Evaluation:  10/2/18  Physician:  Damon Lancaster MD  Total # Of Visits:  19  Diagnosis:    1. Chronic pain of left knee     2. Decreased ROM of left knee     3. Decreased strength of lower extremity     4. Impaired gait and mobility         Physical/Functional Status:  At time of discharge, patient had attended 18 PT follow up sessions since initial evaluation on 10/2/18. Pt has made steady progress with her PT treatments as evident by subjective and objective improvements. Pt demo improved (L) knee ROM, improved (L) LE strength, improved gait, and improved functional mobility compared to measurements taken on initial evaluation. Pt was agreeable to being discharged with HEP at this time. See objective measurements below.     AROM:   left: 4-107 degrees     PROM:   left: 0-113 " degrees     L/E Strength w/ MicroFET Muscle Angelina Dynamometer Right Left Pain/Dysfunction with Movement   (approx 4 sec hold w/ max contraction)   Hip Flexion 14.4 kg  13.3 kg      Hip Abduction 12.6 kg  11.9 kg      Quadriceps 11.0 kg  8.6 kg      Hamstrings 14.8 kg  11.1 kg         TU seconds (w/o spc)     30 second sit-to-stand test (without U/E support): 15    FOTO: 52% limited   Function, Daily Living section of KOOS: 15/68 (22% limited)    The patient is to be discharged from our Therapy service for the following reason(s):  Patient has reached the maximum rehab potential for the present time    Degree of Goal Achievement:  Patient has partially met goals    Goals:     Short Term Goals:   1. Pt will be independent with HEP - MET  2. Pt will improve (L) LE strength to at least 75% of (R) LE strength as measured via MicroFet handheld dynamometer in order to improve functional mobility - MET  3. Pt will improve (L) knee AROM to at least 3-110 degrees in order to improve gait - NOT MET     Long Term Goals:  1. Pt will be independent with updated HEP -MET  2. Pt will improve (L) LE strength to at least 90% of (R) LE strength as measured via MicroFet handheld dynamometer in order to improve functional mobility - PARTIALLY MET  3. Pt will improve (L) knee AROM to at least 0-115 degrees in order to improve gait and ability to perform ADLs - NOT MET  4. Pt will improve FOTO knee survey score to </= 50% limited in order to demo improved functional mobility - NOT MET  5. Pt will improve score on Function,Daily Living section of KOOS to at least 24 ( 35% limited) in order to demo improved functional mobility -MET  6. Pt will perform TUG in < 10 seconds without AD in order to demo improved gait speed - NOT MET  7. Pt will perform at least 14 sit to stands without UE support on 30 second sit to stand test in order to demo improved ability to perform transfer with less difficulty - MET    Patient Education:  Pt was  educated on and given handout on updated HEP. Pt demo/verbalized understanding.     Discharge Plan:  Home Program:  HEP; Pt plans to continue exercising at gym. Follow up with MD prn.

## 2020-07-30 DIAGNOSIS — M25.552 BILATERAL HIP PAIN: Primary | ICD-10-CM

## 2020-07-30 DIAGNOSIS — M25.551 BILATERAL HIP PAIN: Primary | ICD-10-CM

## 2020-08-05 ENCOUNTER — OFFICE VISIT (OUTPATIENT)
Dept: ORTHOPEDICS | Facility: CLINIC | Age: 80
End: 2020-08-05
Payer: MEDICARE

## 2020-08-05 ENCOUNTER — HOSPITAL ENCOUNTER (OUTPATIENT)
Dept: RADIOLOGY | Facility: HOSPITAL | Age: 80
Discharge: HOME | End: 2020-08-05
Attending: ORTHOPAEDIC SURGERY
Payer: MEDICARE

## 2020-08-05 VITALS — BODY MASS INDEX: 26.94 KG/M2 | WEIGHT: 146.38 LBS | HEIGHT: 62 IN

## 2020-08-05 DIAGNOSIS — M25.551 BILATERAL HIP PAIN: ICD-10-CM

## 2020-08-05 DIAGNOSIS — M54.16 LUMBAR RADICULOPATHY: Primary | ICD-10-CM

## 2020-08-05 DIAGNOSIS — M25.552 BILATERAL HIP PAIN: ICD-10-CM

## 2020-08-05 PROCEDURE — 73521 X-RAY EXAM HIPS BI 2 VIEWS: CPT | Mod: 26,,, | Performed by: RADIOLOGY

## 2020-08-05 PROCEDURE — 99999 PR PBB SHADOW E&M-EST. PATIENT-LVL III: ICD-10-PCS | Mod: PBBFAC,,, | Performed by: ORTHOPAEDIC SURGERY

## 2020-08-05 PROCEDURE — 73521 XR HIPS BILATERAL 2 VIEW INCL AP PELVIS: ICD-10-PCS | Mod: 26,,, | Performed by: RADIOLOGY

## 2020-08-05 PROCEDURE — 99203 OFFICE O/P NEW LOW 30 MIN: CPT | Mod: S$PBB,,, | Performed by: ORTHOPAEDIC SURGERY

## 2020-08-05 PROCEDURE — 73521 X-RAY EXAM HIPS BI 2 VIEWS: CPT | Mod: TC

## 2020-08-05 PROCEDURE — 99203 PR OFFICE/OUTPT VISIT, NEW, LEVL III, 30-44 MIN: ICD-10-PCS | Mod: S$PBB,,, | Performed by: ORTHOPAEDIC SURGERY

## 2020-08-05 PROCEDURE — 99999 PR PBB SHADOW E&M-EST. PATIENT-LVL III: CPT | Mod: PBBFAC,,, | Performed by: ORTHOPAEDIC SURGERY

## 2020-08-05 PROCEDURE — 99213 OFFICE O/P EST LOW 20 MIN: CPT | Mod: PBBFAC,25 | Performed by: ORTHOPAEDIC SURGERY

## 2020-08-05 NOTE — PROGRESS NOTES
Subjective:      Patient ID: Aga Rodriges is a 80 y.o. female.    Chief Complaint: Pain of the Left Hip    HPI  Aga Rodriges is a 80 year old female here with a several month history of left hip pain. The patient is a  retiree. There was not a history of recent  trauma.  The pain is moderate The pain is located in the lateral thigh  and buttock.  There is is radiation.  The pain radaites to the lateral leg.  The pain is described as achy. The patient has not had prior hip surgery. It is aggravated by sitting and standing.  It  is alleviated by walking. There is numbness or tingling of the lower extremity.  There is back pain.  She  Has tried NSAIDS and activity modification..   She does have difficulty getting in or out of a car, getting dressed, or going up or down stairs.  The patient does not use an assistive device.    Past Medical History:   Diagnosis Date    Acid reflux     Hypertension      Past Surgical History:   Procedure Laterality Date    CARPAL TUNNEL RELEASE Left     HYSTERECTOMY      PERCUTANEOUS CRYOTHERAPY OF PERIPHERAL NERVE USING LIQUID NITROUS OXIDE IN CLOSED NEEDLE DEVICE Left 9/27/2018    Procedure: CRYOTHERAPY, NERVE, PERIPHERAL, PERCUTANEOUS, USING LIQUID NITROUS OXIDE IN CLOSED NEEDLE DEVICE;  Surgeon: KANA Sullivan;  Location: [facility] OR;  Service: Orthopedics;  Laterality: Left;     Family History   Problem Relation Age of Onset    Other Mother         enlarged heart    Cancer Sister     Emphysema Brother      Social History     Socioeconomic History    Marital status: Single     Spouse name: Not on file    Number of children: Not on file    Years of education: Not on file    Highest education level: Not on file   Occupational History    Not on file   Social Needs    Financial resource strain: Not on file    Food insecurity     Worry: Not on file     Inability: Not on file    Transportation needs     Medical: Not on file     Non-medical: Not on file    Tobacco Use    Smoking status: Never Smoker    Smokeless tobacco: Never Used   Substance and Sexual Activity    Alcohol use: Yes     Frequency: 2-3 times a week     Drinks per session: 1 or 2     Binge frequency: Never     Comment: socially    Drug use: No    Sexual activity: Not on file   Lifestyle    Physical activity     Days per week: Not on file     Minutes per session: Not on file    Stress: Not on file   Relationships    Social connections     Talks on phone: Not on file     Gets together: Not on file     Attends Spiritism service: Not on file     Active member of club or organization: Not on file     Attends meetings of clubs or organizations: Not on file     Relationship status: Not on file   Other Topics Concern    Not on file   Social History Narrative    Not on file     Current Outpatient Medications on File Prior to Visit   Medication Sig Dispense Refill    amLODIPine (NORVASC) 10 MG tablet Take 10 mg by mouth once daily.      ATIVAN 1 mg tablet 1 mg as needed.       atorvastatin (LIPITOR) 10 MG tablet 10 mg once daily.       cloNIDine 0.2 mg/24 hr td ptwk (CATAPRES) 0.2 mg/24 hr Place onto the skin once a week.       COZAAR 100 mg tablet 100 mg 2 (two) times daily.       acetaminophen (TYLENOL) 650 MG TbSR Take 650 mg by mouth every 8 (eight) hours.      aspirin (ECOTRIN) 81 MG EC tablet Take 1 tablet (81 mg total) by mouth 2 (two) times daily. 84 tablet 0    ergocalciferol (VITAMIN D2) 50,000 unit Cap Take 50,000 Units by mouth every 7 days.      famotidine (PEPCID) 20 MG tablet Take 1 tablet (20 mg total) by mouth 2 (two) times daily. 84 tablet 0    omeprazole (PRILOSEC) 20 MG capsule 20 mg once daily.       ondansetron (ZOFRAN) 4 MG tablet Take 1 tablet (4 mg total) by mouth every 6 (six) hours as needed for Nausea. (Patient not taking: Reported on 8/5/2020) 12 tablet 0    timolol maleate 0.5% (TIMOPTIC) 0.5 % Drop 1 drop every evening.       No current facility-administered  "medications on file prior to visit.      Review of patient's allergies indicates:  No Known Allergies    Review of Systems   Constitution: Negative for chills, fever and night sweats.   HENT: Negative for hearing loss.    Eyes: Negative for blurred vision and double vision.   Cardiovascular: Negative for chest pain, claudication and leg swelling.   Respiratory: Negative for shortness of breath.    Endocrine: Negative for polydipsia, polyphagia and polyuria.   Hematologic/Lymphatic: Negative for adenopathy and bleeding problem. Does not bruise/bleed easily.   Skin: Negative for poor wound healing.   Gastrointestinal: Negative for diarrhea and heartburn.   Genitourinary: Negative for bladder incontinence.   Neurological: Negative for focal weakness, headaches, numbness, paresthesias and sensory change.   Psychiatric/Behavioral: The patient is not nervous/anxious.    Allergic/Immunologic: Negative for persistent infections.         Objective:      Body mass index is 26.77 kg/m².  Vitals:    08/05/20 1429   Weight: 66.4 kg (146 lb 6.2 oz)   Height: 5' 2" (1.575 m)         General    Constitutional: She is oriented to person, place, and time. She appears well-developed and well-nourished.   HENT:   Head: Normocephalic and atraumatic.   Eyes: EOM are normal.   Cardiovascular: Normal rate.    Pulmonary/Chest: Effort normal.   Neurological: She is alert and oriented to person, place, and time.   Psychiatric: She has a normal mood and affect. Her behavior is normal.     General Musculoskeletal Exam   Gait: normal   Pelvic Obliquity: none      Right Knee Exam     Inspection   Alignment:  normal  Effusion: absent    Left Knee Exam     Inspection   Alignment:  normal  Effusion: absent    Right Hip Exam     Inspection   Scars: absent  Swelling: absent  Bruising: absent  No deformity of hip.  Quadriceps Atrophy:  Negative  Erythema: absent    Range of Motion   Abduction: 25   Adduction: 20   Extension: 0   Flexion: 100   External " rotation: 30   Internal rotation: 25     Tests   Pain w/ forced internal rotation (MARCUS): absent  Stinchfield test: negative    Other   Sensation: normal  Left Hip Exam     Inspection   Scars: absent  Swelling: absent  No deformity of hip.  Quadriceps Atrophy:  negative  Erythema: absent  Bruising: absent    Tenderness   The patient tender to palpation of the trochanteric bursa.    Range of Motion   Abduction: 25   Adduction: 20   Extension: 0   Flexion: 100   External rotation: 30   Internal rotation: 25     Tests   Pain w/ forced internal rotation (MARCUS): absent  Stinchfield test: negative    Other   Sensation: normal      Back (L-Spine & T-Spine) / Neck (C-Spine) Exam   Back exam is normal.      Muscle Strength   Right Lower Extremity   Hip Abduction: 5/5   Hip Adduction: 5/5   Hip Flexion: 5/5   Ankle Dorsiflexion:  5/5   Left Lower Extremity   Hip Abduction: 5/5   Hip Adduction: 5/5   Hip Flexion: 5/5   Ankle Dorsiflexion:  5/5     Reflexes     Left Side  Quadriceps:  2+    Right Side   Quadriceps:  2+    Vascular Exam     Right Pulses  Dorsalis Pedis:      2+          Left Pulses  Dorsalis Pedis:      2+          Capillary Refill  Right Hand: normal capillary refill  Left Hand: normal capillary refill    Edema  Right Upper Leg: absent  Left Upper Leg: absent      Radiographs taken today and reviewed by me  demonstrate minimal arthritic change of the bilateral hip(s).There  is not bone destruction.  There is not a fracture.  The visualized portion of the lumbar spine demonstrates moderate arthritic change.               Assessment:       Encounter Diagnosis   Name Primary?    Lumbar radiculopathy Yes          Plan:       Aga was seen today for pain.    Diagnoses and all orders for this visit:    Lumbar radiculopathy  -     MRI Lumbar Spine Without Contrast; Future      Options were discussed at length.  She has had pain for greater than 3 months.  There is no relief with activity modification or NSAIDS.   Sheis having radicular symptoms.  An MRI is indicated.  F/U after the MRI.    She was instructed to remove her transdermal medication patch prior to the MRI.

## 2020-08-10 ENCOUNTER — HOSPITAL ENCOUNTER (OUTPATIENT)
Dept: RADIOLOGY | Facility: HOSPITAL | Age: 80
Discharge: HOME | End: 2020-08-10
Attending: ORTHOPAEDIC SURGERY
Payer: MEDICARE

## 2020-08-10 DIAGNOSIS — M54.16 LUMBAR RADICULOPATHY: ICD-10-CM

## 2020-08-10 PROCEDURE — 72148 MRI LUMBAR SPINE WITHOUT CONTRAST: ICD-10-PCS | Mod: 26,,, | Performed by: RADIOLOGY

## 2020-08-10 PROCEDURE — 72148 MRI LUMBAR SPINE W/O DYE: CPT | Mod: TC

## 2020-08-10 PROCEDURE — 72148 MRI LUMBAR SPINE W/O DYE: CPT | Mod: 26,,, | Performed by: RADIOLOGY

## 2020-08-11 ENCOUNTER — TELEPHONE (OUTPATIENT)
Dept: ORTHOPEDICS | Facility: CLINIC | Age: 80
End: 2020-08-11

## 2020-08-11 NOTE — TELEPHONE ENCOUNTER
Lm for pt to call back in regards to her MRI results.    ----- Message from Last Aquino MD sent at 8/10/2020 10:46 AM CDT -----  Please call pt.  The MRI confirmed the pain was coming from her back.  Please schedule with the spine team.  GC

## 2020-09-24 ENCOUNTER — TELEPHONE (OUTPATIENT)
Dept: ORTHOPEDICS | Facility: CLINIC | Age: 80
End: 2020-09-24

## 2020-09-24 DIAGNOSIS — M51.36 DDD (DEGENERATIVE DISC DISEASE), LUMBAR: Primary | ICD-10-CM

## 2020-10-05 ENCOUNTER — HOSPITAL ENCOUNTER (OUTPATIENT)
Dept: RADIOLOGY | Facility: HOSPITAL | Age: 80
Discharge: HOME | End: 2020-10-05
Attending: PHYSICIAN ASSISTANT
Payer: MEDICARE

## 2020-10-05 ENCOUNTER — OFFICE VISIT (OUTPATIENT)
Dept: ORTHOPEDICS | Facility: CLINIC | Age: 80
End: 2020-10-05
Payer: MEDICARE

## 2020-10-05 VITALS — HEIGHT: 62 IN | WEIGHT: 150.56 LBS | BODY MASS INDEX: 27.7 KG/M2

## 2020-10-05 DIAGNOSIS — M51.36 DDD (DEGENERATIVE DISC DISEASE), LUMBAR: ICD-10-CM

## 2020-10-05 DIAGNOSIS — M43.16 SPONDYLOLISTHESIS OF LUMBAR REGION: Primary | ICD-10-CM

## 2020-10-05 DIAGNOSIS — M48.062 SPINAL STENOSIS OF LUMBAR REGION WITH NEUROGENIC CLAUDICATION: ICD-10-CM

## 2020-10-05 PROCEDURE — 72120 X-RAY BEND ONLY L-S SPINE: CPT | Mod: 26,,, | Performed by: RADIOLOGY

## 2020-10-05 PROCEDURE — 72100 XR LUMBAR SPINE AP AND LAT WITH FLEX/EXT: ICD-10-PCS | Mod: 26,,, | Performed by: RADIOLOGY

## 2020-10-05 PROCEDURE — 99999 PR PBB SHADOW E&M-EST. PATIENT-LVL III: ICD-10-PCS | Mod: PBBFAC,,, | Performed by: PHYSICIAN ASSISTANT

## 2020-10-05 PROCEDURE — 72100 X-RAY EXAM L-S SPINE 2/3 VWS: CPT | Mod: 26,,, | Performed by: RADIOLOGY

## 2020-10-05 PROCEDURE — 99213 OFFICE O/P EST LOW 20 MIN: CPT | Mod: PBBFAC,25 | Performed by: PHYSICIAN ASSISTANT

## 2020-10-05 PROCEDURE — 99214 PR OFFICE/OUTPT VISIT, EST, LEVL IV, 30-39 MIN: ICD-10-PCS | Mod: S$PBB,,, | Performed by: PHYSICIAN ASSISTANT

## 2020-10-05 PROCEDURE — 72120 X-RAY BEND ONLY L-S SPINE: CPT | Mod: TC

## 2020-10-05 PROCEDURE — 72120 XR LUMBAR SPINE AP AND LAT WITH FLEX/EXT: ICD-10-PCS | Mod: 26,,, | Performed by: RADIOLOGY

## 2020-10-05 PROCEDURE — 99214 OFFICE O/P EST MOD 30 MIN: CPT | Mod: S$PBB,,, | Performed by: PHYSICIAN ASSISTANT

## 2020-10-05 PROCEDURE — 99999 PR PBB SHADOW E&M-EST. PATIENT-LVL III: CPT | Mod: PBBFAC,,, | Performed by: PHYSICIAN ASSISTANT

## 2020-10-05 RX ORDER — GABAPENTIN 100 MG/1
100 CAPSULE ORAL 3 TIMES DAILY
Qty: 90 CAPSULE | Refills: 0 | Status: SHIPPED | OUTPATIENT
Start: 2020-10-05 | End: 2023-02-08 | Stop reason: ALTCHOICE

## 2020-10-05 NOTE — PROGRESS NOTES
DATE: 10/5/2020  PATIENT: Aga Rodriges    Supervising Physician: David Chowdary M.D.    CHIEF COMPLAINT: low back and bilateral leg pain    HISTORY:  Aga Rodriges is a 80 y.o. female here for initial evaluation of low back and bilateral leg pain (Back - 6, Leg - 8).  The pain in the L hip/leg is what bothers her most.  The pain has been present for years, worsening in the past few months. The patient describes the pain as aching.  The pain is worse with sitting and improved by stretching and home massager. There is no associated numbness and tingling. There is positive subjective weakness and pt feels like sometimes she has to make a point to lift her left leg when walking. Prior treatments have included NSAIDS (relieve back pain but not leg pain), but no PT, ERIC, surgery.    The patient denies myelopathic symptoms such as handwriting changes or difficulty with buttons/coins/keys. Denies perineal paresthesias, bowel/bladder dysfunction.    PAST MEDICAL/SURGICAL HISTORY:  Past Medical History:   Diagnosis Date    Acid reflux     Hypertension      Past Surgical History:   Procedure Laterality Date    CARPAL TUNNEL RELEASE Left     HYSTERECTOMY      PERCUTANEOUS CRYOTHERAPY OF PERIPHERAL NERVE USING LIQUID NITROUS OXIDE IN CLOSED NEEDLE DEVICE Left 9/27/2018    Procedure: CRYOTHERAPY, NERVE, PERIPHERAL, PERCUTANEOUS, USING LIQUID NITROUS OXIDE IN CLOSED NEEDLE DEVICE;  Surgeon: KANA Sullivan;  Location: [facility];  Service: Orthopedics;  Laterality: Left;       Medications:   Current Outpatient Medications on File Prior to Visit   Medication Sig Dispense Refill    acetaminophen (TYLENOL) 650 MG TbSR Take 650 mg by mouth every 8 (eight) hours.      amLODIPine (NORVASC) 10 MG tablet Take 10 mg by mouth once daily.      aspirin (ECOTRIN) 81 MG EC tablet Take 1 tablet (81 mg total) by mouth 2 (two) times daily. 84 tablet 0    ATIVAN 1 mg tablet 1 mg as needed.       atorvastatin  (LIPITOR) 10 MG tablet 10 mg once daily.       cloNIDine 0.2 mg/24 hr td ptwk (CATAPRES) 0.2 mg/24 hr Place onto the skin once a week.       COZAAR 100 mg tablet 100 mg 2 (two) times daily.       ergocalciferol (VITAMIN D2) 50,000 unit Cap Take 50,000 Units by mouth every 7 days.      famotidine (PEPCID) 20 MG tablet Take 1 tablet (20 mg total) by mouth 2 (two) times daily. 84 tablet 0    omeprazole (PRILOSEC) 20 MG capsule 20 mg once daily.       ondansetron (ZOFRAN) 4 MG tablet Take 1 tablet (4 mg total) by mouth every 6 (six) hours as needed for Nausea. (Patient not taking: Reported on 8/5/2020) 12 tablet 0    timolol maleate 0.5% (TIMOPTIC) 0.5 % Drop 1 drop every evening.       No current facility-administered medications on file prior to visit.        Social History:   Social History     Socioeconomic History    Marital status: Single     Spouse name: Not on file    Number of children: Not on file    Years of education: Not on file    Highest education level: Not on file   Occupational History    Not on file   Social Needs    Financial resource strain: Not on file    Food insecurity     Worry: Not on file     Inability: Not on file    Transportation needs     Medical: Not on file     Non-medical: Not on file   Tobacco Use    Smoking status: Never Smoker    Smokeless tobacco: Never Used   Substance and Sexual Activity    Alcohol use: Yes     Frequency: 2-3 times a week     Drinks per session: 1 or 2     Binge frequency: Never     Comment: socially    Drug use: No    Sexual activity: Not on file   Lifestyle    Physical activity     Days per week: Not on file     Minutes per session: Not on file    Stress: Not on file   Relationships    Social connections     Talks on phone: Not on file     Gets together: Not on file     Attends Anabaptist service: Not on file     Active member of club or organization: Not on file     Attends meetings of clubs or organizations: Not on file     Relationship  "status: Not on file   Other Topics Concern    Not on file   Social History Narrative    Not on file       REVIEW OF SYSTEMS:  Constitution: Negative. Negative for chills, fever and night sweats.   Cardiovascular: Negative for chest pain and syncope.   Respiratory: Negative for cough and shortness of breath.   Gastrointestinal: See HPI. Negative for nausea/vomiting. Negative for abdominal pain.  Genitourinary: See HPI. Negative for discoloration or dysuria.  Skin: Negative for dry skin, itching and rash.   Hematologic/Lymphatic: Negative for bleeding problem. Does not bruise/bleed easily.   Musculoskeletal: Negative for falls and muscle weakness.   Neurological: See HPI. No seizures.   Endocrine: Negative for polydipsia, polyphagia and polyuria.   Allergic/Immunologic: Negative for hives and persistent infections.     EXAM:  Ht 5' 2" (1.575 m)   Wt 68.3 kg (150 lb 9.2 oz)   BMI 27.54 kg/m²     General: The patient is a very pleasant 80 y.o. female in no apparent distress, the patient is oriented to person, place and time.  Psych: Normal mood and affect  HEENT: Vision grossly intact, hearing intact to the spoken word.  Lungs: Respirations unlabored.  Gait: Normal station and gait, no difficulty with toe or heel walk.   Skin: Dorsal lumbar skin negative for rashes, lesions, hairy patches and surgical scars. There is no lumbar tenderness to palpation.  Range of motion: Lumbar range of motion is acceptable.  Spinal Balance: Global saggital and coronal spinal balance acceptable, not significant for scoliosis and kyphosis.  Musculoskeletal: No pain with the range of motion of the bilateral hips. No trochanteric tenderness to palpation.  Vascular: Bilateral lower extremities warm and well perfused, dorsalis pedis pulses 2+ bilaterally.  Neurological: Normal strength and tone in all major motor groups in the bilateral lower extremities. Normal sensation to light touch in the L2-S1 dermatomes bilaterally.  Deep tendon " reflexes symmetric 2+ in the bilateral lower extremities.  Negative Babinski bilaterally. Straight leg raise negative bilaterally.    IMAGING:      Today I personally reviewed AP, Lat and Flex/Ex  upright L-spine films that demonstrate grade I anterolisthesis at L4/5 and L5/S1.    MRI lumbar spine demonstrates moderate stenosis at L4/5 and L5/S1.      Body mass index is 27.54 kg/m².    Hemoglobin A1C   Date Value Ref Range Status   09/06/2018 5.3 4.0 - 5.6 % Final     Comment:     ADA Screening Guidelines:  5.7-6.4%  Consistent with prediabetes  >or=6.5%  Consistent with diabetes  High levels of fetal hemoglobin interfere with the HbA1C  assay. Heterozygous hemoglobin variants (HbS, HgC, etc)do  not significantly interfere with this assay.   However, presence of multiple variants may affect accuracy.             ASSESSMENT/PLAN:    Aga was seen today for low-back pain.    Diagnoses and all orders for this visit:    Spondylolisthesis of lumbar region    Spinal stenosis of lumbar region with neurogenic claudication    Other orders  -     gabapentin (NEURONTIN) 100 MG capsule; Take 1 capsule (100 mg total) by mouth 3 (three) times daily.        Today we discussed at length all of the different treatment options including anti-inflammatories, acetaminophen, rest, ice, heat, physical therapy including strengthening and stretching exercises, home exercises, ROM, aerobic conditioning, aqua therapy, other modalities including ultrasound, massage, and dry needling, epidural steroid injections and finally surgical intervention.      Provided pt with home exercises and prescribed Gabapentin 100mg. Pt will f/u as needed.

## 2020-10-05 NOTE — LETTER
October 5, 2020      Last Aquino MD  [address]  [facility] [address]           [id number]  [address]  [facility] [address]  Phone: [phone]          Patient: Aga Rodriges   MR Number: [id number]   YOB: 1940   Date of Visit: 10/5/2020       Dear Dr. Last Aquino:    Thank you for referring Aga Rodriges to me for evaluation. Attached you will find relevant portions of my assessment and plan of care.    If you have questions, please do not hesitate to call me. I look forward to following Aga Rodriges along with you.    Sincerely,    Stephanie Nazario PA-C    Enclosure  CC:  No Recipients    If you would like to receive this communication electronically, please contact [email or web address] or [phone] to request more information on [employer] access.    For providers and/or their staff who would like to refer a patient to [facility], please contact us through our one-stop-shop provider referral line, [facility], at [phone].    If you feel you have received this communication in error or would no longer like to receive these types of communications, please e-mail [email or web address]

## 2021-04-19 ENCOUNTER — IMMUNIZATION (OUTPATIENT)
Dept: PRIMARY CARE CLINIC | Facility: CLINIC | Age: 81
End: 2021-04-19
Payer: MEDICARE

## 2021-04-19 DIAGNOSIS — Z23 NEED FOR VACCINATION: Primary | ICD-10-CM

## 2021-04-19 PROCEDURE — 0001A COVID-19, MRNA, LNP-S, PF, 30 MCG/0.3 ML DOSE VACCINE: ICD-10-PCS | Mod: CV19,S$GLB,, | Performed by: FAMILY MEDICINE

## 2021-04-19 PROCEDURE — 91300 COVID-19, MRNA, LNP-S, PF, 30 MCG/0.3 ML DOSE VACCINE: CPT | Mod: S$GLB,,, | Performed by: FAMILY MEDICINE

## 2021-04-19 PROCEDURE — 0001A COVID-19, MRNA, LNP-S, PF, 30 MCG/0.3 ML DOSE VACCINE: CPT | Mod: CV19,S$GLB,, | Performed by: FAMILY MEDICINE

## 2021-04-19 PROCEDURE — 91300 COVID-19, MRNA, LNP-S, PF, 30 MCG/0.3 ML DOSE VACCINE: ICD-10-PCS | Mod: S$GLB,,, | Performed by: FAMILY MEDICINE

## 2021-05-10 ENCOUNTER — IMMUNIZATION (OUTPATIENT)
Dept: PRIMARY CARE CLINIC | Facility: CLINIC | Age: 81
End: 2021-05-10
Payer: MEDICARE

## 2021-05-10 DIAGNOSIS — Z23 NEED FOR VACCINATION: Primary | ICD-10-CM

## 2021-05-10 PROCEDURE — 91300 COVID-19, MRNA, LNP-S, PF, 30 MCG/0.3 ML DOSE VACCINE: ICD-10-PCS | Mod: S$GLB,,, | Performed by: EMERGENCY MEDICINE

## 2021-05-10 PROCEDURE — 0002A COVID-19, MRNA, LNP-S, PF, 30 MCG/0.3 ML DOSE VACCINE: ICD-10-PCS | Mod: CV19,S$GLB,, | Performed by: EMERGENCY MEDICINE

## 2021-05-10 PROCEDURE — 91300 COVID-19, MRNA, LNP-S, PF, 30 MCG/0.3 ML DOSE VACCINE: CPT | Mod: S$GLB,,, | Performed by: EMERGENCY MEDICINE

## 2021-05-10 PROCEDURE — 0002A COVID-19, MRNA, LNP-S, PF, 30 MCG/0.3 ML DOSE VACCINE: CPT | Mod: CV19,S$GLB,, | Performed by: EMERGENCY MEDICINE

## 2022-06-24 ENCOUNTER — IMMUNIZATION (OUTPATIENT)
Dept: PRIMARY CARE CLINIC | Facility: CLINIC | Age: 82
End: 2022-06-24
Payer: MEDICARE

## 2022-06-24 DIAGNOSIS — Z23 NEED FOR VACCINATION: Primary | ICD-10-CM

## 2022-06-24 PROCEDURE — 91305 COVID-19, MRNA, LNP-S, PF, 30 MCG/0.3 ML DOSE VACCINE (PFIZER): CPT | Mod: PBBFAC,PN

## 2022-11-11 ENCOUNTER — IMMUNIZATION (OUTPATIENT)
Dept: PRIMARY CARE CLINIC | Facility: CLINIC | Age: 82
End: 2022-11-11
Payer: MEDICARE

## 2022-11-11 DIAGNOSIS — Z23 NEED FOR VACCINATION: Primary | ICD-10-CM

## 2022-11-11 PROCEDURE — 0124A COVID-19, MRNA, LNP-S, BIVALENT BOOSTER, PF, 30 MCG/0.3 ML DOSE: CPT | Mod: PBBFAC,PN

## 2022-11-11 PROCEDURE — 91312 COVID-19, MRNA, LNP-S, BIVALENT BOOSTER, PF, 30 MCG/0.3 ML DOSE: CPT | Mod: PBBFAC,PN

## 2022-12-11 ENCOUNTER — HOSPITAL ENCOUNTER (EMERGENCY)
Facility: HOSPITAL | Age: 82
Discharge: HOME | End: 2022-12-11
Attending: EMERGENCY MEDICINE
Payer: MEDICARE

## 2022-12-11 VITALS
WEIGHT: 140 LBS | DIASTOLIC BLOOD PRESSURE: 94 MMHG | TEMPERATURE: 98 F | SYSTOLIC BLOOD PRESSURE: 204 MMHG | OXYGEN SATURATION: 99 % | HEART RATE: 90 BPM | BODY MASS INDEX: 26.43 KG/M2 | HEIGHT: 61 IN | RESPIRATION RATE: 18 BRPM

## 2022-12-11 DIAGNOSIS — R42 DIZZINESS: ICD-10-CM

## 2022-12-11 DIAGNOSIS — H81.399 PERIPHERAL VERTIGO, UNSPECIFIED LATERALITY: Primary | ICD-10-CM

## 2022-12-11 LAB — POCT GLUCOSE: 97 MG/DL (ref 70–110)

## 2022-12-11 PROCEDURE — 93010 ELECTROCARDIOGRAM REPORT: CPT | Mod: ,,, | Performed by: INTERNAL MEDICINE

## 2022-12-11 PROCEDURE — 25000003 PHARM REV CODE 250: Performed by: EMERGENCY MEDICINE

## 2022-12-11 PROCEDURE — 82962 GLUCOSE BLOOD TEST: CPT

## 2022-12-11 PROCEDURE — 93005 ELECTROCARDIOGRAM TRACING: CPT

## 2022-12-11 PROCEDURE — 99284 PR EMERGENCY DEPT VISIT,LEVEL IV: ICD-10-PCS | Mod: ,,, | Performed by: EMERGENCY MEDICINE

## 2022-12-11 PROCEDURE — 99284 EMERGENCY DEPT VISIT MOD MDM: CPT | Mod: ,,, | Performed by: EMERGENCY MEDICINE

## 2022-12-11 PROCEDURE — 99285 EMERGENCY DEPT VISIT HI MDM: CPT | Mod: 25

## 2022-12-11 PROCEDURE — 93010 EKG 12-LEAD: ICD-10-PCS | Mod: ,,, | Performed by: INTERNAL MEDICINE

## 2022-12-11 RX ORDER — DIAZEPAM 5 MG/1
5 TABLET ORAL
Status: COMPLETED | OUTPATIENT
Start: 2022-12-11 | End: 2022-12-11

## 2022-12-11 RX ORDER — ACETAMINOPHEN 500 MG
1000 TABLET ORAL
Status: COMPLETED | OUTPATIENT
Start: 2022-12-11 | End: 2022-12-11

## 2022-12-11 RX ORDER — MECLIZINE HCL 12.5 MG 12.5 MG/1
25 TABLET ORAL
Status: DISCONTINUED | OUTPATIENT
Start: 2022-12-11 | End: 2022-12-11

## 2022-12-11 RX ORDER — LORAZEPAM 1 MG/1
1 TABLET ORAL
Status: DISCONTINUED | OUTPATIENT
Start: 2022-12-11 | End: 2022-12-11

## 2022-12-11 RX ADMIN — ACETAMINOPHEN 1000 MG: 500 TABLET ORAL at 11:12

## 2022-12-11 RX ADMIN — DIAZEPAM 5 MG: 5 TABLET ORAL at 11:12

## 2022-12-11 NOTE — ED PROVIDER NOTES
"Encounter Date: 12/11/2022       History     Chief Complaint   Patient presents with    Dizziness     PT states, "I am feelings dizzy, woozy and pain in the L side of my head" x1 week.      82-year-old female, with history of vertigo, presents to the ED for dizziness.  Patient stated she had persistent dizziness for 1 week.  She was seen at [facility] a week ago with unremarkable workup including troponin and CT head, symptom was treated with meclizine.  She has ENT appointment tomorrow for vertigo, she was told to start taking meclizine for 48 hours prior to the appointment.  Patient states that her dizziness return last night, however, states that it feels different, reports left-sided head pain, "cloudy head" sensation, decreased urine, and tinnitus.  Denies nausea, vomiting, no visual change, no head trauma.      Review of patient's allergies indicates:  No Known Allergies  Past Medical History:   Diagnosis Date    Acid reflux     Hypertension      Past Surgical History:   Procedure Laterality Date    CARPAL TUNNEL RELEASE Left     HYSTERECTOMY      PERCUTANEOUS CRYOTHERAPY OF PERIPHERAL NERVE USING LIQUID NITROUS OXIDE IN CLOSED NEEDLE DEVICE Left 9/27/2018    Procedure: CRYOTHERAPY, NERVE, PERIPHERAL, PERCUTANEOUS, USING LIQUID NITROUS OXIDE IN CLOSED NEEDLE DEVICE;  Surgeon: KANA Sullivan;  Location: [facility] OR;  Service: Orthopedics;  Laterality: Left;     Family History   Problem Relation Age of Onset    Other Mother         enlarged heart    Cancer Sister     Emphysema Brother      Social History     Tobacco Use    Smoking status: Never    Smokeless tobacco: Never   Substance Use Topics    Alcohol use: Yes     Comment: socially    Drug use: No     Review of Systems   Constitutional:  Negative for chills and fever.   HENT:  Negative for congestion and sore throat.         Ringing ear   Eyes:  Negative for pain and visual disturbance.   Respiratory:  Negative for cough and shortness of breath.  "   Cardiovascular:  Negative for chest pain and leg swelling.   Gastrointestinal:  Negative for abdominal pain, nausea and vomiting.   Genitourinary:  Negative for dysuria and flank pain.   Musculoskeletal:  Negative for back pain and neck pain.   Skin:  Negative for rash.   Neurological:  Positive for dizziness and headaches. Negative for weakness and numbness.   Psychiatric/Behavioral:  Negative for behavioral problems and confusion.      Physical Exam     Initial Vitals [12/11/22 1044]   BP Pulse Resp Temp SpO2   (!) 204/94 90 18 97.6 °F (36.4 °C) 99 %      MAP       --         Physical Exam    Nursing note and vitals reviewed.  Constitutional: She appears well-developed. No distress.   HENT:   Head: Normocephalic and atraumatic.   Mouth/Throat: Oropharynx is clear and moist.   Eyes: Conjunctivae and EOM are normal. Pupils are equal, round, and reactive to light.   Neck: No JVD present.   Normal range of motion.  Cardiovascular:  Normal rate, regular rhythm, normal heart sounds and intact distal pulses.           Pulmonary/Chest: Breath sounds normal. No respiratory distress.   Abdominal: Abdomen is soft. She exhibits no distension. There is no abdominal tenderness.   Musculoskeletal:         General: No tenderness or edema. Normal range of motion.      Cervical back: Normal range of motion.     Neurological: She is alert and oriented to person, place, and time. She has normal strength. No cranial nerve deficit or sensory deficit.   fatigable nystagmus toward the right, no school deviation, positive head impulse test.  No dysrhythmia, no ataxia, no focal weakness   Skin: Skin is warm and dry. Capillary refill takes less than 2 seconds.       ED Course   Procedures  Labs Reviewed   HIV 1 / 2 ANTIBODY   HEPATITIS C ANTIBODY   POCT GLUCOSE MONITORING CONTINUOUS          Imaging Results              MRI Brain Without Contrast (Final result)  Result time 12/11/22 14:00:06      Final result by Shaquille Harper DO  (12/11/22 14:00:06)                   Impression:      Few scattered punctate foci of T2 FLAIR signal hyperintensity supratentorial white matter while nonspecific suggestive for mild chronic ischemic change.    Otherwise unremarkable noncontrast MRI brain specifically without evidence for hydrocephalus or acute infarction.      Electronically signed by: Shaquille Harper DO  Date:    12/11/2022  Time:    14:00               Narrative:    EXAMINATION:  MRI BRAIN WITHOUT CONTRAST    CLINICAL HISTORY:  Dizziness, persistent/recurrent, cardiac or vascular cause suspected;    TECHNIQUE:  Sagittal and axial T1, axial T2, axial FLAIR, axial gradient and axial diffusion imaging of the whole brain without contrast.    COMPARISON:  None    FINDINGS:  Several scattered punctate foci of T2 FLAIR signal hyperintensity within the supratentorial white matter without corresponding diffusion signal abnormality.  These are nonspecific and may represent mild chronic ischemic change.  There is no restricted diffusion to suggest acute or recent infarction.  No midline shift or mass effect.  Major intracranial skull base T2 flow voids are present.  Remote operative change bilateral cataract repair.  No abnormal parenchymal susceptibility to suggest parenchymal hemorrhage with slight distortion of the examination overlying the left parietal lobe secondary to presumed artifact from extrinsic metal or device.  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                                       Medications   acetaminophen tablet 1,000 mg (1,000 mg Oral Given 12/11/22 1143)   diazePAM tablet 5 mg (5 mg Oral Given 12/11/22 1157)     Medical Decision Making:   History:   Old Medical Records: I decided to obtain old medical records.  Initial Assessment:   82-year-old female, with history of vertigo, presents to the ED for dizziness.  Patient is alert and oriented, dizziness is episodic, and positional, hints exam  negative, no focal neurological deficits, no ataxia.  Vital signs reviewed, hypertensive, but otherwise stable  Differential Diagnosis:   BPPV, labyrinthitis, vestibular neuritis, hypoglycemia, arrhythmia, CVA           ED Course as of 12/11/22 1419   Sun Dec 11, 2022   1215 Glucose 98 [NC]   1218 EKG independent interpretation:  Rate 63, regular, no ST segment change, normal axis, normal intervals, overall: normal sinus rhythm [NC]   1406 MRI Brain Without Contrast  No acute stroke or intracranial mass [NC]   1419 Patient will have her ENT follow-up tomorrow for her vertigo.  On re-examination patient states that her symptom has been resolved.  She is stable to be discharged home, provided discharge instructions and return to the ED precaution.  No other questions. [NC]      ED Course User Index  [NC] Tom Braden MD                 Clinical Impression:   Final diagnoses:  [R42] Dizziness  [H81.399] Peripheral vertigo, unspecified laterality (Primary)        ED Disposition Condition    Discharge Stable          ED Prescriptions    None       Follow-up Information       Follow up With Specialties Details Why Contact Info    Neno Avina MD Internal Medicine In 1 week  [address] [facility] [address]  [phone]      ENT  Schedule an appointment as soon as possible for a visit       [facility] Emergency Dept Emergency Medicine  As needed [address]  [place] [address] [phone]             Tom Braden MD  Resident  12/11/22 1252

## 2022-12-11 NOTE — DISCHARGE INSTRUCTIONS
Please follow-up with your ENT tomorrow.  Avoid walking or driving when you feel dizzy, avoid falling.  Follow-up with your primary care provider within 1 week.  Return to the ED if you have lost consciousness, chest pain, short of breath, or other concerns.

## 2022-12-11 NOTE — ED TRIAGE NOTES
Pt present to ED via personal transport with c/o dizziness, feeling ''off balance and woozy'' x 1 week. Pt recently seen in ED for same thing, sent home with meclizine prescription. Pt hx HTN, hypertensive in triage.

## 2022-12-11 NOTE — ED NOTES
Pt verbalized understanding of physician teaching. All d/c paperwork and prescriptions in hand. No needs observed or expressed at this time. Ambulatory to exit.

## 2023-02-01 ENCOUNTER — TELEPHONE (OUTPATIENT)
Dept: NEUROLOGY | Facility: CLINIC | Age: 83
End: 2023-02-01
Payer: MEDICARE

## 2023-02-01 NOTE — TELEPHONE ENCOUNTER
LVM offering appt for vertigo/dizziness. Direct number given.  Please schedule if pt desires and send message if she requests sooner appt

## 2023-02-08 ENCOUNTER — OFFICE VISIT (OUTPATIENT)
Dept: NEUROLOGY | Facility: CLINIC | Age: 83
End: 2023-02-08
Payer: MEDICARE

## 2023-02-08 VITALS
WEIGHT: 149.94 LBS | HEART RATE: 75 BPM | HEIGHT: 61 IN | BODY MASS INDEX: 28.31 KG/M2 | SYSTOLIC BLOOD PRESSURE: 133 MMHG | DIASTOLIC BLOOD PRESSURE: 79 MMHG

## 2023-02-08 DIAGNOSIS — R42 DIZZINESS: ICD-10-CM

## 2023-02-08 DIAGNOSIS — F41.9 ANXIETY: Primary | ICD-10-CM

## 2023-02-08 PROCEDURE — 99213 OFFICE O/P EST LOW 20 MIN: CPT | Mod: PBBFAC

## 2023-02-08 PROCEDURE — 99999 PR PBB SHADOW E&M-EST. PATIENT-LVL III: ICD-10-PCS | Mod: PBBFAC,GC,,

## 2023-02-08 PROCEDURE — 99204 PR OFFICE/OUTPT VISIT, NEW, LEVL IV, 45-59 MIN: ICD-10-PCS | Mod: S$PBB,GC,,

## 2023-02-08 PROCEDURE — 99204 OFFICE O/P NEW MOD 45 MIN: CPT | Mod: S$PBB,GC,,

## 2023-02-08 PROCEDURE — 99999 PR PBB SHADOW E&M-EST. PATIENT-LVL III: CPT | Mod: PBBFAC,GC,,

## 2023-02-08 RX ORDER — ESCITALOPRAM OXALATE 10 MG/1
5 TABLET ORAL DAILY
Qty: 15 TABLET | Refills: 2 | Status: SHIPPED | OUTPATIENT
Start: 2023-02-08 | End: 2024-02-08

## 2023-02-08 RX ORDER — PANTOPRAZOLE SODIUM 40 MG/1
40 TABLET, DELAYED RELEASE ORAL
COMMUNITY
Start: 2023-01-25

## 2023-02-08 RX ORDER — FLUTICASONE PROPIONATE 50 MCG
2 SPRAY, SUSPENSION (ML) NASAL
COMMUNITY
Start: 2023-01-17

## 2023-02-08 RX ORDER — ATORVASTATIN CALCIUM 20 MG/1
20 TABLET, FILM COATED ORAL
COMMUNITY
Start: 2023-01-25

## 2023-02-08 RX ORDER — LOSARTAN POTASSIUM 100 MG/1
100 TABLET ORAL
COMMUNITY
Start: 2022-10-11

## 2023-02-08 NOTE — PROGRESS NOTES
"[facility] - NEUROLOGY 7TH FL [facility], [place]    Date: 2/8/23  Patient Name: Aga Rodriges   MRN: [id number]   PCP: Neno Avina  Referring Provider: Self, Aaareferral    Assessment:   Aga Rodriges is a 83 y.o. female presenting for follow up of vertigo and balance issues. She is has a history of vertigo, and recently presented to the ED for dizziness.  Patient had persistent dizziness for 1 week [place] [facility] [place]  [place] that, she was seen at [facility] with an unremarkable workup including troponin and CT head, symptom was treated with meclizine. She had an ENT appointment recently as well for vertigo. She also had left-sided head pain, "cloudy head" sensation, decreased urine, and tinnitus. Patient denies nausea, vomiting, visual change, or head trauma. Today she presents for follow up on these issues.    Patient reports that her dizziness has improved quite a bit, as well as her balance issues, with balance issues improving after starting physical therapy. She was seen by Dr. Quesada, an ENT specialist. She was referred to me by Dr Cohen, her primary care provider.    Her only reported symptom currently is fogginess. She also reports anxiety, for which we've just started her on a low dose lexapro. She does not appear to have any focal neurological deficits or neurological abnormalities given that her symptoms are largely absent and not persistent. I did note an asymmetric smile but this proved to have been present previously judging by her chart photo as well as her 's license.    If her symptoms return or she develops new neurological deficits, I'd be happy to revisit the issue and evaluate need for further work up. Please feel free to refer her to us again if any symptoms develop or worsen.    Plan:     Problem List Items Addressed This Visit    None  Visit Diagnoses       Anxiety    -  Primary    Relevant Medications    EScitalopram " "oxalate (LEXAPRO) 10 MG tablet    Dizziness                02/09/2023  Misael Mayfield MD, [employer]  Neurology resident, PGY-2  Department of Neurology  [facility]      Subjective:     Patient seen for evaluation of vertigo and balance issues.     HPI:   Ms. Aga Rodriges is a 83 y.o. female presenting with vertigo, balance issues. She is has a history of vertigo, and recently presented to the ED for dizziness.  Patient had persistent dizziness for 1 week prior to the ED visit at [facility] [place].  Before that, she was seen at [facility] with an unremarkable workup including troponin and CT head, symptom was treated with meclizine. She had an ENT appointment recently as well for vertigo. She also had left-sided head pain, "cloudy head" sensation, decreased urine, and tinnitus. Patient denies nausea, vomiting, visual change, or head trauma. Today she presents for follow up on these issues.    PAST MEDICAL HISTORY:  Past Medical History:   Diagnosis Date    Acid reflux     Hypertension        PAST SURGICAL HISTORY:  Past Surgical History:   Procedure Laterality Date    CARPAL TUNNEL RELEASE Left     HYSTERECTOMY      PERCUTANEOUS CRYOTHERAPY OF PERIPHERAL NERVE USING LIQUID NITROUS OXIDE IN CLOSED NEEDLE DEVICE Left 9/27/2018    Procedure: CRYOTHERAPY, NERVE, PERIPHERAL, PERCUTANEOUS, USING LIQUID NITROUS OXIDE IN CLOSED NEEDLE DEVICE;  Surgeon: KANA Sullivan;  Location: [facility] OR;  Service: Orthopedics;  Laterality: Left;       CURRENT MEDS:  Current Outpatient Medications   Medication Sig Dispense Refill    acetaminophen (TYLENOL) 650 MG TbSR Take 650 mg by mouth every 8 (eight) hours.      amLODIPine (NORVASC) 10 MG tablet Take 10 mg by mouth once daily.      aspirin (ECOTRIN) 81 MG EC tablet Take 1 tablet (81 mg total) by mouth 2 (two) times daily. 84 tablet 0    ATIVAN 1 mg tablet 1 mg as needed.       atorvastatin (LIPITOR) 20 MG tablet Take 20 mg by mouth.      COZAAR 100 mg tablet 100 mg 2 (two) times " "daily.       ergocalciferol (ERGOCALCIFEROL) 50,000 unit Cap Take 50,000 Units by mouth every 7 days.      fluticasone propionate (FLONASE) 50 mcg/actuation nasal spray 2 sprays by Each Nostril route.      losartan (COZAAR) 100 MG tablet Take 100 mg by mouth.      pantoprazole (PROTONIX) 40 MG tablet Take 40 mg by mouth.      timolol maleate 0.5% (TIMOPTIC) 0.5 % Drop 1 drop every evening.      EScitalopram oxalate (LEXAPRO) 10 MG tablet Take 0.5 tablets (5 mg total) by mouth once daily. 15 tablet 2     No current facility-administered medications for this visit.       ALLERGIES:  Review of patient's allergies indicates:  No Known Allergies    FAMILY HISTORY:  Family History   Problem Relation Age of Onset    Other Mother         enlarged heart    Cancer Sister     Emphysema Brother        SOCIAL HISTORY:  Social History     Tobacco Use    Smoking status: Never    Smokeless tobacco: Never   Substance Use Topics    Alcohol use: Yes     Comment: socially    Drug use: No       Review of Systems:  12 system review of systems is negative except for the symptoms mentioned in HPI.        Objective:     Vitals:    02/08/23 1502   BP: 133/79   Pulse: 75   Weight: 68 kg (149 lb 14.6 oz)   Height: 5' 1" (1.549 m)     General: NAD, well nourished   Eyes: no tearing, discharge, no erythema   ENT: moist mucous membranes of the oral cavity, nares patent    Neck: Supple, full range of motion  Cardiovascular: Warm and well perfused, pulses equal and symmetrical  Lungs: Normal work of breathing, normal chest wall excursions  Skin: No rash, lesions, or breakdown on exposed skin  Psychiatry: Mood and affect are appropriate   Abdomen: soft, non tender, non distended  Extremeties: No cyanosis, clubbing or edema.    Neurological   MENTAL STATUS: Alert and oriented to person, place, and time. Attention and concentration within normal limits. Speech without dysarthria, able to name and repeat without difficulty. Recent and remote memory " within normal limits   CRANIAL NERVES: Visual fields intact. PERRL. EOMI. Facial sensation intact. Face symmetrical. Hearing grossly intact. Full shoulder shrug bilaterally. Tongue protrudes midline   SENSORY: Sensation is intact to pin, light touch, and vibration throughout.  Joint position perception intact. Negative Romberg.   MOTOR: Normal bulk and tone. No pronator drift.  5/5 deltoid, biceps, triceps, interosseous, hand  bilaterally. 5/5 iliopsoas, knee extension/flexion, foot dorsi/plantarflexion bilaterally.    REFLEXES: Symmetric and 2+ throughout. Toes down going bilaterally.   CEREBELLAR/COORDINATION/GAIT: Gait steady with normal arm swing and stride length.  Heel to shin intact. Finger to nose intact. Normal rapid alternating movements.

## 2024-02-21 ENCOUNTER — TELEPHONE (OUTPATIENT)
Dept: NEPHROLOGY | Facility: CLINIC | Age: 84
End: 2024-02-21
Payer: MEDICARE

## 2024-02-21 DIAGNOSIS — D63.1 ANEMIA IN CHRONIC KIDNEY DISEASE, UNSPECIFIED CKD STAGE: Primary | ICD-10-CM

## 2024-02-21 DIAGNOSIS — N18.9 ANEMIA IN CHRONIC KIDNEY DISEASE, UNSPECIFIED CKD STAGE: Primary | ICD-10-CM

## 2024-02-21 NOTE — TELEPHONE ENCOUNTER
----- Message from Felicia Patel MA sent at 2/21/2024  2:05 PM CST -----  Regarding: FW: pt advice  Contact: pt [phone]    ----- Message -----  From: Janet Mccabe  Sent: 2/21/2024   1:58 PM CST  To: [facility]  Subject: pt advice                                        Pt returning call from missed call. Pls call

## 2024-05-21 ENCOUNTER — LAB VISIT (OUTPATIENT)
Dept: LAB | Facility: HOSPITAL | Age: 84
End: 2024-05-21
Attending: INTERNAL MEDICINE
Payer: MEDICARE

## 2024-05-21 DIAGNOSIS — N18.9 ANEMIA IN CHRONIC KIDNEY DISEASE, UNSPECIFIED CKD STAGE: ICD-10-CM

## 2024-05-21 DIAGNOSIS — D63.1 ANEMIA IN CHRONIC KIDNEY DISEASE, UNSPECIFIED CKD STAGE: ICD-10-CM

## 2024-05-21 LAB
ALBUMIN SERPL BCP-MCNC: 4.1 G/DL (ref 3.5–5.2)
ANION GAP SERPL CALC-SCNC: 8 MMOL/L (ref 8–16)
BASOPHILS # BLD AUTO: 0.04 K/UL (ref 0–0.2)
BASOPHILS NFR BLD: 0.5 % (ref 0–1.9)
BUN SERPL-MCNC: 15 MG/DL (ref 8–23)
CALCIUM SERPL-MCNC: 10 MG/DL (ref 8.7–10.5)
CHLORIDE SERPL-SCNC: 100 MMOL/L (ref 95–110)
CHOLEST SERPL-MCNC: 179 MG/DL (ref 120–199)
CHOLEST/HDLC SERPL: 3.2 {RATIO} (ref 2–5)
CO2 SERPL-SCNC: 25 MMOL/L (ref 23–29)
CREAT SERPL-MCNC: 1.1 MG/DL (ref 0.5–1.4)
DIFFERENTIAL METHOD BLD: ABNORMAL
EOSINOPHIL # BLD AUTO: 0.2 K/UL (ref 0–0.5)
EOSINOPHIL NFR BLD: 2.8 % (ref 0–8)
ERYTHROCYTE [DISTWIDTH] IN BLOOD BY AUTOMATED COUNT: 13.6 % (ref 11.5–14.5)
EST. GFR  (NO RACE VARIABLE): 50.2 ML/MIN/1.73 M^2
GLUCOSE SERPL-MCNC: 104 MG/DL (ref 70–110)
HCT VFR BLD AUTO: 36 % (ref 37–48.5)
HDLC SERPL-MCNC: 56 MG/DL (ref 40–75)
HDLC SERPL: 31.3 % (ref 20–50)
HGB BLD-MCNC: 11.4 G/DL (ref 12–16)
IMM GRANULOCYTES # BLD AUTO: 0.02 K/UL (ref 0–0.04)
IMM GRANULOCYTES NFR BLD AUTO: 0.3 % (ref 0–0.5)
LDLC SERPL CALC-MCNC: 108.2 MG/DL (ref 63–159)
LYMPHOCYTES # BLD AUTO: 2.1 K/UL (ref 1–4.8)
LYMPHOCYTES NFR BLD: 27.9 % (ref 18–48)
MCH RBC QN AUTO: 27.7 PG (ref 27–31)
MCHC RBC AUTO-ENTMCNC: 31.7 G/DL (ref 32–36)
MCV RBC AUTO: 87 FL (ref 82–98)
MONOCYTES # BLD AUTO: 0.7 K/UL (ref 0.3–1)
MONOCYTES NFR BLD: 9.5 % (ref 4–15)
NEUTROPHILS # BLD AUTO: 4.5 K/UL (ref 1.8–7.7)
NEUTROPHILS NFR BLD: 59 % (ref 38–73)
NONHDLC SERPL-MCNC: 123 MG/DL
NRBC BLD-RTO: 0 /100 WBC
PHOSPHATE SERPL-MCNC: 3.2 MG/DL (ref 2.7–4.5)
PLATELET # BLD AUTO: 251 K/UL (ref 150–450)
PMV BLD AUTO: 12.3 FL (ref 9.2–12.9)
POTASSIUM SERPL-SCNC: 4 MMOL/L (ref 3.5–5.1)
PTH-INTACT SERPL-MCNC: 67.7 PG/ML (ref 9–77)
RBC # BLD AUTO: 4.12 M/UL (ref 4–5.4)
SODIUM SERPL-SCNC: 133 MMOL/L (ref 136–145)
TRIGL SERPL-MCNC: 74 MG/DL (ref 30–150)
URATE SERPL-MCNC: 4.8 MG/DL (ref 2.4–5.7)
WBC # BLD AUTO: 7.55 K/UL (ref 3.9–12.7)

## 2024-05-21 PROCEDURE — 85025 COMPLETE CBC W/AUTO DIFF WBC: CPT | Performed by: INTERNAL MEDICINE

## 2024-05-21 PROCEDURE — 83970 ASSAY OF PARATHORMONE: CPT | Performed by: INTERNAL MEDICINE

## 2024-05-21 PROCEDURE — 36415 COLL VENOUS BLD VENIPUNCTURE: CPT | Mod: PN | Performed by: INTERNAL MEDICINE

## 2024-05-21 PROCEDURE — 80069 RENAL FUNCTION PANEL: CPT | Performed by: INTERNAL MEDICINE

## 2024-05-21 PROCEDURE — 80061 LIPID PANEL: CPT | Performed by: INTERNAL MEDICINE

## 2024-05-21 PROCEDURE — 84550 ASSAY OF BLOOD/URIC ACID: CPT | Performed by: INTERNAL MEDICINE

## 2024-05-29 ENCOUNTER — OFFICE VISIT (OUTPATIENT)
Dept: NEPHROLOGY | Facility: CLINIC | Age: 84
End: 2024-05-29
Payer: MEDICARE

## 2024-05-29 VITALS
HEIGHT: 61 IN | HEART RATE: 77 BPM | BODY MASS INDEX: 28.59 KG/M2 | WEIGHT: 151.44 LBS | SYSTOLIC BLOOD PRESSURE: 153 MMHG | OXYGEN SATURATION: 99 % | RESPIRATION RATE: 18 BRPM | DIASTOLIC BLOOD PRESSURE: 79 MMHG

## 2024-05-29 DIAGNOSIS — I10 HYPERTENSION, UNSPECIFIED TYPE: ICD-10-CM

## 2024-05-29 DIAGNOSIS — N18.31 STAGE 3A CHRONIC KIDNEY DISEASE: Primary | ICD-10-CM

## 2024-05-29 PROCEDURE — 99999 PR PBB SHADOW E&M-EST. PATIENT-LVL III: CPT | Mod: PBBFAC,,, | Performed by: INTERNAL MEDICINE

## 2024-05-29 PROCEDURE — 99213 OFFICE O/P EST LOW 20 MIN: CPT | Mod: PBBFAC | Performed by: INTERNAL MEDICINE

## 2024-05-29 PROCEDURE — 99204 OFFICE O/P NEW MOD 45 MIN: CPT | Mod: S$PBB,,, | Performed by: INTERNAL MEDICINE

## 2024-05-29 NOTE — PROGRESS NOTES
New Consultation Report  Nephrology      Consult Requested By: PCP  Reason for Consult: CKD    History of Present Illness:  Patient is a 84 y.o. female presents for a new consultation for evaluation of elevated serum creatinine and presumed chronic kidney disease. The patient was found to have an elevated serum creatinine during routine laboratory testing, at 1.1 mg/dL.     The patient denies SOB, LE edema, hematuria or foamy urine. Concerned about her kidneys, tearful.     The patient denies taking NSAIDs or new antibiotics, recreational drugs, recent episode of dehydration, diarrhea, nausea or vomiting, acute illness, hospitalization or exposure to IV radiocontrast.     Past Medical History:   Diagnosis Date    Acid reflux     Hypertension          Current Outpatient Medications:     acetaminophen (TYLENOL) 650 MG TbSR, Take 650 mg by mouth every 8 (eight) hours., Disp: , Rfl:     amLODIPine (NORVASC) 10 MG tablet, Take 10 mg by mouth once daily., Disp: , Rfl:     aspirin (ECOTRIN) 81 MG EC tablet, Take 1 tablet (81 mg total) by mouth 2 (two) times daily., Disp: 84 tablet, Rfl: 0    ATIVAN 1 mg tablet, 1 mg as needed. , Disp: , Rfl:     atorvastatin (LIPITOR) 20 MG tablet, Take 20 mg by mouth., Disp: , Rfl:     COZAAR 100 mg tablet, 100 mg 2 (two) times daily. , Disp: , Rfl:     ergocalciferol (ERGOCALCIFEROL) 50,000 unit Cap, Take 50,000 Units by mouth every 7 days., Disp: , Rfl:     fluticasone propionate (FLONASE) 50 mcg/actuation nasal spray, 2 sprays by Each Nostril route., Disp: , Rfl:     losartan (COZAAR) 100 MG tablet, Take 100 mg by mouth., Disp: , Rfl:     pantoprazole (PROTONIX) 40 MG tablet, Take 40 mg by mouth., Disp: , Rfl:     timolol maleate 0.5% (TIMOPTIC) 0.5 % Drop, 1 drop every evening., Disp: , Rfl:     EScitalopram oxalate (LEXAPRO) 10 MG tablet, Take 0.5 tablets (5 mg total) by mouth once daily., Disp: 15 tablet, Rfl: 2  Review of patient's allergies indicates:  No Known Allergies     Past  Surgical History:   Procedure Laterality Date    CARPAL TUNNEL RELEASE Left     HYSTERECTOMY      PERCUTANEOUS CRYOTHERAPY OF PERIPHERAL NERVE USING LIQUID NITROUS OXIDE IN CLOSED NEEDLE DEVICE Left 9/27/2018    Procedure: CRYOTHERAPY, NERVE, PERIPHERAL, PERCUTANEOUS, USING LIQUID NITROUS OXIDE IN CLOSED NEEDLE DEVICE;  Surgeon: KANA Sullivan;  Location: [facility] OR;  Service: Orthopedics;  Laterality: Left;     Family History   Problem Relation Name Age of Onset    Other Mother age 60's         enlarged heart    Cancer Sister age 60's     Emphysema Brother age 70's      Social History     Tobacco Use    Smoking status: Never    Smokeless tobacco: Never   Substance Use Topics    Alcohol use: Yes     Comment: socially    Drug use: No       Review of Systems   Constitutional: Negative.    Respiratory: Negative.     Cardiovascular: Negative.    Gastrointestinal: Negative.    Genitourinary: Negative.    Skin: Negative.    All other systems reviewed and are negative.      Vitals:    05/29/24 0947   BP: (!) 153/79; 134/75 mmHg   Pulse: 77   Resp: 18       PHYSICAL EXAMINATION:  General: no distress, well nourished  Skin: color, texture, turgor normal. No rash or lesions  HEENT: Eyes: reactive pupils, normal conjunctiva. Oral mucosa moist, no ulcers. Throat: no erythema.  Neck: supple, symmetrical, trachea midline, no JVD, no carotid bruit  Lungs: clear to auscultation bilaterally and normal respiratory effort  Cardiovascular: Heart: regular rate and rhythm, S1, S2 normal, no murmur, rub or gallop.   Abdomen: bowel sounds present, no abdominal bruit, soft, non-tender non-distented; no masses, organomegaly or ascites.   Musculoskeletal: no pitting edema in lower extremities, no clubbing or cyanosis  Lymph Nodes: No cervical or supraclavicular adenopathy  Neurologic: AAOx3, normal strength and tone. No focal deficit. No asterixis.       LABORATORY DATA:  Lab Results   Component Value Date    CREATININE 1.1 05/21/2024        Prot/Creat Ratio, Urine   Date Value Ref Range Status   05/21/2024 Unable to calculate 0.00 - 0.20 Final       Lab Results   Component Value Date     (L) 05/21/2024    K 4.0 05/21/2024    CO2 25 05/21/2024       Lab Results   Component Value Date    PTH 67.7 05/21/2024    CALCIUM 10.0 05/21/2024    PHOS 3.2 05/21/2024       Lab Results   Component Value Date    HGB 11.4 (L) 05/21/2024        Lab Results   Component Value Date    HGBA1C 5.3 09/06/2018       Lab Results   Component Value Date    LDLCALC 108.2 05/21/2024         IMAGING STUDIES  Kidney US: Reviewed  Echocardiogram: Reviewed    IMPRESSION / RECOMMENDATIONS:     1. Stage 3a chronic kidney disease  Very mild, non-proteinuric, possibly due to HTN. Low risk for CKD progression. On ARB. Will obtain a US Retroperitoneal Complete. Mild hyponatremia, asked her to avoid excessive water drinking and drink to thirst. Asked her to avoid NSAIDs      2. Hypertension, unspecified type  Well controlled, no adjustments needed            SUMMARY OF PLAN:  Renal US  Avoid excessive water drinking  Avoid NSAIDS  Continue valsartan  Follow up with PCP

## 2024-06-12 ENCOUNTER — HOSPITAL ENCOUNTER (OUTPATIENT)
Dept: RADIOLOGY | Facility: HOSPITAL | Age: 84
Discharge: HOME | End: 2024-06-12
Attending: INTERNAL MEDICINE
Payer: MEDICARE

## 2024-06-12 DIAGNOSIS — N18.31 STAGE 3A CHRONIC KIDNEY DISEASE: ICD-10-CM

## 2024-06-12 PROCEDURE — 76770 US EXAM ABDO BACK WALL COMP: CPT | Mod: TC

## 2024-06-12 PROCEDURE — 76770 US EXAM ABDO BACK WALL COMP: CPT | Mod: 26,,, | Performed by: RADIOLOGY

## 2024-08-29 ENCOUNTER — OFFICE VISIT (OUTPATIENT)
Dept: ORTHOPEDICS | Facility: CLINIC | Age: 84
End: 2024-08-29
Payer: MEDICARE

## 2024-08-29 ENCOUNTER — TELEPHONE (OUTPATIENT)
Dept: ORTHOPEDICS | Facility: CLINIC | Age: 84
End: 2024-08-29
Payer: MEDICARE

## 2024-08-29 ENCOUNTER — HOSPITAL ENCOUNTER (OUTPATIENT)
Dept: RADIOLOGY | Facility: HOSPITAL | Age: 84
Discharge: HOME | End: 2024-08-29
Attending: NURSE PRACTITIONER
Payer: MEDICARE

## 2024-08-29 VITALS — HEIGHT: 61 IN | BODY MASS INDEX: 28.59 KG/M2 | WEIGHT: 151.44 LBS

## 2024-08-29 DIAGNOSIS — M25.552 LEFT HIP PAIN: ICD-10-CM

## 2024-08-29 DIAGNOSIS — M25.552 LEFT HIP PAIN: Primary | ICD-10-CM

## 2024-08-29 DIAGNOSIS — M17.12 PRIMARY OSTEOARTHRITIS OF LEFT KNEE: ICD-10-CM

## 2024-08-29 PROCEDURE — 73502 X-RAY EXAM HIP UNI 2-3 VIEWS: CPT | Mod: TC,LT

## 2024-08-29 PROCEDURE — 73502 X-RAY EXAM HIP UNI 2-3 VIEWS: CPT | Mod: 26,LT,, | Performed by: INTERNAL MEDICINE

## 2024-08-29 PROCEDURE — 99999 PR PBB SHADOW E&M-EST. PATIENT-LVL III: CPT | Mod: PBBFAC,,, | Performed by: NURSE PRACTITIONER

## 2024-08-29 PROCEDURE — 99213 OFFICE O/P EST LOW 20 MIN: CPT | Mod: PBBFAC,25 | Performed by: NURSE PRACTITIONER

## 2024-08-29 PROCEDURE — 99204 OFFICE O/P NEW MOD 45 MIN: CPT | Mod: S$PBB,,, | Performed by: NURSE PRACTITIONER

## 2024-08-29 RX ORDER — METHYLPREDNISOLONE 4 MG/1
TABLET ORAL
Qty: 1 EACH | Refills: 0 | Status: SHIPPED | OUTPATIENT
Start: 2024-08-29 | End: 2024-09-19

## 2024-08-29 NOTE — TELEPHONE ENCOUNTER
Appointment scheduled. Pt verbalized acceptance  ----- Message from Selam Salazar sent at 8/28/2024  5:10 PM CDT -----  Type:  Patient Returning Call    Who Called: Pt   Does the patient know what this is regarding?: requesting apt with MERARY GÓMEZ  Would the patient rather a call back or a response via [facility]? Call   Best Call Back Number: [phone]   Additional Information:

## 2024-08-29 NOTE — PROGRESS NOTES
"  SUBJECTIVE:     Chief Complaint & History of Present Illness:  Aga Rodriges is a New 84 y.o. year old female patient presenting today for intermittent left hip pain which started 2 days ago after bowling.  There is not a history of trauma.  The pain is located in the lateral aspect of the hip.  The pain is described as achy, 4-5/10.  It is is aggravated by walking.  There is radiation into the leg.  She reports her leg feels "heavy".  She also reports she had a Left TKR several years ago.  Previous treatments include acetaminophen which have provided minimal relief.  There is not a history of previous injury or surgery to the hip.  She does endorse chronic lower back pain.  The patient does not use an assistive device.      Past Medical History:   Diagnosis Date    Acid reflux     Hypertension        Past Surgical History:   Procedure Laterality Date    CARPAL TUNNEL RELEASE Left     HYSTERECTOMY      PERCUTANEOUS CRYOTHERAPY OF PERIPHERAL NERVE USING LIQUID NITROUS OXIDE IN CLOSED NEEDLE DEVICE Left 9/27/2018    Procedure: CRYOTHERAPY, NERVE, PERIPHERAL, PERCUTANEOUS, USING LIQUID NITROUS OXIDE IN CLOSED NEEDLE DEVICE;  Surgeon: KANA Sullivan;  Location: [facility] OR;  Service: Orthopedics;  Laterality: Left;       Family History   Problem Relation Name Age of Onset    Other Mother age 60's         enlarged heart    Cancer Sister age 60's     Emphysema Brother age 70's        Review of patient's allergies indicates:  No Known Allergies      Current Outpatient Medications:     acetaminophen (TYLENOL) 650 MG TbSR, Take 650 mg by mouth every 8 (eight) hours., Disp: , Rfl:     amLODIPine (NORVASC) 10 MG tablet, Take 10 mg by mouth once daily., Disp: , Rfl:     ATIVAN 1 mg tablet, 1 mg as needed. , Disp: , Rfl:     atorvastatin (LIPITOR) 20 MG tablet, Take 20 mg by mouth., Disp: , Rfl:     COZAAR 100 mg tablet, 100 mg 2 (two) times daily. , Disp: , Rfl:     fluticasone propionate (FLONASE) 50 mcg/actuation " "nasal spray, 2 sprays by Each Nostril route., Disp: , Rfl:     losartan (COZAAR) 100 MG tablet, Take 100 mg by mouth., Disp: , Rfl:     pantoprazole (PROTONIX) 40 MG tablet, Take 40 mg by mouth., Disp: , Rfl:     timolol maleate 0.5% (TIMOPTIC) 0.5 % Drop, 1 drop every evening., Disp: , Rfl:     aspirin (ECOTRIN) 81 MG EC tablet, Take 1 tablet (81 mg total) by mouth 2 (two) times daily., Disp: 84 tablet, Rfl: 0    ergocalciferol (ERGOCALCIFEROL) 50,000 unit Cap, Take 50,000 Units by mouth every 7 days., Disp: , Rfl:     EScitalopram oxalate (LEXAPRO) 10 MG tablet, Take 0.5 tablets (5 mg total) by mouth once daily., Disp: 15 tablet, Rfl: 2    Review of Systems:  ROS:  Constitutional: no fever or chills  Eyes: no visual changes  ENT: no nasal congestion or sore throat  Respiratory: no cough or shortness of breath  Cardiovascular: no chest pain or palpitations  Gastrointestinal: no nausea or vomiting, tolerating diet  Genitourinary: no hematuria or dysuria  Integument/Breast: no rash or pruritis  Hematologic/Lymphatic: no easy bruising or lymphadenopathy  Musculoskeletal: no arthralgias or myalgias  Neurological: no seizures or tremors  Behavioral/Psych: no auditory or visual hallucinations  Endocrine: no heat or cold intolerance      PE:  Ht 5' 1" (1.549 m)   Wt 68.7 kg (151 lb 7.3 oz)   BMI 28.62 kg/m²   Estimated body mass index is 28.62 kg/m² as calculated from the following:    Height as of this encounter: 5' 1" (1.549 m).    Weight as of this encounter: 68.7 kg (151 lb 7.3 oz).   General: Pleasant, cooperative, NAD   HEENT: NCAT, sclera nonicteric   Lungs: Respirations are equal and unlabored.   Abdomen: Soft and non-tender.  CV: 2+ bilateral upper and lower extremity pulses.   Skin: Intact throughout LE with no rashes, erythema, or lesions  Extremities: No LE edema, NVI lower extremities      Hip Exam:   leftpositives: tenderness over greater trochanter and negatives: no pain with heel impact  pulses " full    100 degrees flexion  0 degrees extension   40 degrees internal rotation  35 degrees external rotation  20 degrees abduction  15 degrees adduction     No pain with ROM of her left knee      RADIOGRAPHS:  X-ray of the left hip was obtained, no acute fracture seen.  Patient appears to have degenerative disc disease of the lumbar region.  All radiographs were personally reviewed by me.    ASSESSMENT/PLAN:       ICD-10-CM ICD-9-CM   1. Left hip pain  M25.552 719.45   2. Primary osteoarthritis of left knee  M17.12 715.16       -Aga Rodriges presents to clinic today with c/c left hip pain for the past 2 days secondary to bowling.  She denies any falls or injuries.  She felt the pain when she placed a weight down throw the ball during bowling.  She had been using Tylenol extra-strength which normally has helped in the past but is not getting any relief with Tylenol.  -X-ray as above.    On clinical exam patient has tenderness over the greater trochanteric area.  She also endorses some leg weakness.  I believe some of her symptoms may be related to her chronic degenerative disc disease of her lower back.  I will treat the patient with a Medrol Dosepak.  Advised the patient if her symptoms persist then we will consider formal physical therapy.    Follow up in Orthopedic Trauma in 6 weeks p.r.n., sooner for new or worsening symptoms.

## 2024-09-25 ENCOUNTER — OFFICE VISIT (OUTPATIENT)
Dept: ORTHOPEDICS | Facility: CLINIC | Age: 84
End: 2024-09-25
Payer: MEDICARE

## 2024-09-25 VITALS — BODY MASS INDEX: 27.79 KG/M2 | WEIGHT: 151 LBS | HEIGHT: 62 IN

## 2024-09-25 DIAGNOSIS — M76.02 GLUTEAL TENDINITIS OF LEFT BUTTOCK: Primary | ICD-10-CM

## 2024-09-25 PROCEDURE — 99999 PR PBB SHADOW E&M-EST. PATIENT-LVL III: CPT | Mod: PBBFAC,,, | Performed by: ORTHOPAEDIC SURGERY

## 2024-09-25 PROCEDURE — 99213 OFFICE O/P EST LOW 20 MIN: CPT | Mod: PBBFAC | Performed by: ORTHOPAEDIC SURGERY

## 2024-09-25 PROCEDURE — 99213 OFFICE O/P EST LOW 20 MIN: CPT | Mod: S$PBB,,, | Performed by: ORTHOPAEDIC SURGERY

## 2024-10-02 NOTE — PROGRESS NOTES
"Subjective:      Patient ID: Aga Rodriges is a 84 y.o. female.    Chief Complaint:   Pain and Injury of the Left Hip (8/28/24 burn wound from sleeping on heating pad. Seeing wound clinic)    HPI  She comes in for left hip pain.  She is going to wound care for a burn caused by a heating pad to the left buttock.  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  The pain goes down to just above her ankle.  No numbness or tingling.  Aching in the thigh at night.  Pain with abduction.  Pain when lying on left side.  From her recent visit with another provider in this office, reviewed with her and confirmed:  Aga Rodriges is a New 84 y.o. year old female patient presenting today for intermittent left hip pain which started 2 days ago after bowling.  There is not a history of trauma.  The pain is located in the lateral aspect of the hip.  The pain is described as achy, 4-5/10.  It is is aggravated by walking.  There is radiation into the leg.  She reports her leg feels "heavy".  She also reports she had a Left TKR several years ago.  Previous treatments include acetaminophen which have provided minimal relief.  There is not a history of previous injury or surgery to the hip.  She does endorse chronic lower back pain.  The patient does not use an assistive device.       Objective:        Ortho/SPM Exam    She is tender to the left greater trochanter.  There is pain with resisted abduction.  Negative Stinchfield, negative C sign.  Negative FADIR, negative MARCUS.  Negative flip test.      IMAGING:  Recent x-rays showed no significant hip arthrosis.  Assessment:     Gluteal tendinopathy, left hip    Plan:   She is referred to Dr. Daniela Chowdary for further evaluation and interventions possibly including OMT, injections, others as deemed appropriate.  These were discussed with her today.    The patient's pathophysiology was explained in detail with reference to x-rays, " models, other visual aids as appropriate.  Treatment options were discussed in detail.  Questions were invited and answered to the patient's satisfaction.      Jas Mahmood MD  Orthopedic Surgery

## 2024-10-24 NOTE — PROGRESS NOTES
Subjective:     Aga Rodrgies     No chief complaint on file.    HPI    Aga is a 84 y.o. female coming in today for left hip pain that began about 1 month(s) ago, referred by Dr. Mahmood. Pt. describes the pain as a 10/10 achy pain that does radiate to her thigh and shin. There was not a fall/injury/ or trauma associated with the onset of symptoms. The pain is better with rest and worse with night time. 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 Pt. Denies any other musculoskeletal complaints at this time.     Joint instability? no  Mechanical locking/clicking? no  Affecting ADL's? yes  Affecting sleep? yes    Occupation: retired    Review of Systems   Constitutional:  Negative for chills and fever.   Musculoskeletal:  Positive for joint pain and myalgias. Negative for back pain, falls and neck pain.   Neurological:  Negative for dizziness, tingling, focal weakness, weakness and headaches.       PAST MEDICAL HISTORY:   Past Medical History:   Diagnosis Date    Acid reflux     Hypertension      PAST SURGICAL HISTORY:   Past Surgical History:   Procedure Laterality Date    CARPAL TUNNEL RELEASE Left     HYSTERECTOMY      PERCUTANEOUS CRYOTHERAPY OF PERIPHERAL NERVE USING LIQUID NITROUS OXIDE IN CLOSED NEEDLE DEVICE Left 9/27/2018    Procedure: CRYOTHERAPY, NERVE, PERIPHERAL, PERCUTANEOUS, USING LIQUID NITROUS OXIDE IN CLOSED NEEDLE DEVICE;  Surgeon: KANA Sullivan;  Location: [facility] OR;  Service: Orthopedics;  Laterality: Left;     FAMILY HISTORY:   Family History   Problem Relation Name Age of Onset    Other Mother age 60's         enlarged heart    Cancer Sister age 60's     Emphysema Brother age 70's      SOCIAL HISTORY:   Social History     Socioeconomic History    Marital status: Single   Tobacco Use    Smoking status: Never    Smokeless tobacco: Never   Substance and Sexual Activity    Alcohol use: Yes     Comment: socially    Drug use: No     Social Drivers of Health      Financial Resource Strain: Low Risk  (5/28/2024)    Overall Financial Resource Strain (CARDIA)     Difficulty of Paying Living Expenses: Not hard at all   Food Insecurity: No Food Insecurity (5/28/2024)    Hunger Vital Sign     Worried About Running Out of Food in the Last Year: Never true     Ran Out of Food in the Last Year: Never true   Transportation Needs: No Transportation Needs (7/21/2023)    [facility]     Lack of Transportation (Medical): No     Lack of Transportation (Non-Medical): No   Physical Activity: Unknown (5/28/2024)    Exercise Vital Sign     Days of Exercise per Week: 3 days   Stress: Stress Concern Present (5/28/2024)    [employer] - Occupational Stress Questionnaire     Feeling of Stress : Rather much   Housing Stability: Unknown (5/28/2024)    Housing Stability Vital Sign     Unable to Pay for Housing in the Last Year: No     MEDICATIONS:   Current Outpatient Medications:     acetaminophen (TYLENOL) 650 MG TbSR, Take 650 mg by mouth every 8 (eight) hours., Disp: , Rfl:     amLODIPine (NORVASC) 10 MG tablet, Take 10 mg by mouth once daily., Disp: , Rfl:     aspirin (ECOTRIN) 81 MG EC tablet, Take 1 tablet (81 mg total) by mouth 2 (two) times daily., Disp: 84 tablet, Rfl: 0    ATIVAN 1 mg tablet, 1 mg as needed. , Disp: , Rfl:     atorvastatin (LIPITOR) 20 MG tablet, Take 20 mg by mouth., Disp: , Rfl:     bacitracin 500 unit/gram Oint, Apply topically 2 (two) times daily. (Patient not taking: Reported on 9/25/2024), Disp: 30 g, Rfl: 0    COZAAR 100 mg tablet, 100 mg 2 (two) times daily. , Disp: , Rfl:     fluticasone propionate (FLONASE) 50 mcg/actuation nasal spray, 2 sprays by Each Nostril route., Disp: , Rfl:     losartan (COZAAR) 100 MG tablet, Take 100 mg by mouth., Disp: , Rfl:     MEDIHONEY, HONEY, TOP, Apply 1 Units topically every other day., Disp: , Rfl:     pantoprazole (PROTONIX) 40 MG tablet, Take 40 mg by  "mouth., Disp: , Rfl:     timolol maleate 0.5% (TIMOPTIC) 0.5 % Drop, 1 drop every evening., Disp: , Rfl:     ALLERGIES: Review of patient's allergies indicates:  No Known Allergies    Objective:     VITAL SIGNS: BP (!) 146/77   Pulse 76   Ht 5' 2" (1.575 m)   Wt 67.9 kg (149 lb 11.1 oz)   BMI 27.38 kg/m²    General    Nursing note and vitals reviewed.  Constitutional: She is oriented to person, place, and time. She appears well-developed and well-nourished.   HENT:   Head: Normocephalic and atraumatic.   no nasal discharge, no external ear redness or discharge   Eyes:   EOM is full and smooth  No eye redness or discharge   Neck: Neck supple. No tracheal deviation present.   Cardiovascular:  Normal rate.            2+ Radial pulse bilaterally  2+ Dorsalis Pedis pulse bilaterally  No LE edema appreciated   Pulmonary/Chest: Effort normal. No respiratory distress.   Abdominal: She exhibits no distension.   No rigidity   Neurological: She is alert and oriented to person, place, and time. She exhibits normal muscle tone. Coordination normal.   See details below   Psychiatric: She has a normal mood and affect. Her behavior is normal.             MUSCULOSKELETAL EXAM  HIP: left HIP  The affected hip is compared to the contralateral hip.    Observation:    There is no edema, erythema, or ecchymosis in the lumbosacral region.   There is no Trendelenburg sign on either side  No obvious pelvic obliquity while standing.    No thoracolumbar scoliosis observed.    No midline skin abnormalities.  No atrophy noted in the lower limbs.  Gait: Non-antalgic with Neutral ankle mechanics and Neutral medial arch    ROM (* = with pain):  Passive hip flexion to 120° on left and 120° on right  Passive hip internal rotation to 45° on left* and 45° on right  Passive hip external rotation to 45° on left and 45° on right   Passive hip abduction to 45° on left and 45° on right  All motions above are without pain.    Tenderness To " Palpation:  No tenderness at the ASIS, AIIS, PSIS, PIIS, iliac crest, pubic bones, ischial tuberosity.  No tenderness throughout the lumbar spine, iliolumbar region, or posterior pelvis.  No tenderness throughout the sacrum or piriformis  + tenderness over the greater trochanteric bursa   + tenderness at the glut attachments on the greater trochanter  + tenderness over proximal IT band   No hip flexor musculature tenderness    Strength Testing (* = with pain):  Hip flexion - 5/5 on left and 5/5 on right  Hip extension - 5/5 on left and 5/5 on right  Hip adduction - 5/5 on left and 5/5 on right  Hip abduction - 5/5 on left and 5/5 on right  Knee flexion - 5/5 on left and 5/5 on right  Knee extension - 5/5 on left and 5/5 on right  Glutaeus medius - 4+/5 on left and 4+/5 on right compensatory firing patterns with compensatory firing patterns    Special Tests:  Standing Trendelenburg test - negative    Seated straight leg raise - negative  Supine straight leg raise - negative  Hamstring flexibility symmetric    Log roll - negative  MARCUS - negative  FADIR - negative  Scour test - negative  No pain with posterior hip capsule compression    ASIS compression test - negative  SI drawer test - negative   Thigh thrust test - negative     Piriformis test (Bonnet's) - negative  Ely's test - negative  Quadriceps flexibility symmetric.  Lisa test - negative  Rafael compression test - negative    Fulcrum test - negative    Leg lengths symmetric following OMT to the pelvis.     Neurovascular Exam:  Normal gait without Trendelenburg or antalgia.  2+ femoral, DP, and PT pulses BL.  No skin changes, no abnormal hair distribution.  Sensation intact to light touch throughout the obturator and medial/lateral/posterior femoral cutaneous nerves.  2+/4 reflexes at L4 and S1 dermatomes  Capillary refill intact to <2 seconds in all lower limb digits.    TART (Tissue texture abnormality, Asymmetry,  Restriction of motion and/or Tenderness)  changes:     Thoracic Spine   T1 Neutral   T2 Neutral   T3 Neutral   T4 Neutral   T5 Neutral   T6 Neutral   T7 Neutral   T8 Neutral   T9 Neutral   T10 Neutral   T11 NS-right,R-left   T12 NS-right,R-left     Rib cage: R11 external torsion on left     Lumbar Spine   L1 NS-right,R-left   L2 ERS LEFT   L3 Neutral   L4 FRS LEFT   L5 FRS LEFT       Pelvis:  Innominate:Right anterior rotation  Pubic bone:Right inferior pubic shear    Sacrum:Right on Left sacral torsion    Lower extremity: left gluteal Herniated trigger point (HTP) fascial distortion       Key   F= Flexed   E = Extended   R = Rotated   S = Sidebent   TTA = tissue texture abnormality     IMAGIN. X-ray ordered due to right hip pain. (AP pelvis and frogleg lateral left views) taken 24   2. X-ray images were reviewed personally by me and then directly with patient.  3. FINDINGS: X-ray images obtained demonstrate that mild degenerative change present in the lower spine. The osseous structures are intact without evidence of fracture or osseous destructive process. There is degenerative change of the pubic symphysis. Small soft tissue calcification seen adjacent to the left greater trochanter. Hip joints demonstrate no significant degenerative change.   4. IMPRESSION: Mild degenerative change present in the lower spine.  There is degenerative change of the pubic symphysis. Small soft tissue calcification seen adjacent to the left greater trochanter. Hip joints demonstrate no significant degenerative change.       Assessment:      Encounter Diagnoses   Name Primary?    Gluteal tendinitis of left buttock Yes    Greater trochanteric bursitis of left hip     Myalgia     Somatic dysfunction of lumbar region     Sacral region somatic dysfunction     Somatic dysfunction of pelvic region     Somatic dysfunction of thoracic region     Somatic dysfunction of rib cage region     Somatic dysfunction of lower extremity           Plan:   1. Left lateral hip pain  secondary to weak gluteal muscles and poor pelvic/core stability with compensatory muscle firing patterns, including over-firing of TFL musculature and associated greater trochanteric bursa and IT band irritation.   - Continue over-the-counter Tylenol and Ice up to 20 minutes at a time prn for pain control.   - OMT performed today to address biomechanical contributions to pain  - HEP started  - discussed option of ultrasound-guided greater trochanteric bursa CSI and formal PT as next step, symptoms persist  -  X-ray images of left hip taken today (AP pelvis and frogleg lateral  left views) showed mild degenerative change present in the lower spine.  There is degenerative change of the pubic symphysis. Small soft tissue calcification seen adjacent to the left greater trochanter. Hip joints demonstrate no significant degenerative change. Images were personally reviewed with patient.    2. OMT 5-6 regions. Oral consent obtained.  Reviewed benefits and potential side effects.   - OMT indicated today due to signs and symptoms as well as local and remote somatic dysfunction findings and their related neurokinetic, lymphatic, fascial and/or arteriovenous body connections.   - OMT techniques used: Myofascial Release, Muscle Energy, and Fascial Distortion Model   - Treatment was tolerated well. Improvement noted in segmental mobility post-treatment in dysfunctional regions. There were no adverse events and no complications immediately following treatment.     3. Pt. Given the following HEP:  A)  Pelvic clock exercises given to do from the 6-12 o'clock positions:10-15 reps, twice daily. Hand out of exercise also given.   B) Clam shell exercises bilaterally: hold leg in abducted and externally rotated position for 5-10 seconds, repeat 5-10 times  C) Quadratus lumborum self-stretch on all fours: hold stretch for 30 seconds, repeating 2-3 times on each side. Do stretch twice daily. Hand-out also given.   D) IT band rolling:  recommend using rolling stick or foam roller to roll out IT band tension bilaterally, 1-2 times a day.     [address]889 HOME EXERCISE PROGRAM (HEP):  The patient was taught a homegoing physical therapy regimen as described above. The patient demonstrated understanding of the exercises and proper technique of their execution. This interaction took 15 minutes.     4. Follow-up in 3 weeks for reevaluation    5. Patient agreeable to today's plan and all questions were answered    This note is dictated using the M*Modal Fluency Direct word recognition program. There are word recognition mistakes that are occasionally missed on review.

## 2024-10-25 ENCOUNTER — OFFICE VISIT (OUTPATIENT)
Dept: SPORTS MEDICINE | Facility: CLINIC | Age: 84
End: 2024-10-25
Payer: MEDICARE

## 2024-10-25 VITALS
HEIGHT: 62 IN | BODY MASS INDEX: 27.55 KG/M2 | HEART RATE: 76 BPM | WEIGHT: 149.69 LBS | DIASTOLIC BLOOD PRESSURE: 77 MMHG | SYSTOLIC BLOOD PRESSURE: 146 MMHG

## 2024-10-25 DIAGNOSIS — M70.62 GREATER TROCHANTERIC BURSITIS OF LEFT HIP: ICD-10-CM

## 2024-10-25 DIAGNOSIS — M79.10 MYALGIA: ICD-10-CM

## 2024-10-25 DIAGNOSIS — M99.08 SOMATIC DYSFUNCTION OF RIB CAGE REGION: ICD-10-CM

## 2024-10-25 DIAGNOSIS — M99.03 SOMATIC DYSFUNCTION OF LUMBAR REGION: ICD-10-CM

## 2024-10-25 DIAGNOSIS — M99.05 SOMATIC DYSFUNCTION OF PELVIC REGION: ICD-10-CM

## 2024-10-25 DIAGNOSIS — M99.02 SOMATIC DYSFUNCTION OF THORACIC REGION: ICD-10-CM

## 2024-10-25 DIAGNOSIS — M76.02 GLUTEAL TENDINITIS OF LEFT BUTTOCK: Primary | ICD-10-CM

## 2024-10-25 DIAGNOSIS — M99.04 SACRAL REGION SOMATIC DYSFUNCTION: ICD-10-CM

## 2024-10-25 DIAGNOSIS — M99.06 SOMATIC DYSFUNCTION OF LOWER EXTREMITY: ICD-10-CM

## 2024-10-25 PROCEDURE — 99213 OFFICE O/P EST LOW 20 MIN: CPT | Mod: PBBFAC | Performed by: NEUROMUSCULOSKELETAL MEDICINE & OMM

## 2024-10-25 PROCEDURE — 99999 PR PBB SHADOW E&M-EST. PATIENT-LVL III: CPT | Mod: PBBFAC,,, | Performed by: NEUROMUSCULOSKELETAL MEDICINE & OMM

## 2024-11-11 NOTE — PROGRESS NOTES
Subjective:     Aga Aguilar [facility]     Chief Complaint   Patient presents with    Left Hip - Pain     HPI    Aga is a 84 y.o. female coming in today for left hip pain. Since last visit the pain has Improved, but pain still present at times. Pt is somewhat compliant with HEP. The pain is better with rest and worse with walking and going from sitting to standing. Pt. describes the pain as a 3/10 achy pain that does not radiate. There has not been any new a fall/injury/ or traumas since last visit.  Patient also admits to left knee pain (hx of L TKA)  and low back pain.  Pt. denies any new musculoskeletal complaints at this time.     Office note from 10/25/24 reviewed    Joint instability? no  Mechanical locking/clicking? no  Affecting ADL's? yes  Affecting sleep? yes    Occupation: retired    PAST MEDICAL HISTORY:   Past Medical History:   Diagnosis Date    Acid reflux     Hypertension      PAST SURGICAL HISTORY:   Past Surgical History:   Procedure Laterality Date    CARPAL TUNNEL RELEASE Left     HYSTERECTOMY      PERCUTANEOUS CRYOTHERAPY OF PERIPHERAL NERVE USING LIQUID NITROUS OXIDE IN CLOSED NEEDLE DEVICE Left 9/27/2018    Procedure: CRYOTHERAPY, NERVE, PERIPHERAL, PERCUTANEOUS, USING LIQUID NITROUS OXIDE IN CLOSED NEEDLE DEVICE;  Surgeon: KANA Sullivan;  Location: [facility] OR;  Service: Orthopedics;  Laterality: Left;     FAMILY HISTORY:   Family History   Problem Relation Name Age of Onset    Other Mother age 60's         enlarged heart    Cancer Sister age 60's     Emphysema Brother age 70's      SOCIAL HISTORY:   Social History     Socioeconomic History    Marital status: Single   Tobacco Use    Smoking status: Never    Smokeless tobacco: Never   Substance and Sexual Activity    Alcohol use: Yes     Comment: socially    Drug use: No     Social Drivers of Health     Financial Resource Strain: Low Risk  (5/28/2024)    Overall Financial Resource Strain (CARDIA)     Difficulty of Paying Living Expenses:  Not hard at all   Food Insecurity: No Food Insecurity (5/28/2024)    Hunger Vital Sign     Worried About Running Out of Food in the Last Year: Never true     Ran Out of Food in the Last Year: Never true   Transportation Needs: No Transportation Needs (7/21/2023)    Received from [facility] - Transportation     Lack of Transportation (Medical): No     Lack of Transportation (Non-Medical): No   Physical Activity: Unknown (5/28/2024)    Exercise Vital Sign     Days of Exercise per Week: 3 days   Stress: Stress Concern Present (5/28/2024)    [employer] - Occupational Stress Questionnaire     Feeling of Stress : Rather much   Housing Stability: Unknown (5/28/2024)    Housing Stability Vital Sign     Unable to Pay for Housing in the Last Year: No     MEDICATIONS:   Current Outpatient Medications:     acetaminophen (TYLENOL) 650 MG TbSR, Take 650 mg by mouth every 8 (eight) hours., Disp: , Rfl:     amLODIPine (NORVASC) 10 MG tablet, Take 10 mg by mouth once daily., Disp: , Rfl:     aspirin (ECOTRIN) 81 MG EC tablet, Take 1 tablet (81 mg total) by mouth 2 (two) times daily., Disp: 84 tablet, Rfl: 0    ATIVAN 1 mg tablet, 1 mg as needed. , Disp: , Rfl:     atorvastatin (LIPITOR) 20 MG tablet, Take 20 mg by mouth., Disp: , Rfl:     bacitracin 500 unit/gram Oint, Apply topically 2 (two) times daily. (Patient not taking: Reported on 9/25/2024), Disp: 30 g, Rfl: 0    COZAAR 100 mg tablet, 100 mg 2 (two) times daily. , Disp: , Rfl:     fluticasone propionate (FLONASE) 50 mcg/actuation nasal spray, 2 sprays by Each Nostril route., Disp: , Rfl:     losartan (COZAAR) 100 MG tablet, Take 100 mg by mouth., Disp: , Rfl:     pantoprazole (PROTONIX) 40 MG tablet, Take 40 mg by mouth., Disp: , Rfl:     timolol maleate 0.5% (TIMOPTIC) 0.5 % Drop, 1 drop every evening., Disp: , Rfl:     ALLERGIES: Review of patient's allergies indicates:  No Known Allergies    Objective:   VITAL SIGNS: BP (!) 166/83  (Patient Position: Sitting)   Pulse 68    General    Vitals reviewed.  Constitutional: She is oriented to person, place, and time. She appears well-developed and well-nourished.   Neurological: She is alert and oriented to person, place, and time.   Psychiatric: She has a normal mood and affect. Her behavior is normal.           MUSCULOSKELETAL EXAM  HIP: left HIP  The affected hip is compared to the contralateral hip.    Observation:    There is no edema, erythema, or ecchymosis in the lumbosacral region.   There is no Trendelenburg sign on either side  No obvious pelvic obliquity while standing.    No thoracolumbar scoliosis observed.    No midline skin abnormalities.  No atrophy noted in the lower limbs.  Gait: Non-antalgic with Neutral ankle mechanics and Neutral medial arch    ROM (* = with pain):  Passive hip flexion to 120° on left and 120° on right  Passive hip internal rotation to 45° on left* and 45° on right  Passive hip external rotation to 45° on left and 45° on right   Passive hip abduction to 45° on left and 45° on right  All motions above are without pain.    Tenderness To Palpation:  No tenderness at the ASIS, AIIS, PSIS, PIIS, iliac crest, pubic bones, ischial tuberosity.  No tenderness throughout the lumbar spine, iliolumbar region, or posterior pelvis.  No tenderness throughout the sacrum or piriformis  + tenderness over the greater trochanteric bursa   + tenderness at the glut attachments on the greater trochanter  No tenderness over proximal IT band   No hip flexor musculature tenderness    Strength Testing (* = with pain):  Hip flexion - 5/5 on left and 5/5 on right  Hip extension - 5/5 on left and 5/5 on right  Hip adduction - 5/5 on left and 5/5 on right  Hip abduction - 5/5 on left and 5/5 on right  Knee flexion - 5/5 on left and 5/5 on right  Knee extension - 5/5 on left and 5/5 on right  Glutaeus medius - 4+/5 on left and 4+/5 on right compensatory firing patterns with compensatory  firing patterns    Special Tests:  Standing Trendelenburg test - negative    Seated straight leg raise - negative  Supine straight leg raise - negative  Hamstring flexibility symmetric    Log roll - negative  MARCUS - negative  FADIR - negative  Scour test - negative  No pain with posterior hip capsule compression    ASIS compression test - negative  SI drawer test - negative   Thigh thrust test - negative     Piriformis test (Bonnet's) - negative  Ely's test - negative  Quadriceps flexibility symmetric.  Lisa test - negative  Rafael compression test - negative    Fulcrum test - negative    Leg lengths symmetric following OMT to the pelvis.     Neurovascular Exam:  Normal gait without Trendelenburg or antalgia.  2+ femoral, DP, and PT pulses BL.  No skin changes, no abnormal hair distribution.  Sensation intact to light touch throughout the obturator and medial/lateral/posterior femoral cutaneous nerves.  2+/4 reflexes at L4 and S1 dermatomes  Capillary refill intact to <2 seconds in all lower limb digits.    Large Joint Aspiration/Injection   Greater trochanteric bursa  left    Performed by: DEAN PIERCE  Authorized by: DEAN PIERCE   Consent Done?: Yes (Verbal)  Indications: Pain  Site marked: The procedure site was marked   Timeout: Prior to procedure the correct patient, procedure, and site was verified     Location: Greater trochanteric bursa, left  Prep: Patient was prepped with Chlorhexidine and alcohol.  Skin anesthetic: Ethyl Chloride spray was used prior to skin puncture.  Ultrasonic Guidance for needle placement: Yes  Procedure: After skin anesthetic was applied, the 22G, 3.5 needle was used to inject the greater trochanteric bursa with a 3 cc mixture of 1 cc of 40 mg/ml triamcinolone acetonide and 2 cc of 0.2% Naropin was injected into the bursa.  Needle size: 22 G, 3.5  Approach: Lateral  Medications: 40 mg triamcinolone acetonide 40 mg/mL  Patient tolerance: Patient tolerated the procedure  "well with no immediate complications    Ultrasound guidance was used for needle localization with SonoSite Edge 2, 9-L MHz linear probe(s). Images were saved and stored for documentation. The structures of the lateral hip were visualized. Dynamic visualization of the 20g 3.5" needle(s) was continuous throughout the procedure and maintained good position and correct needle placement.      Triamcinolone:  NDC: [address]-9  LOT: [id number]  EXP: 07/2026      Assessment:      Encounter Diagnoses   Name Primary?    Gluteal tendinitis of left buttock Yes    Greater trochanteric bursitis of left hip         Plan:   1. Left lateral hip pain secondary to weak gluteal muscles and poor pelvic/core stability with compensatory muscle firing patterns and associated greater trochanteric bursa and IT band irritation- improved but pain still present  - Referral to outpatient PT ([facility]) for increased pelvic stability, core strengthening, gluteal strengthening and eccentric exercise program, and exercise program  - Continue over-the-counter Tylenol and Ice up to 20 minutes at a time prn for pain control.   - continue HEP  -  X-ray images of left hip taken today (AP pelvis and frogleg lateral  left views) showed mild degenerative change present in the lower spine.  There is degenerative change of the pubic symphysis. Small soft tissue calcification seen adjacent to the left greater trochanter. Hip joints demonstrate no significant degenerative change. Images were personally reviewed with patient.    2. Continue the following HEP:  A)  Pelvic clock exercises given to do from the 6-12 o'clock positions:10-15 reps, twice daily. Hand out of exercise also given.   B) Clam shell exercises bilaterally: hold leg in abducted and externally rotated position for 5-10 seconds, repeat 5-10 times  C) Quadratus lumborum self-stretch on all fours: hold stretch for 30 seconds, repeating 2-3 times on each side. Do stretch twice daily. Hand-out " also given.   D) IT band rolling: recommend using rolling stick or foam roller to roll out IT band tension bilaterally, 1-2 times a day.      The patient was taught a homegoing physical therapy regimen as described above. The patient demonstrated understanding of the exercises and proper technique of their execution.     3. Follow-up upon completion of PT if pain persist or deteriorates    4. Patient agreeable to today's plan and all questions were answered    This note is dictated using the M*Modal Fluency Direct word recognition program. There are word recognition mistakes that are occasionally missed on review.

## 2024-11-12 ENCOUNTER — OFFICE VISIT (OUTPATIENT)
Dept: SPORTS MEDICINE | Facility: CLINIC | Age: 84
End: 2024-11-12
Payer: MEDICARE

## 2024-11-12 VITALS — HEART RATE: 68 BPM | SYSTOLIC BLOOD PRESSURE: 166 MMHG | DIASTOLIC BLOOD PRESSURE: 83 MMHG

## 2024-11-12 DIAGNOSIS — M76.02 GLUTEAL TENDINITIS OF LEFT BUTTOCK: Primary | ICD-10-CM

## 2024-11-12 DIAGNOSIS — M70.62 GREATER TROCHANTERIC BURSITIS OF LEFT HIP: ICD-10-CM

## 2024-11-12 PROCEDURE — 99999PBSHW PR PBB SHADOW TECHNICAL ONLY FILED TO HB: Mod: PBBFAC,,,

## 2024-11-12 PROCEDURE — 99213 OFFICE O/P EST LOW 20 MIN: CPT | Mod: PBBFAC | Performed by: NEUROMUSCULOSKELETAL MEDICINE & OMM

## 2024-11-12 PROCEDURE — 99214 OFFICE O/P EST MOD 30 MIN: CPT | Mod: 25,S$PBB,, | Performed by: NEUROMUSCULOSKELETAL MEDICINE & OMM

## 2024-11-12 PROCEDURE — 20611 DRAIN/INJ JOINT/BURSA W/US: CPT | Mod: PBBFAC | Performed by: NEUROMUSCULOSKELETAL MEDICINE & OMM

## 2024-11-12 PROCEDURE — 20611 DRAIN/INJ JOINT/BURSA W/US: CPT | Mod: S$PBB,LT,, | Performed by: NEUROMUSCULOSKELETAL MEDICINE & OMM

## 2024-11-12 PROCEDURE — 99999 PR PBB SHADOW E&M-EST. PATIENT-LVL III: CPT | Mod: PBBFAC,,, | Performed by: NEUROMUSCULOSKELETAL MEDICINE & OMM

## 2024-11-12 RX ORDER — TRIAMCINOLONE ACETONIDE 40 MG/ML
40 INJECTION, SUSPENSION INTRA-ARTICULAR; INTRAMUSCULAR
Status: COMPLETED | OUTPATIENT
Start: 2024-11-12 | End: 2024-11-12

## 2024-11-12 RX ADMIN — TRIAMCINOLONE ACETONIDE 40 MG: 40 INJECTION, SUSPENSION INTRA-ARTICULAR; INTRAMUSCULAR at 10:11

## 2024-11-18 ENCOUNTER — CLINICAL SUPPORT (OUTPATIENT)
Dept: REHABILITATION | Facility: HOSPITAL | Age: 84
End: 2024-11-18
Payer: MEDICARE

## 2024-11-18 DIAGNOSIS — M76.02 GLUTEAL TENDINITIS OF LEFT BUTTOCK: ICD-10-CM

## 2024-11-18 DIAGNOSIS — M79.18 GLUTEAL PAIN: Primary | ICD-10-CM

## 2024-11-18 DIAGNOSIS — M70.62 GREATER TROCHANTERIC BURSITIS OF LEFT HIP: ICD-10-CM

## 2024-11-18 DIAGNOSIS — M25.552 ACUTE HIP PAIN, LEFT: ICD-10-CM

## 2024-11-18 PROCEDURE — 97162 PT EVAL MOD COMPLEX 30 MIN: CPT | Mod: PN

## 2024-11-18 PROCEDURE — 97110 THERAPEUTIC EXERCISES: CPT | Mod: PN

## 2024-11-18 NOTE — PROGRESS NOTES
"[facility]  Physical Therapy Initial Evaluation - Hip     Name: Aga Aguilar [facility] Number: [id number]    Therapy Diagnosis:   Encounter Diagnoses   Name Primary?    Gluteal tendinitis of left buttock     Greater trochanteric bursitis of left hip      Physician: Daniela Chowdary DO    Physician Orders: PT Eval and Treat   Medical Diagnosis from Referral:   M76.02 (ICD-10-CM) - Gluteal tendinitis of left buttock   M70.62 (ICD-10-CM) - Greater trochanteric bursitis of left hip     Evaluation Date: 11/18/2024  Authorization Period Expiration: 12/31/24  Plan of Care Expiration: 2/10/25 or 24 Visit  Progress Note Due: 12/18/2024  Visit # / Visits authorized: 1/ 1  Visits Remaining - 0  PTA visit #: 0/6    Precautions: Standard (Referral to outpatient PT ([facility]) for increased pelvic stability, core strengthening, gluteal strengthening and eccentric exercise program, and exercise program )    Eval Visit FOTO-  (Date/Score)   5th Visit FOTO   -  (Date/Score)   10th Visit FOTO  -  (Date/Score)   D/C FOTO          -  (Date/Score)     Time In: 1400  Time Out: 1500  Total Appointment Time (timed & untimed codes): 60 minutes        Subjective     History of current condition - Aga is a 84 y.o. year old female who presents to the [facility] PT clinic  with complaint of left hip pain following trasporting items in and out of the attic. Patient report pain in the lateral aspect, and anterior portion of the hip. Pt report onset of the symptoms occurred  2 monts prior to evaluation. Precipitating event:Injury . Current symptoms include: left hip pain. Aggravating factors: prolong sitting. Patient reports a recent injection that has decreased the left hip pain. Patients presently rates pain 0/10 on pain scale. (Pain following walking exercise ~ 1 hr    Falls: none    Mechanism of Injury: injury  Next MD Visit: TBD        Imaging:X-ray   Per radiology report "Mild degenerative change " "present in the lower spine. The osseous structures are intact without evidence of fracture or osseous destructive process. There is degenerative change of the pubic symphysis. Small soft tissue calcification seen adjacent to the left greater trochanter. Hip joints demonstrate no significant degenerative change. "    Prior Therapy: Land based therapy received for current condition   Social History: Aga lives in a single story home with 0 steps to enter and  lives alone  Assistive Devices Owned: none   Occupation: Retired   Hobbies/Exercise: bowling  Hand Dominance: right  Prior Level of Function: Independent  Current Level of Function: Modified Independent secondary to left hip pain     Pain:  Current 0/10, worst 2/10 (sit to stand increases pain), best 0/10 (since injection reduces pain)  Location: left hip   Description: Aching  Aggravating Factors: Sitting, Standing, and Walking  Easing Factors: pain medication    Pts goals: reduction in hip pain left     Medical History:   Past Medical History:   Diagnosis Date    Acid reflux     Hypertension        Surgical History:   Aga Rodriges  has a past surgical history that includes Hysterectomy; Carpal tunnel release (Left); and Percutaneous cryotherapy of peripheral nerve using liquid nitrous oxide in closed needle device (Left, 9/27/2018).    Medications:   Aga has a current medication list which includes the following prescription(s): acetaminophen, amlodipine, aspirin, ativan, atorvastatin, bacitracin, cozaar, fluticasone propionate, losartan, pantoprazole, and timolol maleate 0.5%.    Allergies:   Review of patient's allergies indicates:  No Known Allergies     Objective     Posture: Good  Palpation: mild generalized muscle tenderness  Sensation: intact to light touch  DTRs: 2+    Range of Motion/Strength:     Hip Left Right Pain/Dysfunction with Movement   AROM      Hip flexion (120)  115°   120°     Hip extension (20)      Hip abduction (45)  " 18°   30°     Hip adduction (30)  30°    40°     Hip external rotation (45)  30°   35°     Hip internal rotation (45)  45°   35°     Knee flexion (135)  110°   120°     Knee extension (0)  0°   0°           RLE 5/5 4+/5 4/5 4-/5 3+/5 3/5 3-/5 2+/5 2/5 2-/5 1/5 0 NT   Hip Flexion   x                  Hip Extension   x                  Hip Abduction   x                  Hip Adduction   x                  Hip Internal Rotation   x                  Hip External Rotation   x                  Knee Flexion   x                  Knee Extension   x                  Ankle Dorsiflexion   x                  Ankle Plantarflexion   x                     LLE 5/5 4+/5 4/5 4-/5 3+/5 3/5 3-/5 2+/5 2/5 2-/5 1/5 0 NT   Hip Flexion     x                     Hip Extension      x                    Hip Abduction      x                    Hip Adduction      x                    Hip Internal Rotation      x                    Hip External Rotation      x                    Knee Flexion    x                      Knee Extension    x                      Ankle Dorsiflexion    x                      Ankle Plantarflexion    x                           Special Tests Left Right Comments   MARCUS  positive negative    FADIR negative negative    Scour negative negative    Piriformis  positive negative    Flexibility       90/90 Hamstrings  -40° -25°      Gait Analysis   Aga Rodriges ambulated 90 feet with none device with independence assistance. Aga Rodriges displays lack of stride  gait deviation during 1 gait cycle.      Other:     Evaluation   Single Limb Stance R LE 10 sec  (<10 sec = HIGH FALL RISK)   Single Limb Stance L LE 3 sec  (<10 sec = HIGH FALL RISK)      Normative Values for Single Leg Stance, Eyes Open   Age: Time:   60-69 27 sec   70-79 17.2 sec   80-89 8.5 sec       Evaluation   30 second Chair Rise  (adults > 59 y/o) 12  completed with arms      Normative Scores for 30 sec Chair Rise (arms across chest; full  stand and full sitting)    60-64 65-69 70-74 75-79 80-84 85-89 90-94   Range for Men 14-19 12-18 12-17 11-17 10-15 8-14 7-12   Range for Women 12-17 11-16 10-16 10-15 9-14 8-13 4-11       Evaluation   Timed Up and Go <14 sec      >14 seconds associated with high fall risk  > 30 seconds predictive of requiring ambulation device & being dependent in ADLs     Table: Population Norms for TUG    Age  Average TUG    60 - 69 years  8.1 seconds    70 - 79 years  9.2 seconds    80 - 99 years  11.3 seconds          Intake Outcome Measure for FOTO left hip lumbar  Survey    Therapist reviewed FOTO scores for Aga Rodriges on 11/18/2024.   FOTO report -  see Media section or FOTO account episode details.    Limitation Score: %           TREATMENT   Treatment Time In: 1450  Treatment Time Out: 1600  Total Treatment time (time-based codes) separate from Evaluation: 10 minutes      Aga received the treatments listed below:      therapeutic exercises to develop strength, ROM, and flexibility for 10 minutes including:  Piriformis stretch with trunk flexion - 2'  Piriformis stretch with knee depression  - 2'  Hermelindo stretch with and without strap - 3'  Long sitting hamsting stretch - 2  Iliotibial band stretch               Home Exercises and Patient Education Provided    Patient Education and Home Exercises     Education provided:   Patient was provided educational information regarding: role of Physical Therapy, short and long term goals, patient/therapist expectations, scheduling, and attendance policy.    Written Home Exercises Provided: yes. Exercises were reviewed and Aga was able to demonstrate them prior to the end of the session.  Aga demonstrated fair  understanding of the education provided. See EMR under Patient Instructions for exercises provided during therapy sessions.    Assessment     Aga is a 84 y.o. female referred to outpatient Physical Therapy with a medical diagnosis of   M76.02  (ICD-10-CM) - Gluteal tendinitis of left buttock   M70.62 (ICD-10-CM) - Greater trochanteric bursitis of left hip   . Pt presents with displays of weakness, impaired endurance, impaired functional mobility, gait instability, decreased lower extremity function, pain, decreased ROM, and impaired muscle length Patient currently presents to therapy with reports of left hip pain with functional movement. Patient displays lack of hip movement in addition to lack of hip strength and will benefit from skilled therapy to address deficits.      Pt prognosis is Fair.   Patient will benefit from skilled outpatient Physical Therapy to address the deficits stated above and in the chart below, provide patient /family education, and to maximize patientt's level of independence.     Plan of care discussed with patient: Yes  Patient's spiritual, cultural and educational needs considered and patient is agreeable to the plan of care and goals as stated below:     Anticipated Barriers for therapy: None Identified during initial evaluation     Medical Necessity is demonstrated by the following  History  Co-morbidities and personal factors that may impact the plan of care [x] LOW: no personal factors / co-morbidities  [] MODERATE: 1-2 personal factors / co-morbidities  [] HIGH: 3+ personal factors / co-morbidities    Moderate / High Support Documentation:   Co-morbidities affecting plan of care:   -------------------------------------    Acid reflux    Hypertension       Personal Factors:   no deficits     Examination  Body Structures and Functions, activity limitations and participation restrictions that may impact the plan of care [x] LOW: addressing 1-2 elements  [] MODERATE: 3+ elements  [] HIGH: 4+ elements (please support below)    Moderate / High Support Documentation:   -------------------------------------    Acid reflux    Hypertension      Clinical Presentation [x] LOW: stable  [] MODERATE: Evolving  [] HIGH: Unstable      Decision Making/ Complexity Score: moderate       Goals:  Short Term Goals: 4 weeks 12/16/2024 (4)     Goal Status    Pt. to report decreased left hip pain </ = 2/10 at worst to increase tolerance for bowlign    Pt. to demonstrate increased left  hamstring flexibility via -30 degrees displayed during  90/90 hamstring flexibility testst to improve ease with stair descent.    Pt to demonstrate increased MMT for left  hip flexion and extension to 4/5 to decrease gait deviation during ambulation period.    Pt. demonstrate increased MMT for left  hip abduction  and adduction  to 4/5 to increase ease with ascending / descending stairs.     Pt to be Independent with HEP to improve ROM and independence with ADL's        Long Term Goals: 8 weeks 1/13/2025 (8)     Goal Status    Pt. to report decreased left hip pain </ = 1/10 at worst to increase tolerance for stair climbing     Pt. to demonstrate increased left hip flexor flexibility via +8 degrees displayed during ely's test to improve ease with partial squatting    Pt. to demonstrate increased left  hamstring flexibility via -25 degrees displayed during  90/90 hamstring flexibility testst to improve ease with stair descent.    Pt to demonstrate increased MMT for left  hip flexion and extension to 4+/5 to decrease gait deviation during ambulation period.    Pt. demonstrate increased MMT for left  hip abduction  and adduction  to 4+/5 to increase ease with ascending / descending stairs.     Pt to be Independent with HEP to improve ROM and independence with ADL's        Plan   Plan of care Certification: 11/18/2024 to  2/10/2025 (12)    Outpatient Physical Therapy 2 times weekly for 12 weeks to include the following interventions: Gait Training, Manual Therapy, Neuromuscular Re-ed, Patient Education, Therapeutic Activities, Therapeutic Exercise, Ultrasound, and Integrative Dry Needling  .     Brain Stone, PT,DPT  11/18/2024

## 2024-11-20 PROBLEM — M25.552 ACUTE HIP PAIN, LEFT: Status: ACTIVE | Noted: 2024-11-20

## 2024-11-20 PROBLEM — M79.18 GLUTEAL PAIN: Status: ACTIVE | Noted: 2024-11-20

## 2024-11-20 NOTE — PLAN OF CARE
"[facility]  Physical Therapy Initial Evaluation - Hip     Name: Aga Aguilar [facility] Number: [id number]    Therapy Diagnosis:   Encounter Diagnoses   Name Primary?    Gluteal tendinitis of left buttock     Greater trochanteric bursitis of left hip      Physician: Daniela Chowdary DO    Physician Orders: PT Eval and Treat   Medical Diagnosis from Referral:   M76.02 (ICD-10-CM) - Gluteal tendinitis of left buttock   M70.62 (ICD-10-CM) - Greater trochanteric bursitis of left hip     Evaluation Date: 11/18/2024  Authorization Period Expiration: 12/31/24  Plan of Care Expiration: 2/10/25 or 24 Visit  Progress Note Due: 12/18/2024  Visit # / Visits authorized: 1/ 1  Visits Remaining - 0  PTA visit #: 0/6    Precautions: Standard (Referral to outpatient PT ([facility]) for increased pelvic stability, core strengthening, gluteal strengthening and eccentric exercise program, and exercise program )    Eval Visit FOTO-  (Date/Score)   5th Visit FOTO   -  (Date/Score)   10th Visit FOTO  -  (Date/Score)   D/C FOTO          -  (Date/Score)     Time In: 1400  Time Out: 1500  Total Appointment Time (timed & untimed codes): 60 minutes        Subjective     History of current condition - Aga is a 84 y.o. year old female who presents to the [facility] PT clinic  with complaint of left hip pain following trasporting items in and out of the attic. Patient report pain in the lateral aspect, and anterior portion of the hip. Pt report onset of the symptoms occurred  2 monts prior to evaluation. Precipitating event:Injury . Current symptoms include: left hip pain. Aggravating factors: prolong sitting. Patient reports a recent injection that has decreased the left hip pain. Patients presently rates pain 0/10 on pain scale. (Pain following walking exercise ~ 1 hr    Falls: none    Mechanism of Injury: injury  Next MD Visit: TBD        Imaging:X-ray   Per radiology report "Mild degenerative change " "present in the lower spine. The osseous structures are intact without evidence of fracture or osseous destructive process. There is degenerative change of the pubic symphysis. Small soft tissue calcification seen adjacent to the left greater trochanter. Hip joints demonstrate no significant degenerative change. "    Prior Therapy: Land based therapy received for current condition   Social History: Aga lives in a single story home with 0 steps to enter and  lives alone  Assistive Devices Owned: none   Occupation: Retired   Hobbies/Exercise: bowling  Hand Dominance: right  Prior Level of Function: Independent  Current Level of Function: Modified Independent secondary to left hip pain     Pain:  Current 0/10, worst 2/10 (sit to stand increases pain), best 0/10 (since injection reduces pain)  Location: left hip   Description: Aching  Aggravating Factors: Sitting, Standing, and Walking  Easing Factors: pain medication    Pts goals: reduction in hip pain left     Medical History:   Past Medical History:   Diagnosis Date    Acid reflux     Hypertension        Surgical History:   Aga Rodriges  has a past surgical history that includes Hysterectomy; Carpal tunnel release (Left); and Percutaneous cryotherapy of peripheral nerve using liquid nitrous oxide in closed needle device (Left, 9/27/2018).    Medications:   Aga has a current medication list which includes the following prescription(s): acetaminophen, amlodipine, aspirin, ativan, atorvastatin, bacitracin, cozaar, fluticasone propionate, losartan, pantoprazole, and timolol maleate 0.5%.    Allergies:   Review of patient's allergies indicates:  No Known Allergies     Objective     Posture: Good  Palpation: mild generalized muscle tenderness  Sensation: intact to light touch  DTRs: 2+    Range of Motion/Strength:     Hip Left Right Pain/Dysfunction with Movement   AROM      Hip flexion (120)  115°   120°     Hip extension (20)      Hip abduction (45)  " 18°   30°     Hip adduction (30)  30°    40°     Hip external rotation (45)  30°   35°     Hip internal rotation (45)  45°   35°     Knee flexion (135)  110°   120°     Knee extension (0)  0°   0°           RLE 5/5 4+/5 4/5 4-/5 3+/5 3/5 3-/5 2+/5 2/5 2-/5 1/5 0 NT   Hip Flexion   x                  Hip Extension   x                  Hip Abduction   x                  Hip Adduction   x                  Hip Internal Rotation   x                  Hip External Rotation   x                  Knee Flexion   x                  Knee Extension   x                  Ankle Dorsiflexion   x                  Ankle Plantarflexion   x                     LLE 5/5 4+/5 4/5 4-/5 3+/5 3/5 3-/5 2+/5 2/5 2-/5 1/5 0 NT   Hip Flexion     x                     Hip Extension      x                    Hip Abduction      x                    Hip Adduction      x                    Hip Internal Rotation      x                    Hip External Rotation      x                    Knee Flexion    x                      Knee Extension    x                      Ankle Dorsiflexion    x                      Ankle Plantarflexion    x                           Special Tests Left Right Comments   MARCUS  positive negative    FADIR negative negative    Scour negative negative    Piriformis  positive negative    Flexibility       90/90 Hamstrings  -40° -25°      Gait Analysis   gAa Rodriges ambulated 90 feet with none device with independence assistance. Aga Rodriges displays lack of stride  gait deviation during 1 gait cycle.      Other:     Evaluation   Single Limb Stance R LE 10 sec  (<10 sec = HIGH FALL RISK)   Single Limb Stance L LE 3 sec  (<10 sec = HIGH FALL RISK)      Normative Values for Single Leg Stance, Eyes Open   Age: Time:   60-69 27 sec   70-79 17.2 sec   80-89 8.5 sec       Evaluation   30 second Chair Rise  (adults > 61 y/o) 12  completed with arms      Normative Scores for 30 sec Chair Rise (arms across chest; full  stand and full sitting)    60-64 65-69 70-74 75-79 80-84 85-89 90-94   Range for Men 14-19 12-18 12-17 11-17 10-15 8-14 7-12   Range for Women 12-17 11-16 10-16 10-15 9-14 8-13 4-11       Evaluation   Timed Up and Go <14 sec      >14 seconds associated with high fall risk  > 30 seconds predictive of requiring ambulation device & being dependent in ADLs     Table: Population Norms for TUG    Age  Average TUG    60 - 69 years  8.1 seconds    70 - 79 years  9.2 seconds    80 - 99 years  11.3 seconds          Intake Outcome Measure for FOTO left hip lumbar  Survey    Therapist reviewed FOTO scores for Aga Rodriges on 11/18/2024.   FOTO report -  see Media section or FOTO account episode details.    Limitation Score: %           TREATMENT   Treatment Time In: 1450  Treatment Time Out: 1600  Total Treatment time (time-based codes) separate from Evaluation: 10 minutes      Aga received the treatments listed below:      therapeutic exercises to develop strength, ROM, and flexibility for 10 minutes including:  Piriformis stretch with trunk flexion - 2'  Piriformis stretch with knee depression  - 2'  Hermelindo stretch with and without strap - 3'  Long sitting hamsting stretch - 2  Iliotibial band stretch               Home Exercises and Patient Education Provided    Patient Education and Home Exercises     Education provided:   Patient was provided educational information regarding: role of Physical Therapy, short and long term goals, patient/therapist expectations, scheduling, and attendance policy.    Written Home Exercises Provided: yes. Exercises were reviewed and Aga was able to demonstrate them prior to the end of the session.  Aga demonstrated fair  understanding of the education provided. See EMR under Patient Instructions for exercises provided during therapy sessions.    Assessment     Aga is a 84 y.o. female referred to outpatient Physical Therapy with a medical diagnosis of   M76.02  (ICD-10-CM) - Gluteal tendinitis of left buttock   M70.62 (ICD-10-CM) - Greater trochanteric bursitis of left hip   . Pt presents with displays of weakness, impaired endurance, impaired functional mobility, gait instability, decreased lower extremity function, pain, decreased ROM, and impaired muscle length Patient currently presents to therapy with reports of left hip pain with functional movement. Patient displays lack of hip movement in addition to lack of hip strength and will benefit from skilled therapy to address deficits.      Pt prognosis is Fair.   Patient will benefit from skilled outpatient Physical Therapy to address the deficits stated above and in the chart below, provide patient /family education, and to maximize patientt's level of independence.     Plan of care discussed with patient: Yes  Patient's spiritual, cultural and educational needs considered and patient is agreeable to the plan of care and goals as stated below:     Anticipated Barriers for therapy: None Identified during initial evaluation     Medical Necessity is demonstrated by the following  History  Co-morbidities and personal factors that may impact the plan of care [x] LOW: no personal factors / co-morbidities  [] MODERATE: 1-2 personal factors / co-morbidities  [] HIGH: 3+ personal factors / co-morbidities    Moderate / High Support Documentation:   Co-morbidities affecting plan of care:   -------------------------------------    Acid reflux    Hypertension       Personal Factors:   no deficits     Examination  Body Structures and Functions, activity limitations and participation restrictions that may impact the plan of care [x] LOW: addressing 1-2 elements  [] MODERATE: 3+ elements  [] HIGH: 4+ elements (please support below)    Moderate / High Support Documentation:   -------------------------------------    Acid reflux    Hypertension      Clinical Presentation [x] LOW: stable  [] MODERATE: Evolving  [] HIGH: Unstable      Decision Making/ Complexity Score: moderate       Goals:  Short Term Goals: 4 weeks 12/16/2024 (4)     Goal Status    Pt. to report decreased left hip pain </ = 2/10 at worst to increase tolerance for bowlign    Pt. to demonstrate increased left  hamstring flexibility via -30 degrees displayed during  90/90 hamstring flexibility testst to improve ease with stair descent.    Pt to demonstrate increased MMT for left  hip flexion and extension to 4/5 to decrease gait deviation during ambulation period.    Pt. demonstrate increased MMT for left  hip abduction  and adduction  to 4/5 to increase ease with ascending / descending stairs.     Pt to be Independent with HEP to improve ROM and independence with ADL's        Long Term Goals: 8 weeks 1/13/2025 (8)     Goal Status    Pt. to report decreased left hip pain </ = 1/10 at worst to increase tolerance for stair climbing     Pt. to demonstrate increased left hip flexor flexibility via +8 degrees displayed during ely's test to improve ease with partial squatting    Pt. to demonstrate increased left  hamstring flexibility via -25 degrees displayed during  90/90 hamstring flexibility testst to improve ease with stair descent.    Pt to demonstrate increased MMT for left  hip flexion and extension to 4+/5 to decrease gait deviation during ambulation period.    Pt. demonstrate increased MMT for left  hip abduction  and adduction  to 4+/5 to increase ease with ascending / descending stairs.     Pt to be Independent with HEP to improve ROM and independence with ADL's        Plan   Plan of care Certification: 11/18/2024 to  2/10/2025 (12)    Outpatient Physical Therapy 2 times weekly for 12 weeks to include the following interventions: Gait Training, Manual Therapy, Neuromuscular Re-ed, Patient Education, Therapeutic Activities, Therapeutic Exercise, Ultrasound, and Integrative Dry Needling .     Brain Stone, PT,DPT  11/18/2024

## 2024-11-25 ENCOUNTER — CLINICAL SUPPORT (OUTPATIENT)
Dept: REHABILITATION | Facility: HOSPITAL | Age: 84
End: 2024-11-25
Payer: MEDICARE

## 2024-11-25 DIAGNOSIS — M25.552 ACUTE HIP PAIN, LEFT: ICD-10-CM

## 2024-11-25 DIAGNOSIS — M79.18 GLUTEAL PAIN: Primary | ICD-10-CM

## 2024-11-25 PROCEDURE — 97110 THERAPEUTIC EXERCISES: CPT | Mod: PN,CQ

## 2024-11-25 NOTE — PROGRESS NOTES
[facility]   Physical Therapy Treatment Note        Name: Aga Aguilar [facility] Number: [id number]    Therapy Diagnosis:   Encounter Diagnoses   Name Primary?    Gluteal pain Yes    Acute hip pain, left      Physician: Daniela Chowdary DO    Visit Date: 11/25/2024  Last Scheduled Visit:     Physician Orders: PT Eval and Treat   Medical Diagnosis from Referral:   M76.02 (ICD-10-CM) - Gluteal tendinitis of left buttock   M70.62 (ICD-10-CM) - Greater trochanteric bursitis of left hip      Evaluation Date: 11/18/2024  Authorization Period Expiration: 12/31/24  Plan of Care Expiration: 2/10/25 or 24 Visit  Progress Note Due: 12/18/2024  Visit # / Visits authorized: 1/ 20  Visits Remaining - 8  PTA visit #: 1/6    Time In: 3:00  Time Out: 4:00  Billable Time: 60 minutes    Insurance: Payor: MEDICARE / Plan: MEDICARE PART A & B / Product Type: Government /     Subjective     Pt reports: No pain today.  She was compliant with home exercise program.  Response to previous treatment: 1st session  Functional change: 1st session     Pain: 0/10  Location: left hip      Objective     Objective Measures Performed  - Evaluation     Treatment      Aga received the treatments listed below (Bold exercise performed during 11/25/2024 visit):      therapeutic exercises to develop strength, endurance, ROM, and flexibility for 60 minutes including:    Warm-up      Supine    Hip add 3x10  Hip abd GTB 3x10  Straight Leg Raise 3x10  Glue sets 3x10  Marches w/ GTB 3x10    Seated    Piriformis stretch with trunk flexion - 2'  Piriformis stretch with knee depression  - 2'  Hermelindo stretch with and without strap - 3'  Long sitting hamsting stretch - 2  Iliotibial band stretch   Hamstring stretch 3x30s  Bike - 10'  Sit <> stand 3x10    Standing              Home Exercises Provided and Patient Education Provided     Education provided:   - Continue with HEP    Written Home Exercises Provided: yes. Exercises  were reviewed and Aga was able to demonstrate them prior to the end of the session.  Aga demonstrated fair  understanding of the education provided. See EMR under Patient Instructions for exercises provided during therapy sessions    Assessment     Pt tolerated session well. She presented w/o pain and had no difficulty w/ exercises today. Some cramping in L hamstring while hooklying but no pain in glute w/ exercises. Pt had no adverse effects w/ exercises and will continue to benefit from skilled therapy.    Aga Is progressing well towards her goals.   Pt prognosis is Fair.     Pt will continue to benefit from skilled outpatient physical therapy to address the deficits listed in the problem list box on initial evaluation, provide pt/family education and to maximize pt's level of independence in the home and community environment.     Pt's spiritual, cultural and educational needs considered and pt agreeable to plan of care and goals.    Anticipated barriers to physical therapy: None Identified at eval    Goals:  Short Term Goals: 4 weeks 12/16/2024 (4)      Goal Status    Pt. to report decreased left hip pain </ = 2/10 at worst to increase tolerance for bowlign     Pt. to demonstrate increased left  hamstring flexibility via -30 degrees displayed during  90/90 hamstring flexibility testst to improve ease with stair descent.     Pt to demonstrate increased MMT for left  hip flexion and extension to 4/5 to decrease gait deviation during ambulation period.     Pt. demonstrate increased MMT for left  hip abduction  and adduction  to 4/5 to increase ease with ascending / descending stairs.      Pt to be Independent with HEP to improve ROM and independence with ADL's           Long Term Goals: 8 weeks 1/13/2025 (8)      Goal Status    Pt. to report decreased left hip pain </ = 1/10 at worst to increase tolerance for stair climbing      Pt. to demonstrate increased left hip flexor flexibility via +8 degrees  displayed during ely's test to improve ease with partial squatting     Pt. to demonstrate increased left  hamstring flexibility via -25 degrees displayed during  90/90 hamstring flexibility testst to improve ease with stair descent.     Pt to demonstrate increased MMT for left  hip flexion and extension to 4+/5 to decrease gait deviation during ambulation period.     Pt. demonstrate increased MMT for left  hip abduction  and adduction  to 4+/5 to increase ease with ascending / descending stairs.      Pt to be Independent with HEP to improve ROM and independence with ADL's          Plan     Continued with current POC      Emir Morelos, PTA   11/25/2024

## 2024-12-09 ENCOUNTER — CLINICAL SUPPORT (OUTPATIENT)
Dept: REHABILITATION | Facility: HOSPITAL | Age: 84
End: 2024-12-09
Payer: MEDICARE

## 2024-12-09 DIAGNOSIS — M79.18 GLUTEAL PAIN: Primary | ICD-10-CM

## 2024-12-09 DIAGNOSIS — M25.552 ACUTE HIP PAIN, LEFT: ICD-10-CM

## 2024-12-09 PROCEDURE — 97110 THERAPEUTIC EXERCISES: CPT | Mod: PN,CQ

## 2024-12-09 NOTE — PROGRESS NOTES
[facility]   Physical Therapy Treatment Note        Name: Aga Aguilar [facility] Number: [id number]    Therapy Diagnosis:   Encounter Diagnoses   Name Primary?    Gluteal pain Yes    Acute hip pain, left      Physician: Daniela Chowdary DO    Visit Date: 12/9/2024  Last Scheduled Visit:     Physician Orders: PT Eval and Treat   Medical Diagnosis from Referral:   M76.02 (ICD-10-CM) - Gluteal tendinitis of left buttock   M70.62 (ICD-10-CM) - Greater trochanteric bursitis of left hip      Evaluation Date: 11/18/2024  Authorization Period Expiration: 12/31/24  Plan of Care Expiration: 2/10/25 or 24 Visit  Progress Note Due: 12/18/2024  Visit # / Visits authorized: 2/ 20  Visits Remaining - 7  PTA visit #: 2/6    Time In: 8:00  Time Out: 9:00  Billable Time: 60 minutes / 30 min billed    Insurance: Payor: MEDICARE / Plan: MEDICARE PART A & B / Product Type: Government /     Subjective     Pt reports: A little sore from drive from [facility] to [place]  She was compliant with home exercise program.  Response to previous treatment: none stated  Functional change: Nones stated     Pain: 0/10  Location: left hip      Objective     Objective Measures Performed  - Evaluation     Treatment      Aga received the treatments listed below (Bold exercise performed during 12/9/2024 visit):      therapeutic exercises to develop strength, endurance, ROM, and flexibility for 60 minutes including:    Warm-up      Supine    Hip add 3x10  Hip abd GTB 3x10  Straight Leg Raise 3x10-  Glue sets 3x10  Marches w/ GTB 3x10  Bridges 3x10    Seated    Piriformis stretch with trunk flexion - 2'  Piriformis stretch with knee depression  - 2'  Hermelindo stretch with and without strap - 3'  Long sitting hamsting stretch - 2  Iliotibial band stretch   B Hamstring stretch 3x30s  B Piriformis stretch 3x30s   Bike - 10'  Sit <> stand 3x10    Standing              Home Exercises Provided and Patient Education Provided      Education provided:   - Continue with HEP    Written Home Exercises Provided: yes. Exercises were reviewed and Aga was able to demonstrate them prior to the end of the session.  Aga demonstrated fair  understanding of the education provided. See EMR under Patient Instructions for exercises provided during therapy sessions    Assessment     Pt tolerated session well. Session focused on reducing stiffness and discomfort following pt's drive from [place]. She reported relief and reduced discomfort at end of session. Aga will continue to benefit from skilled therapy.     Aga Is progressing well towards her goals.   Pt prognosis is Fair.     Pt will continue to benefit from skilled outpatient physical therapy to address the deficits listed in the problem list box on initial evaluation, provide pt/family education and to maximize pt's level of independence in the home and community environment.     Pt's spiritual, cultural and educational needs considered and pt agreeable to plan of care and goals.    Anticipated barriers to physical therapy: None Identified at eval    Goals:  Short Term Goals: 4 weeks 12/16/2024 (4)      Goal Status    Pt. to report decreased left hip pain </ = 2/10 at worst to increase tolerance for bowlign     Pt. to demonstrate increased left  hamstring flexibility via -30 degrees displayed during  90/90 hamstring flexibility testst to improve ease with stair descent.     Pt to demonstrate increased MMT for left  hip flexion and extension to 4/5 to decrease gait deviation during ambulation period.     Pt. demonstrate increased MMT for left  hip abduction  and adduction  to 4/5 to increase ease with ascending / descending stairs.      Pt to be Independent with HEP to improve ROM and independence with ADL's           Long Term Goals: 8 weeks 1/13/2025 (8)      Goal Status    Pt. to report decreased left hip pain </ = 1/10 at worst to increase tolerance for stair climbing      Pt. to  demonstrate increased left hip flexor flexibility via +8 degrees displayed during ely's test to improve ease with partial squatting     Pt. to demonstrate increased left  hamstring flexibility via -25 degrees displayed during  90/90 hamstring flexibility testst to improve ease with stair descent.     Pt to demonstrate increased MMT for left  hip flexion and extension to 4+/5 to decrease gait deviation during ambulation period.     Pt. demonstrate increased MMT for left  hip abduction  and adduction  to 4+/5 to increase ease with ascending / descending stairs.      Pt to be Independent with HEP to improve ROM and independence with ADL's          Plan     Continued with current POC      Emir Morelos, PTA   12/9/2024

## 2024-12-11 ENCOUNTER — CLINICAL SUPPORT (OUTPATIENT)
Dept: REHABILITATION | Facility: HOSPITAL | Age: 84
End: 2024-12-11
Payer: MEDICARE

## 2024-12-11 DIAGNOSIS — M79.18 GLUTEAL PAIN: Primary | ICD-10-CM

## 2024-12-11 DIAGNOSIS — M25.552 ACUTE HIP PAIN, LEFT: ICD-10-CM

## 2024-12-11 PROCEDURE — 97110 THERAPEUTIC EXERCISES: CPT | Mod: PN,CQ

## 2024-12-11 NOTE — PROGRESS NOTES
[facility]   Physical Therapy Treatment Note        Name: Aga Aguilar [facility] Number: [id number]    Therapy Diagnosis:   Encounter Diagnoses   Name Primary?    Gluteal pain Yes    Acute hip pain, left      Physician: Daniela Chowdary DO    Visit Date: 12/11/2024  Last Scheduled Visit:     Physician Orders: PT Eval and Treat   Medical Diagnosis from Referral:   M76.02 (ICD-10-CM) - Gluteal tendinitis of left buttock   M70.62 (ICD-10-CM) - Greater trochanteric bursitis of left hip      Evaluation Date: 11/18/2024  Authorization Period Expiration: 12/31/24  Plan of Care Expiration: 2/10/25 or 24 Visit  Progress Note Due: 12/18/2024  Visit # / Visits authorized: 3/ 20  Visits Remaining - 6  PTA visit #: 3/6    Time In: 9:00  Time Out: 10:00  Billable Time: 60 minutes / 30 min billed    Insurance: Payor: MEDICARE / Plan: MEDICARE PART A & B / Product Type: Government /     Subjective     Pt reports: No pain today  She was compliant with home exercise program.  Response to previous treatment: none stated  Functional change: Nones stated     Pain: 0/10  Location: left hip      Objective     Objective Measures Performed  - Evaluation     Treatment      Aga received the treatments listed below (Bold exercise performed during 12/11/2024 visit):      therapeutic exercises to develop strength, endurance, ROM, and flexibility for 60 minutes including:    Warm-up      Supine    Hip add 3x10  Hip abd BTB 3x10  Straight Leg Raise 3x10 #1.5  Glue sets 3x10  Marches w/ GTB 3x10  Bridges 3x10    Seated    Piriformis stretch with trunk flexion - 2'  Piriformis stretch with knee depression  - 2'  Hermelindo stretch with and without strap - 3'  Long sitting hamsting stretch - 2  Iliotibial band stretch   B Hamstring stretch 3x30s  B Piriformis stretch 3x30s   Bike - 10'  Sit <> stand 3x10    Standing    Hip abduction 3x10  Hip ext 3x10          Home Exercises Provided and Patient Education Provided      Education provided:   - Continue with HEP    Written Home Exercises Provided: yes. Exercises were reviewed and Aga was able to demonstrate them prior to the end of the session.  Aga demonstrated fair  understanding of the education provided. See EMR under Patient Instructions for exercises provided during therapy sessions    Assessment     Pt tolerated session well. Pt reports no pain in left buttock or hip. Hip abduction and hip ext added to improve hip strength and mobility. Weight added to Straight Leg Raise to improve hip flexor strength. Aga had no adverse effects w/ exercises and will continue to benefit from skilled therapy.     Aga Is progressing well towards her goals.   Pt prognosis is Fair.     Pt will continue to benefit from skilled outpatient physical therapy to address the deficits listed in the problem list box on initial evaluation, provide pt/family education and to maximize pt's level of independence in the home and community environment.     Pt's spiritual, cultural and educational needs considered and pt agreeable to plan of care and goals.    Anticipated barriers to physical therapy: None Identified at eval    Goals:  Short Term Goals: 4 weeks 12/16/2024 (4)      Goal Status    Pt. to report decreased left hip pain </ = 2/10 at worst to increase tolerance for bowlign     Pt. to demonstrate increased left  hamstring flexibility via -30 degrees displayed during  90/90 hamstring flexibility testst to improve ease with stair descent.     Pt to demonstrate increased MMT for left  hip flexion and extension to 4/5 to decrease gait deviation during ambulation period.     Pt. demonstrate increased MMT for left  hip abduction  and adduction  to 4/5 to increase ease with ascending / descending stairs.      Pt to be Independent with HEP to improve ROM and independence with ADL's           Long Term Goals: 8 weeks 1/13/2025 (8)      Goal Status    Pt. to report decreased left hip pain </  = 1/10 at worst to increase tolerance for stair climbing      Pt. to demonstrate increased left hip flexor flexibility via +8 degrees displayed during ely's test to improve ease with partial squatting     Pt. to demonstrate increased left  hamstring flexibility via -25 degrees displayed during  90/90 hamstring flexibility testst to improve ease with stair descent.     Pt to demonstrate increased MMT for left  hip flexion and extension to 4+/5 to decrease gait deviation during ambulation period.     Pt. demonstrate increased MMT for left  hip abduction  and adduction  to 4+/5 to increase ease with ascending / descending stairs.      Pt to be Independent with HEP to improve ROM and independence with ADL's          Plan     Continued with current POC      Emir Morelos, PTA   12/11/2024

## 2024-12-16 ENCOUNTER — CLINICAL SUPPORT (OUTPATIENT)
Dept: REHABILITATION | Facility: HOSPITAL | Age: 84
End: 2024-12-16
Payer: MEDICARE

## 2024-12-16 DIAGNOSIS — M79.18 GLUTEAL PAIN: Primary | ICD-10-CM

## 2024-12-16 DIAGNOSIS — M25.552 ACUTE HIP PAIN, LEFT: ICD-10-CM

## 2024-12-16 PROCEDURE — 97110 THERAPEUTIC EXERCISES: CPT | Mod: PN,CQ

## 2024-12-16 NOTE — PROGRESS NOTES
[facility]   Physical Therapy Treatment Note        Name: Aga Aguilar [facility] Number: [id number]    Therapy Diagnosis:   Encounter Diagnoses   Name Primary?    Gluteal pain Yes    Acute hip pain, left      Physician: Daniela Chowdary DO    Visit Date: 12/16/2024  Last Scheduled Visit:     Physician Orders: PT Eval and Treat   Medical Diagnosis from Referral:   M76.02 (ICD-10-CM) - Gluteal tendinitis of left buttock   M70.62 (ICD-10-CM) - Greater trochanteric bursitis of left hip      Evaluation Date: 11/18/2024  Authorization Period Expiration: 12/31/24  Plan of Care Expiration: 2/10/25 or 24 Visit  Progress Note Due: 12/18/2024  Visit # / Visits authorized: 4/ 20  Visits Remaining - 4  PTA visit #: 4/6    Time In: 9:00  Time Out: 10:00  Billable Time: 60 minutes     Insurance: Payor: MEDICARE / Plan: MEDICARE PART A & B / Product Type: Government /     Subjective     Pt reports: No pain today (con)  She was compliant with home exercise program.  Response to previous treatment: none stated  Functional change: Nones stated     Pain: 0/10  Location: left hip      Objective     Objective Measures Performed  - Evaluation     Treatment      Aga received the treatments listed below (Bold exercise performed during 12/16/2024 visit):      therapeutic exercises to develop strength, endurance, ROM, and flexibility for 60 minutes including:    Warm-up      Supine    Hip add 3x10  Hip abd BTB 3x10  Straight Leg Raise 3x10 #1.5  Glue sets 3x10  Marches w/ GTB 3x10  Bridges 3x10    Seated    Piriformis stretch with trunk flexion - 2'  Piriformis stretch with knee depression  - 2'  Hermelindo stretch with and without strap - 3'  Long sitting hamsting stretch - 2  Iliotibial band stretch   B Hamstring stretch 3x30s  B Piriformis stretch 3x30s   Bike - 10'  Sit <> stand 3x10  Sciatic nerve glides 3x10    Standing    Hip abduction 3x10  Hip ext 3x10          Home Exercises Provided and Patient  Education Provided     Education provided:   - Continue with HEP    Written Home Exercises Provided: yes. Exercises were reviewed and Aga was able to demonstrate them prior to the end of the session.  Aga demonstrated fair  understanding of the education provided. See EMR under Patient Instructions for exercises provided during therapy sessions    Assessment     Pt tolerated session well. She presented w/some pain in low back that resolved with exercise. Sciatic nerve glides added to reduce radicular symptoms in leg. Aga will continue to benefit from skilled therapy.      Aga Is progressing well towards her goals.   Pt prognosis is Fair.     Pt will continue to benefit from skilled outpatient physical therapy to address the deficits listed in the problem list box on initial evaluation, provide pt/family education and to maximize pt's level of independence in the home and community environment.     Pt's spiritual, cultural and educational needs considered and pt agreeable to plan of care and goals.    Anticipated barriers to physical therapy: None Identified at eval    Goals:  Short Term Goals: 4 weeks 12/16/2024 (4)      Goal Status    Pt. to report decreased left hip pain </ = 2/10 at worst to increase tolerance for bowlign     Pt. to demonstrate increased left  hamstring flexibility via -30 degrees displayed during  90/90 hamstring flexibility testst to improve ease with stair descent.     Pt to demonstrate increased MMT for left  hip flexion and extension to 4/5 to decrease gait deviation during ambulation period.     Pt. demonstrate increased MMT for left  hip abduction  and adduction  to 4/5 to increase ease with ascending / descending stairs.      Pt to be Independent with HEP to improve ROM and independence with ADL's           Long Term Goals: 8 weeks 1/13/2025 (8)      Goal Status    Pt. to report decreased left hip pain </ = 1/10 at worst to increase tolerance for stair climbing      Pt. to  demonstrate increased left hip flexor flexibility via +8 degrees displayed during ely's test to improve ease with partial squatting     Pt. to demonstrate increased left  hamstring flexibility via -25 degrees displayed during  90/90 hamstring flexibility testst to improve ease with stair descent.     Pt to demonstrate increased MMT for left  hip flexion and extension to 4+/5 to decrease gait deviation during ambulation period.     Pt. demonstrate increased MMT for left  hip abduction  and adduction  to 4+/5 to increase ease with ascending / descending stairs.      Pt to be Independent with HEP to improve ROM and independence with ADL's          Plan     Continued with current POC      Emir Morelos, PTA   12/16/2024

## 2024-12-18 ENCOUNTER — CLINICAL SUPPORT (OUTPATIENT)
Dept: REHABILITATION | Facility: HOSPITAL | Age: 84
End: 2024-12-18
Payer: MEDICARE

## 2024-12-18 DIAGNOSIS — M79.18 GLUTEAL PAIN: Primary | ICD-10-CM

## 2024-12-18 DIAGNOSIS — M25.552 ACUTE HIP PAIN, LEFT: ICD-10-CM

## 2024-12-18 PROCEDURE — 97110 THERAPEUTIC EXERCISES: CPT | Mod: PN

## 2024-12-18 PROCEDURE — 97112 NEUROMUSCULAR REEDUCATION: CPT | Mod: PN

## 2024-12-18 NOTE — PROGRESS NOTES
[facility]   Physical Therapy Treatment Note        Name: Aga Aguilar [facility] Number: [id number]    Therapy Diagnosis:   Encounter Diagnoses   Name Primary?    Gluteal pain Yes    Acute hip pain, left      Physician: Daniela Chowdary DO    Visit Date: 12/18/2024  Last Scheduled Visit:     Physician Orders: PT Eval and Treat   Medical Diagnosis from Referral:   M76.02 (ICD-10-CM) - Gluteal tendinitis of left buttock   M70.62 (ICD-10-CM) - Greater trochanteric bursitis of left hip      Evaluation Date: 11/18/2024  Authorization Period Expiration: 12/31/24  Plan of Care Expiration: 2/10/25 or 24 Visit  Progress Note Due: 1/18/2025  Visit # / Visits authorized: 5/ 20  Visits Remaining - 4  PTA visit #: 0/6    Time In: 9:00  Time Out: 10:00  Billable Time: 60 minutes     Insurance: Payor: MEDICARE / Plan: MEDICARE PART A & B / Product Type: Government /     Subjective     Pt reports: No pain today (con)  She was compliant with home exercise program.  Response to previous treatment: none stated  Functional change: Nones stated     Pain: 0/10  Location: left hip      Objective     Objective Measures Performed  - 12/18/24    Range of Motion/Strength:      Hip Left Right Pain/Dysfunction with Movement   AROM         Hip flexion (120)  115°   120°      Hip extension (20)         Hip abduction (45)  35°   30°      Hip adduction (30)  35°    40°      Hip external rotation (45)  40°   35°      Hip internal rotation (45)  45°   35°      Knee flexion (135)  120°   120°      Knee extension (0)  0°   0°               RLE 5/5 4+/5 4/5 4-/5 3+/5 3/5 3-/5 2+/5 2/5 2-/5 1/5 0 NT   Hip Flexion     x                       Hip Extension     x                       Hip Abduction     x                       Hip Adduction     x                       Hip Internal Rotation     x                       Hip External Rotation     x                       Knee Flexion     x                       Knee Extension      x                       Ankle Dorsiflexion     x                       Ankle Plantarflexion     x                          LLE 5/5 4+/5 4/5 4-/5 3+/5 3/5 3-/5 2+/5 2/5 2-/5 1/5 0 NT   Hip Flexion      x                      Hip Extension      x                      Hip Abduction        x                     Hip Adduction        x                     Hip Internal Rotation        x                     Hip External Rotation        x                     Knee Flexion      x                       Knee Extension      x                       Ankle Dorsiflexion      x                       Ankle Plantarflexion      x                             Special Tests Left Right Comments   MARCUS  Negative negative     FADIR negative negative     Scour negative negative     Piriformis  positive negative     Flexibility          90/90 Hamstrings  -15° -25°        Gait Analysis   Aga Rodriges ambulated 90 feet with none device with independence assistance. Aga Rodriges displays lack of stride  gait deviation during 1 gait cycle.       Other:     Evaluation   Single Limb Stance R LE 15 sec  (<10 sec = HIGH FALL RISK)   Single Limb Stance L LE 19 sec  (<10 sec = HIGH FALL RISK)      Normative Values for Single Leg Stance, Eyes Open   Age: Time:   60-69 27 sec   70-79 17.2 sec   80-89 8.5 sec        Evaluation   30 second Chair Rise  (adults > 59 y/o) 12  completed with arms      Normative Scores for 30 sec Chair Rise (arms across chest; full stand and full sitting)    60-64 65-69 70-74 75-79 80-84 85-89 90-94   Range for Men 14-19 12-18 12-17 11-17 10-15 8-14 7-12   Range for Women 12-17 11-16 10-16 10-15 9-14 8-13 4-11        Evaluation   Timed Up and Go <14 sec      >14 seconds associated with high fall risk  > 30 seconds predictive of requiring ambulation device & being dependent in ADLs     Table: Population Norms for TUG    Age  Average TUG    60 - 69 years  8.1 seconds    70 - 79 years  9.2 seconds    80 - 99  years  11.3 seconds        Treatment      Aga received the treatments listed below (Bold exercise performed during 12/18/2024 visit):      therapeutic exercises to develop strength, endurance, ROM, and flexibility for 60 minutes including:    Warm-up      Supine    Hip add 3x10  Hip abd BTB 3x10  Straight Leg Raise 3x10 #1.5  Glue sets 3x10  Marches w/ GTB 3x10  Bridges 3x10    Seated    Piriformis stretch with trunk flexion - 2'  Piriformis stretch with knee depression  - 2'  Hermelindo stretch with and without strap - 3'  Long sitting hamsting stretch - 2  Iliotibial band stretch   B Hamstring stretch 3x30s  B Piriformis stretch 3x30s   Bike - 10'  Sit <> stand 3x10  Sciatic nerve glides 3x10    Standing    Hip abduction 3x10  Hip ext 3x10      Aga participated in neuromuscular re-education activities to improve: Sense for 10 minutes. The following activities were included:  Sciatic nerve glides -3'  Standing - hip abduction and extension on pad - 3'/3'       Home Exercises Provided and Patient Education Provided     Education provided:   - Continue with HEP    Written Home Exercises Provided: yes. Exercises were reviewed and Aga was able to demonstrate them prior to the end of the session.  Aga demonstrated fair  understanding of the education provided. See EMR under Patient Instructions for exercises provided during therapy sessions    Assessment     Patient displays an improvement in left hip mobility and partial strength improvement in hip flexion and extension of the hip. Patient continues to lack hip abduction strength with manual muscle test testing. Patient will continue to benefit from skilled therapy to address current deficits.        Aga Is progressing well towards her goals.   Pt prognosis is Fair.     Pt will continue to benefit from skilled outpatient physical therapy to address the deficits listed in the problem list box on initial evaluation, provide pt/family education and to  maximize pt's level of independence in the home and community environment.     Pt's spiritual, cultural and educational needs considered and pt agreeable to plan of care and goals.    Anticipated barriers to physical therapy: None Identified at eval    Goals:  Short Term Goals: 4 weeks 12/16/2024 (4)      Goal Status    Pt. to report decreased left hip pain </ = 2/10 at worst to increase tolerance for bowlign progressing    Pt. to demonstrate increased left  hamstring flexibility via -30 degrees displayed during  90/90 hamstring flexibility testst to improve ease with stair descent. progressing   Pt to demonstrate increased MMT for left  hip flexion and extension to 4/5 to decrease gait deviation during ambulation period. progressing    Pt. demonstrate increased MMT for left  hip abduction  and adduction  to 4/5 to increase ease with ascending / descending stairs.   progressing   Pt to be Independent with HEP to improve ROM and independence with ADL's progressing          Long Term Goals: 8 weeks 1/13/2025 (8)      Goal Status    Pt. to report decreased left hip pain </ = 1/10 at worst to increase tolerance for stair climbing      Pt. to demonstrate increased left hip flexor flexibility via +8 degrees displayed during ely's test to improve ease with partial squatting     Pt. to demonstrate increased left  hamstring flexibility via -25 degrees displayed during  90/90 hamstring flexibility testst to improve ease with stair descent.     Pt to demonstrate increased MMT for left  hip flexion and extension to 4+/5 to decrease gait deviation during ambulation period.     Pt. demonstrate increased MMT for left  hip abduction  and adduction  to 4+/5 to increase ease with ascending / descending stairs.      Pt to be Independent with HEP to improve ROM and independence with ADL's          Plan     Continued with current POC      Brain Stone, PT   12/18/2024

## 2024-12-23 ENCOUNTER — CLINICAL SUPPORT (OUTPATIENT)
Dept: REHABILITATION | Facility: HOSPITAL | Age: 84
End: 2024-12-23
Payer: MEDICARE

## 2024-12-23 DIAGNOSIS — M25.552 ACUTE HIP PAIN, LEFT: ICD-10-CM

## 2024-12-23 DIAGNOSIS — M79.18 GLUTEAL PAIN: Primary | ICD-10-CM

## 2024-12-23 PROCEDURE — 97110 THERAPEUTIC EXERCISES: CPT | Mod: PN,CQ

## 2024-12-23 NOTE — PROGRESS NOTES
[facility]   Physical Therapy Treatment Note        Name: Aga Aguilar [facility] Number: [id number]    Therapy Diagnosis:   Encounter Diagnoses   Name Primary?    Gluteal pain Yes    Acute hip pain, left      Physician: Daniela Chowdary DO    Visit Date: 12/23/2024  Last Scheduled Visit:     Physician Orders: PT Eval and Treat   Medical Diagnosis from Referral:   M76.02 (ICD-10-CM) - Gluteal tendinitis of left buttock   M70.62 (ICD-10-CM) - Greater trochanteric bursitis of left hip      Evaluation Date: 11/18/2024  Authorization Period Expiration: 12/31/24  Plan of Care Expiration: 2/10/25 or 24 Visit  Progress Note Due: 1/18/2025  Visit # / Visits authorized: 6/ 20  Visits Remaining - 3  PTA visit #: 1/6    Time In: 9:00  Time Out: 10:00  Billable Time: 60 minutes     Insurance: Payor: MEDICARE / Plan: MEDICARE PART A & B / Product Type: Government /     Subjective     Pt reports: No pain today (con)  She was compliant with home exercise program.  Response to previous treatment: none stated  Functional change: Nones stated     Pain: 0/10  Location: left hip      Objective     Objective Measures Performed  - 12/18/24         Treatment      Aga received the treatments listed below (Bold exercise performed during 12/23/2024 visit):      therapeutic exercises to develop strength, endurance, ROM, and flexibility for 60 minutes including:    Warm-up      Supine    Hip add 3x10  Hip abd BTB 3x10  Straight Leg Raise 3x10 #2  Glue sets 3x10  Marches w/ GTB 3x10  Bridges 3x10  Reverse Abs 3x10    Seated    Piriformis stretch with trunk flexion - 2'  Piriformis stretch with knee depression  - 2'  Hermelindo stretch with and without strap - 3'  Long sitting hamsting stretch - 2  Iliotibial band stretch   B Hamstring stretch 3x30s  B Piriformis stretch 3x30s   Bike - 10'  Sit <> stand 3x10  Sciatic nerve glides 3x10    Standing    Hip abduction 3x10  Hip ext 3x10  Sit <> stands  3x10      Aga participated in neuromuscular re-education activities to improve: Sense for 00 minutes. The following activities were included:  Sciatic nerve glides -3'  Standing - hip abduction and extension on pad - 3'/3'       Home Exercises Provided and Patient Education Provided     Education provided:   - Continue with HEP    Written Home Exercises Provided: yes. Exercises were reviewed and Aga was able to demonstrate them prior to the end of the session.  Aga demonstrated fair  understanding of the education provided. See EMR under Patient Instructions for exercises provided during therapy sessions    Assessment     Pt tolerated session well. She continues to present w/o pain. Weight increased for Straight Leg Raise. Sit <> stands added to strengthen LE. Pt had no adverse effects w/ exercises and will continue to benefit from skilled therapy.     Aga Is progressing well towards her goals.   Pt prognosis is Fair.     Pt will continue to benefit from skilled outpatient physical therapy to address the deficits listed in the problem list box on initial evaluation, provide pt/family education and to maximize pt's level of independence in the home and community environment.     Pt's spiritual, cultural and educational needs considered and pt agreeable to plan of care and goals.    Anticipated barriers to physical therapy: None Identified at eval    Goals:  Short Term Goals: 4 weeks 12/16/2024 (4)      Goal Status    Pt. to report decreased left hip pain </ = 2/10 at worst to increase tolerance for bowlign progressing    Pt. to demonstrate increased left  hamstring flexibility via -30 degrees displayed during  90/90 hamstring flexibility testst to improve ease with stair descent. progressing   Pt to demonstrate increased MMT for left  hip flexion and extension to 4/5 to decrease gait deviation during ambulation period. progressing    Pt. demonstrate increased MMT for left  hip abduction  and adduction   to 4/5 to increase ease with ascending / descending stairs.   progressing   Pt to be Independent with HEP to improve ROM and independence with ADL's progressing          Long Term Goals: 8 weeks 1/13/2025 (8)      Goal Status    Pt. to report decreased left hip pain </ = 1/10 at worst to increase tolerance for stair climbing      Pt. to demonstrate increased left hip flexor flexibility via +8 degrees displayed during ely's test to improve ease with partial squatting     Pt. to demonstrate increased left  hamstring flexibility via -25 degrees displayed during  90/90 hamstring flexibility testst to improve ease with stair descent.     Pt to demonstrate increased MMT for left  hip flexion and extension to 4+/5 to decrease gait deviation during ambulation period.     Pt. demonstrate increased MMT for left  hip abduction  and adduction  to 4+/5 to increase ease with ascending / descending stairs.      Pt to be Independent with HEP to improve ROM and independence with ADL's          Plan     Continued with current POC      Emir Morelos, PTA   12/23/2024

## 2024-12-27 ENCOUNTER — CLINICAL SUPPORT (OUTPATIENT)
Dept: REHABILITATION | Facility: HOSPITAL | Age: 84
End: 2024-12-27
Payer: MEDICARE

## 2024-12-27 DIAGNOSIS — M79.18 GLUTEAL PAIN: Primary | ICD-10-CM

## 2024-12-27 DIAGNOSIS — M25.552 ACUTE HIP PAIN, LEFT: ICD-10-CM

## 2024-12-27 PROCEDURE — 97110 THERAPEUTIC EXERCISES: CPT | Mod: PN,CQ

## 2024-12-27 NOTE — PROGRESS NOTES
[facility]   Physical Therapy Treatment Note        Name: Aga Aguilar [facility] Number: [id number]    Therapy Diagnosis:   Encounter Diagnoses   Name Primary?    Gluteal pain Yes    Acute hip pain, left      Physician: Daniela Chowdary DO    Visit Date: 12/27/2024  Last Scheduled Visit:     Physician Orders: PT Eval and Treat   Medical Diagnosis from Referral:   M76.02 (ICD-10-CM) - Gluteal tendinitis of left buttock   M70.62 (ICD-10-CM) - Greater trochanteric bursitis of left hip      Evaluation Date: 11/18/2024  Authorization Period Expiration: 12/31/24  Plan of Care Expiration: 2/10/25 or 24 Visit  Progress Note Due: 1/18/2025  Visit # / Visits authorized: 6/ 20  Visits Remaining - 3  PTA visit #: 1/6    Time In: 9:05  Time Out: 10:00  Billable Time: 55 minutes     Insurance: Payor: MEDICARE / Plan: MEDICARE PART A & B / Product Type: Government /     Subjective     Pt reports: having no pain today.  She was compliant with home exercise program.  Response to previous treatment: none stated  Functional change: Nones stated     Pain: 0/10  Location: left hip      Objective     Objective Measures Performed  - 12/18/24         Treatment      Aga received the treatments listed below (Bold exercise performed during 12/27/2024 visit):      therapeutic exercises to develop strength, endurance, ROM, and flexibility for 55 minutes including:    Warm-up      Supine    Hip add 3x10  Hip abd BTB 3x10  Straight Leg Raise 3x10 #2  Glue sets 3x10  Marches w/ GTB 3x10  Bridges 3x10  Reverse Abs 3x10    Seated    Piriformis stretch with trunk flexion - 2'  Piriformis stretch with knee depression  - 2'  Hermelindo stretch with and without strap - 3'  Long sitting hamsting stretch - 2  Iliotibial band stretch   B Hamstring stretch 3x30s  B Piriformis stretch 3x30s   Bike - 10'  Sit <> stand 3x10   Sciatic nerve glides 3x10    Standing    Hip abduction 3x10  Hip ext 3x10  Sit <> stands  3x10      Aga participated in neuromuscular re-education activities to improve: Sense for 00 minutes. The following activities were included:  Sciatic nerve glides -3'  Standing - hip abduction and extension on pad - 3'/3'       Home Exercises Provided and Patient Education Provided     Education provided:   - Continue with HEP    Written Home Exercises Provided: yes. Exercises were reviewed and Aga was able to demonstrate them prior to the end of the session.  Aga demonstrated fair  understanding of the education provided. See EMR under Patient Instructions for exercises provided during therapy sessions    Assessment     Pt continues to present w/o pain.  Patient completed her therapy as noted above with no increase in symptoms prior to leaving the clinic.  Pt will continue to benefit from skilled therapy.     gAa Is progressing well towards her goals.   Pt prognosis is Fair.     Pt will continue to benefit from skilled outpatient physical therapy to address the deficits listed in the problem list box on initial evaluation, provide pt/family education and to maximize pt's level of independence in the home and community environment.     Pt's spiritual, cultural and educational needs considered and pt agreeable to plan of care and goals.    Anticipated barriers to physical therapy: None Identified at eval    Goals:  Short Term Goals: 4 weeks 12/16/2024 (4)      Goal Status    Pt. to report decreased left hip pain </ = 2/10 at worst to increase tolerance for bowlign progressing    Pt. to demonstrate increased left  hamstring flexibility via -30 degrees displayed during  90/90 hamstring flexibility testst to improve ease with stair descent. progressing   Pt to demonstrate increased MMT for left  hip flexion and extension to 4/5 to decrease gait deviation during ambulation period. progressing    Pt. demonstrate increased MMT for left  hip abduction  and adduction  to 4/5 to increase ease with ascending /  descending stairs.   progressing   Pt to be Independent with HEP to improve ROM and independence with ADL's progressing          Long Term Goals: 8 weeks 1/13/2025 (8)      Goal Status    Pt. to report decreased left hip pain </ = 1/10 at worst to increase tolerance for stair climbing      Pt. to demonstrate increased left hip flexor flexibility via +8 degrees displayed during ely's test to improve ease with partial squatting     Pt. to demonstrate increased left  hamstring flexibility via -25 degrees displayed during  90/90 hamstring flexibility testst to improve ease with stair descent.     Pt to demonstrate increased MMT for left  hip flexion and extension to 4+/5 to decrease gait deviation during ambulation period.     Pt. demonstrate increased MMT for left  hip abduction  and adduction  to 4+/5 to increase ease with ascending / descending stairs.      Pt to be Independent with HEP to improve ROM and independence with ADL's          Plan     Continued with current POC      Chan Driver, PTA   12/27/2024

## 2025-01-03 ENCOUNTER — CLINICAL SUPPORT (OUTPATIENT)
Dept: REHABILITATION | Facility: HOSPITAL | Age: 85
End: 2025-01-03
Payer: MEDICARE

## 2025-01-03 DIAGNOSIS — M79.18 GLUTEAL PAIN: Primary | ICD-10-CM

## 2025-01-03 DIAGNOSIS — M25.552 ACUTE HIP PAIN, LEFT: ICD-10-CM

## 2025-01-03 PROCEDURE — 97110 THERAPEUTIC EXERCISES: CPT | Mod: PN,CQ

## 2025-01-03 NOTE — PROGRESS NOTES
[facility]   Physical Therapy Treatment Note        Name: Aga Aguilar [facility] Number: [id number]    Therapy Diagnosis:   Encounter Diagnoses   Name Primary?    Gluteal pain Yes    Acute hip pain, left      Physician: Daniela Chowdary DO    Visit Date: 1/3/2025  Last Scheduled Visit:     Physician Orders: PT Eval and Treat   Medical Diagnosis from Referral:   M76.02 (ICD-10-CM) - Gluteal tendinitis of left buttock   M70.62 (ICD-10-CM) - Greater trochanteric bursitis of left hip      Evaluation Date: 11/18/2024  Authorization Period Expiration: 12/31/24  Plan of Care Expiration: 2/10/25 or 24 Visit  Progress Note Due: 1/18/2025  Visit # / Visits authorized: 6/ 20 (7 previous visits)  Visits Remaining - 2  PTA visit #: 1/6    Time In: 9:05  Time Out: 10:00  Billable Time: 55 minutes     Insurance: Payor: MEDICARE / Plan: MEDICARE PART A & B / Product Type: Government /     Subjective     Pt reports: having no pain today, would like D/C  She was compliant with home exercise program.  Response to previous treatment: none stated  Functional change: Nones stated     Pain: 0/10  Location: left hip      Objective     Objective Measures Performed  - 12/18/24         Treatment      Aga received the treatments listed below (Bold exercise performed during 1/3/2025 visit):      therapeutic exercises to develop strength, endurance, ROM, and flexibility for 55 minutes including:    Warm-up      Supine    Hip add 3x10  Hip abd BTB 3x10  Straight Leg Raise 3x10 #2  Glue sets 3x10  Marches w/ GTB 3x10  Bridges 3x10  Reverse Abs 3x10 #2    Seated    Piriformis stretch with trunk flexion - 2'  Piriformis stretch with knee depression  - 2'  Hermelindo stretch with and without strap - 3'  Long sitting hamsting stretch - 2  Iliotibial band stretch   B Hamstring stretch 3x30s  B Piriformis stretch 3x30s   Bike - 10'  Sciatic nerve glides 3x10    Standing    Hip abduction 3x10  Hip ext 3x10  Sit <>  stands 3x10  Heel raises 3x10      gAa participated in neuromuscular re-education activities to improve: Sense for 00 minutes. The following activities were included:  Sciatic nerve glides -3'  Standing - hip abduction and extension on pad - 3'/3'       Home Exercises Provided and Patient Education Provided     Education provided:   - Continue with HEP    Written Home Exercises Provided: yes. Exercises were reviewed and Aga was able to demonstrate them prior to the end of the session.  Aga demonstrated fair  understanding of the education provided. See EMR under Patient Instructions for exercises provided during therapy sessions    Assessment     Pt tolerated session well. She requested a formal D/C from therapy due to her progress and lack of pain. See attached PT note for D/C.    Aga Is progressing well towards her goals.   Pt prognosis is Fair.     Pt will continue to benefit from skilled outpatient physical therapy to address the deficits listed in the problem list box on initial evaluation, provide pt/family education and to maximize pt's level of independence in the home and community environment.     Pt's spiritual, cultural and educational needs considered and pt agreeable to plan of care and goals.    Anticipated barriers to physical therapy: None Identified at eval    Goals:  Short Term Goals: 4 weeks 12/16/2024 (4)      Goal Status    Pt. to report decreased left hip pain </ = 2/10 at worst to increase tolerance for bowlign progressing    Pt. to demonstrate increased left  hamstring flexibility via -30 degrees displayed during  90/90 hamstring flexibility testst to improve ease with stair descent. progressing   Pt to demonstrate increased MMT for left  hip flexion and extension to 4/5 to decrease gait deviation during ambulation period. progressing    Pt. demonstrate increased MMT for left  hip abduction  and adduction  to 4/5 to increase ease with ascending / descending stairs.    progressing   Pt to be Independent with HEP to improve ROM and independence with ADL's progressing          Long Term Goals: 8 weeks 1/13/2025 (8)      Goal Status    Pt. to report decreased left hip pain </ = 1/10 at worst to increase tolerance for stair climbing      Pt. to demonstrate increased left hip flexor flexibility via +8 degrees displayed during ely's test to improve ease with partial squatting     Pt. to demonstrate increased left  hamstring flexibility via -25 degrees displayed during  90/90 hamstring flexibility testst to improve ease with stair descent.     Pt to demonstrate increased MMT for left  hip flexion and extension to 4+/5 to decrease gait deviation during ambulation period.     Pt. demonstrate increased MMT for left  hip abduction  and adduction  to 4+/5 to increase ease with ascending / descending stairs.      Pt to be Independent with HEP to improve ROM and independence with ADL's          Plan     Continued with current POC      Emir Morelos, ELISEO   1/3/2025

## 2025-01-03 NOTE — PROGRESS NOTES
Range of Motion/Strength:      Hip Left Right Pain/Dysfunction with Movement   AROM         Hip flexion (120)  120°   120°      Hip extension (20)         Hip abduction (45)  35°   30°      Hip adduction (30)  35°    40°      Hip external rotation (45)  40°   35°      Hip internal rotation (45)  45°   35°      Knee flexion (135)  120°   120°      Knee extension (0)  0°   0°               RLE 5/5 4+/5 4/5 4-/5 3+/5 3/5 3-/5 2+/5 2/5 2-/5 1/5 0 NT   Hip Flexion     x                       Hip Extension     x                       Hip Abduction     x                       Hip Adduction     x                       Hip Internal Rotation     x                       Hip External Rotation     x                       Knee Flexion     x                       Knee Extension     x                       Ankle Dorsiflexion     x                       Ankle Plantarflexion     x                          LLE 5/5 4+/5 4/5 4-/5 3+/5 3/5 3-/5 2+/5 2/5 2-/5 1/5 0 NT   Hip Flexion      x                       Hip Extension      x                       Hip Abduction      x                      Hip Adduction      x                      Hip Internal Rotation      x                      Hip External Rotation      x                      Knee Flexion      x                       Knee Extension      x                       Ankle Dorsiflexion      x                       Ankle Plantarflexion      x                             Special Tests Left Right Comments   MARCUS  Negative negative     FADIR negative negative     Scour negative negative     Piriformis  positive negative     Flexibility          90/90 Hamstrings  -15° -25°        Gait Analysis   Aga Rodriges ambulated 90 feet with none device with independence assistance. Aga Rodriges displays lack of stride  gait deviation during 1 gait cycle.       Other:     Evaluation   Single Limb Stance R LE 20 sec  (<10 sec = HIGH FALL RISK)   Single Limb Stance L LE 23 sec  (<10  sec = HIGH FALL RISK)      Normative Values for Single Leg Stance, Eyes Open   Age: Time:   60-69 27 sec   70-79 17.2 sec   80-89 8.5 sec        Evaluation   30 second Chair Rise  (adults > 59 y/o) 15  completed with arms      Normative Scores for 30 sec Chair Rise (arms across chest; full stand and full sitting)    60-64 65-69 70-74 75-79 80-84 85-89 90-94   Range for Men 14-19 12-18 12-17 11-17 10-15 8-14 7-12   Range for Women 12-17 11-16 10-16 10-15 9-14 8-13 4-11        Evaluation   Timed Up and Go <14 sec      >14 seconds associated with high fall risk  > 30 seconds predictive of requiring ambulation device & being dependent in ADLs     Table: Population Norms for TUG    Age  Average TUG    60 - 69 years  8.1 seconds    70 - 79 years  9.2 seconds    80 - 99 years  11.3 seco     Patient will be discharged from therapy services.

## (undated) DEVICE — ADAPTER DUAL IRRIGATION

## (undated) DEVICE — SEE MEDLINE ITEM 153151

## (undated) DEVICE — TOURNIQUET SB QC DP 34X4IN

## (undated) DEVICE — SOL IRR NACL .9% 3000ML

## (undated) DEVICE — SEE MEDLINE ITEM 157125

## (undated) DEVICE — DRAPE CASSETTE 20 X 40

## (undated) DEVICE — HOOD T-5 TEAR AWAY STERILE

## (undated) DEVICE — CARD UNIV KNEE NAVGTN SW-SCL L

## (undated) DEVICE — GAUZE SPONGE 4X4 12PLY

## (undated) DEVICE — NOZZLE BNE INJ CEM FEM POST 65

## (undated) DEVICE — SYR ONLY LUER LOCK 20CC

## (undated) DEVICE — CONTAINER SPECIMEN STR 3 0Z

## (undated) DEVICE — GLOVE 7.5 PROTEXIS PI ORTHO PF

## (undated) DEVICE — NDL ECLIPSE SAFETY 18GX1-1/2IN

## (undated) DEVICE — SCRUB 10% POVIDONE IODINE 4OZ

## (undated) DEVICE — MANIFOLD 4 PORT

## (undated) DEVICE — ADHESIVE DERMABOND ADVANCED

## (undated) DEVICE — SUT CTD VICRYL 2.0

## (undated) DEVICE — Device

## (undated) DEVICE — DRAPE INCISE IOBAN 2 23X23IN

## (undated) DEVICE — ALCOHOL 70% ISOP W/GREEN 16OZ

## (undated) DEVICE — DURAPREP SURG SCRUB 26ML

## (undated) DEVICE — DRESSING COVER AQUACEL AG SURG

## (undated) DEVICE — DRAPE TIBURON ORTHOPEDIC SPLIT

## (undated) DEVICE — COVER OVERHEAD SURG LT BLUE

## (undated) DEVICE — SUT STRATAFIX PDS 1 CTX 18IN

## (undated) DEVICE — BLADE 90X13X1.27MM

## (undated) DEVICE — BLADE RMR PATELLA 35MM

## (undated) DEVICE — GLOVE 8 PROTEXIS PI BLUE

## (undated) DEVICE — KIT MX BNE CEM REVOLTN W/BRKWY

## (undated) DEVICE — PAD PREP 50/CA

## (undated) DEVICE — BATTERY INSTRUMENT

## (undated) DEVICE — PULSAVAC ZIMMER

## (undated) DEVICE — ELECTRODE REM PLYHSV RETURN 9

## (undated) DEVICE — SYR 10CC LUER LOCK

## (undated) DEVICE — SUT VICRYL 1 OB 36 CTX

## (undated) DEVICE — CLOSURE SKIN STERI STRIP 1/2X4

## (undated) DEVICE — PIN PINABALL HEADLESS
Type: IMPLANTABLE DEVICE | Site: KNEE | Status: NON-FUNCTIONAL
Removed: 2018-10-01